# Patient Record
Sex: MALE | Race: WHITE | NOT HISPANIC OR LATINO | Employment: FULL TIME | URBAN - METROPOLITAN AREA
[De-identification: names, ages, dates, MRNs, and addresses within clinical notes are randomized per-mention and may not be internally consistent; named-entity substitution may affect disease eponyms.]

---

## 2021-05-17 ENCOUNTER — TRANSFERRED RECORDS (OUTPATIENT)
Dept: HEALTH INFORMATION MANAGEMENT | Facility: CLINIC | Age: 41
End: 2021-05-17

## 2021-06-14 ENCOUNTER — TRANSFERRED RECORDS (OUTPATIENT)
Dept: HEALTH INFORMATION MANAGEMENT | Facility: CLINIC | Age: 41
End: 2021-06-14

## 2021-07-26 ENCOUNTER — TRANSFERRED RECORDS (OUTPATIENT)
Dept: HEALTH INFORMATION MANAGEMENT | Facility: CLINIC | Age: 41
End: 2021-07-26

## 2021-07-30 ENCOUNTER — TRANSFERRED RECORDS (OUTPATIENT)
Dept: HEALTH INFORMATION MANAGEMENT | Facility: CLINIC | Age: 41
End: 2021-07-30

## 2021-07-30 ENCOUNTER — MEDICAL CORRESPONDENCE (OUTPATIENT)
Dept: HEALTH INFORMATION MANAGEMENT | Facility: CLINIC | Age: 41
End: 2021-07-30

## 2021-08-02 ENCOUNTER — MEDICAL CORRESPONDENCE (OUTPATIENT)
Dept: HEALTH INFORMATION MANAGEMENT | Facility: CLINIC | Age: 41
End: 2021-08-02

## 2021-08-03 ENCOUNTER — TRANSCRIBE ORDERS (OUTPATIENT)
Dept: OTHER | Age: 41
End: 2021-08-03

## 2021-08-03 DIAGNOSIS — K70.31 ALCOHOLIC CIRRHOSIS OF LIVER WITH ASCITES (H): Primary | ICD-10-CM

## 2021-08-12 NOTE — TELEPHONE ENCOUNTER
RECORDS RECEIVED FROM: External   Appt Date: 08.18.2021   NOTES STATUS DETAILS   OFFICE NOTE from referring provider Received 08.03.2021 Christina Fabian MD     OFFICE NOTES from other specialists Care Everywhere 05.24.2021 Perez Salcido MD      02.17.2021 Ashley Silva, APRN, CNP     DISCHARGE SUMMARY from hospital Care Everywhere 05.06.2021, 10.24.2020 [facility]   MEDICATION LIST Care Everywhere    LIVER BIOSPY (IF APPLICABLE)      PATHOLOGY REPORTS  N/A    IMAGING     ENDOSCOPY (IF AVAILABLE) N/A    COLONOSCOPY (IF AVAILABLE) N/A    ULTRASOUND LIVER Care Everywhere 08.09.2021, 08.03.2021, 10.5.2020 Ultrasound-guided paracentesis     CT OF ABDOMEN N/A    MRI OF LIVER N/A    FIBROSCAN, US ELASTOGRAPHY, FIBROSIS SCAN, MR ELASTOGRAPHY N/A    LABS     HEPATIC PANEL (LIVER PANEL) Care Everywhere 05.06.2021   BASIC METABOLIC PANEL Care Everywhere 05.24.2021   COMPLETE METABOLIC PANEL Care Everywhere 07.27.2021   COMPLETE BLOOD COUNT (CBC) Care Everywhere 07.27.2021   INTERNATIONAL NORMALIZED RATIO (INR) Care Everywhere 07.27.2021   HEPATITIS C ANTIBODY Care Everywhere 10.24.2020   HEPATITIS C VIRAL LOAD/PCR N/A    HEPATITIS C GENOTYPE N/A    HEPATITIS B SURFACE ANTIGEN Care Everywhere 10.24.2020   HEPATITIS B SURFACE ANTIBODY Care Everywhere 07.27.2021   HEPATITIS B DNA QUANT LEVEL N/A    HEPATITIS B CORE ANTIBODY Care Everywhere 07.27.2021     Action 08.12.2021 RM   Action Taken Sent fax request for images to [facility], pending.     Action 08.16.2021 RM   Action Taken Images received and uploaded to chart.

## 2021-08-18 ENCOUNTER — LAB (OUTPATIENT)
Dept: LAB | Facility: CLINIC | Age: 41
End: 2021-08-18
Attending: STUDENT IN AN ORGANIZED HEALTH CARE EDUCATION/TRAINING PROGRAM
Payer: COMMERCIAL

## 2021-08-18 ENCOUNTER — PRE VISIT (OUTPATIENT)
Dept: GASTROENTEROLOGY | Facility: CLINIC | Age: 41
End: 2021-08-18

## 2021-08-18 ENCOUNTER — TELEPHONE (OUTPATIENT)
Dept: GASTROENTEROLOGY | Facility: CLINIC | Age: 41
End: 2021-08-18

## 2021-08-18 ENCOUNTER — OFFICE VISIT (OUTPATIENT)
Dept: GASTROENTEROLOGY | Facility: CLINIC | Age: 41
End: 2021-08-18
Attending: INTERNAL MEDICINE
Payer: COMMERCIAL

## 2021-08-18 VITALS
SYSTOLIC BLOOD PRESSURE: 123 MMHG | WEIGHT: 248.6 LBS | DIASTOLIC BLOOD PRESSURE: 72 MMHG | OXYGEN SATURATION: 100 % | HEART RATE: 109 BPM

## 2021-08-18 DIAGNOSIS — F10.21 ALCOHOL USE DISORDER, SEVERE, IN EARLY REMISSION (H): ICD-10-CM

## 2021-08-18 DIAGNOSIS — K70.31 ALCOHOLIC CIRRHOSIS OF LIVER WITH ASCITES (H): Primary | ICD-10-CM

## 2021-08-18 DIAGNOSIS — E87.1 HYPONATREMIA: ICD-10-CM

## 2021-08-18 DIAGNOSIS — K70.31 ALCOHOLIC CIRRHOSIS OF LIVER WITH ASCITES (H): ICD-10-CM

## 2021-08-18 PROBLEM — J45.20 MILD INTERMITTENT ASTHMA WITHOUT COMPLICATION: Status: ACTIVE | Noted: 2018-01-08

## 2021-08-18 PROBLEM — E11.9 TYPE 2 DIABETES MELLITUS WITHOUT COMPLICATION, WITHOUT LONG-TERM CURRENT USE OF INSULIN (H): Status: ACTIVE | Noted: 2018-01-08

## 2021-08-18 PROBLEM — E66.9 OBESITY (BMI 30-39.9): Status: ACTIVE | Noted: 2018-01-08

## 2021-08-18 PROBLEM — N52.2 DRUG-INDUCED ERECTILE DYSFUNCTION: Status: ACTIVE | Noted: 2018-01-08

## 2021-08-18 LAB
AFP SERPL-MCNC: 3.2 UG/L (ref 0–8)
ALBUMIN SERPL-MCNC: 3.2 G/DL (ref 3.4–5)
ALP SERPL-CCNC: 101 U/L (ref 40–150)
ALT SERPL W P-5'-P-CCNC: 54 U/L (ref 0–70)
ANION GAP SERPL CALCULATED.3IONS-SCNC: 9 MMOL/L (ref 3–14)
AST SERPL W P-5'-P-CCNC: 50 U/L (ref 0–45)
BILIRUB SERPL-MCNC: 1.2 MG/DL (ref 0.2–1.3)
BUN SERPL-MCNC: 37 MG/DL (ref 7–30)
CALCIUM SERPL-MCNC: 9.3 MG/DL (ref 8.5–10.1)
CHLORIDE BLD-SCNC: 95 MMOL/L (ref 94–109)
CO2 SERPL-SCNC: 21 MMOL/L (ref 20–32)
CREAT SERPL-MCNC: 1.17 MG/DL (ref 0.66–1.25)
ERYTHROCYTE [DISTWIDTH] IN BLOOD BY AUTOMATED COUNT: 14.2 % (ref 10–15)
FERRITIN SERPL-MCNC: 64 NG/ML (ref 26–388)
GFR SERPL CREATININE-BSD FRML MDRD: 76 ML/MIN/1.73M2
GLUCOSE BLD-MCNC: 129 MG/DL (ref 70–99)
HCT VFR BLD AUTO: 29 % (ref 40–53)
HGB BLD-MCNC: 9.7 G/DL (ref 13.3–17.7)
INR PPP: 1.21 (ref 0.85–1.15)
MCH RBC QN AUTO: 29.8 PG (ref 26.5–33)
MCHC RBC AUTO-ENTMCNC: 33.4 G/DL (ref 31.5–36.5)
MCV RBC AUTO: 89 FL (ref 78–100)
PLATELET # BLD AUTO: 192 10E3/UL (ref 150–450)
POTASSIUM BLD-SCNC: 5.3 MMOL/L (ref 3.4–5.3)
PROT SERPL-MCNC: 6.5 G/DL (ref 6.8–8.8)
RBC # BLD AUTO: 3.26 10E6/UL (ref 4.4–5.9)
SODIUM SERPL-SCNC: 125 MMOL/L (ref 133–144)
WBC # BLD AUTO: 8.5 10E3/UL (ref 4–11)

## 2021-08-18 PROCEDURE — 80053 COMPREHEN METABOLIC PANEL: CPT | Performed by: PATHOLOGY

## 2021-08-18 PROCEDURE — 85027 COMPLETE CBC AUTOMATED: CPT | Performed by: PATHOLOGY

## 2021-08-18 PROCEDURE — 36415 COLL VENOUS BLD VENIPUNCTURE: CPT | Performed by: PATHOLOGY

## 2021-08-18 PROCEDURE — 85610 PROTHROMBIN TIME: CPT | Performed by: PATHOLOGY

## 2021-08-18 PROCEDURE — 80321 ALCOHOLS BIOMARKERS 1OR 2: CPT | Mod: 90 | Performed by: PATHOLOGY

## 2021-08-18 PROCEDURE — 82105 ALPHA-FETOPROTEIN SERUM: CPT | Mod: 90 | Performed by: PATHOLOGY

## 2021-08-18 PROCEDURE — 82728 ASSAY OF FERRITIN: CPT | Performed by: PATHOLOGY

## 2021-08-18 PROCEDURE — 99417 PROLNG OP E/M EACH 15 MIN: CPT | Performed by: STUDENT IN AN ORGANIZED HEALTH CARE EDUCATION/TRAINING PROGRAM

## 2021-08-18 PROCEDURE — 99205 OFFICE O/P NEW HI 60 MIN: CPT | Performed by: STUDENT IN AN ORGANIZED HEALTH CARE EDUCATION/TRAINING PROGRAM

## 2021-08-18 RX ORDER — FUROSEMIDE 40 MG
40 TABLET ORAL DAILY
COMMUNITY
Start: 2020-10-27 | End: 2021-10-18

## 2021-08-18 RX ORDER — FERROUS SULFATE 325(65) MG
325 TABLET ORAL
COMMUNITY
End: 2023-08-30

## 2021-08-18 RX ORDER — MULTIVITAMIN WITH IRON
1 TABLET ORAL DAILY
COMMUNITY
End: 2022-09-13

## 2021-08-18 RX ORDER — CHLORAL HYDRATE 500 MG
2 CAPSULE ORAL DAILY
COMMUNITY
End: 2022-09-13

## 2021-08-18 NOTE — LETTER
8/18/2021         RE: Florian Nowak  [address] [address]        Dear Colleague,    Thank you for referring your patient, Florian oNwak, to the [facility] [place]. Please see a copy of my visit note below.    [facility] New Patient Visit    Date of Visit: August 18, 2021    Reason for referral: Alcohol related liver disease    Subjective: Mr. Nowak is a 41 year old man with a history of AUD, alcohol related cirrhosis, who presents for evaluation of refractory ascites.     He was told he had KANG in his 30s while living in [place]. Was around 300 lbs then. At that time was drinking 5 drinks 5 days a week. Ferritin was elevated, did not get HC testing.     He reports he would drink 5 drinks 5 days a week roughly. He would have some periods of sobriety for a few months when trying to quit. Spring 2020 cut back to 2 glasses of wine a day. Was not feeling well, stopping drinking in October and then suddenly got bloated. Presented to urgent care 10/2020 found to have new ascites. BR was 5 at that time.     First few months could go 2-4 weeks between paracentesis, had to increase frequency. In May K noted to be high with DELLA (creatinine 3.4) - was admitted for this. Was taking hydrochlorothiazide, lisinopril and BB at that time. After this was getting twice a week paracentesis, sometimes up to 10 L. He does not eat processed foods, has cut back on his salt and now getting rob every 5-6 days, 6-10 liters with albumin replacement. Sent for cell count every time, no history of SBP. Not restricting fluid specifically because gets light headed with this, does not think he is drinking too much. Taking lasix 80 mg daily, dose increased a few weeks ago.     He has never done alcohol counseling. No history of DUIs or trouble at work but there was an instance where someone thought he smelled like alcohol, had to get tested and that was  negative. He is committed to never drinking again, does not have cravings to drink.     Painful leg tremors/shaking/cramping at night - causing sleep deprivation. Started magnesium and iron for this. Having issues with insomnia.     No history of HE, variceal bleeding. Had an EGD 6/2021 that showed grade 2 EVs with red whales, banded. Also severe PHG.     Had negative hepatitis B and C testing in the past. Getting vaccinated against hepatitis A/B.     ROS: 14 point ROS negative except for positives noted in HPI.    PMHx:  - AUD  - Alcohol related cirrhosis c/b refractory ascites  - Asthma  - HTN    PSHx:  - Back surgery microdisectomy    FamHx:  No family history of liver disease, liver cancer    SocHx:  Social History     Socioeconomic History     Marital status:      Spouse name: Not on file     Number of children: Not on file     Years of education: Not on file     Highest education level: Not on file   Occupational History     Not on file   Tobacco Use     Smoking status: Former Smoker     Smokeless tobacco: Never Used     Tobacco comment: College   Substance and Sexual Activity     Alcohol use: Not Currently     Drug use: Not Currently     Sexual activity: Not on file   Other Topics Concern     Not on file   Social History Narrative     Not on file     Social Determinants of Health     Financial Resource Strain:      Difficulty of Paying Living Expenses:    Food Insecurity:      Worried About Running Out of Food in the Last Year:      Ran Out of Food in the Last Year:    Transportation Needs:      Lack of Transportation (Medical):      Lack of Transportation (Non-Medical):    Physical Activity:      Days of Exercise per Week:      Minutes of Exercise per Session:    Stress:      Feeling of Stress :    Social Connections:      Frequency of Communication with Friends and Family:      Frequency of Social Gatherings with Friends and Family:      Attends Judaism Services:      Active Member of Clubs or  Organizations:      Attends Club or Organization Meetings:      Marital Status:    Intimate Partner Violence:      Fear of Current or Ex-Partner:      Emotionally Abused:      Physically Abused:      Sexually Abused:    Lives with 5 year old daughter  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   No NSAIDs    Allergies:  Allergies   Allergen Reactions     Ketorolac Tromethamine Other (See Comments)       Objective:  /72   Pulse 109   Wt 112.8 kg (248 lb 9.6 oz)   SpO2 100%   Constitutional: pleasant man in NAD  Eyes: non icteric  Respiratory: Normal respiratory excursion   MSK: normal range of motion of visualized extremities  Abd: Non distended  Skin: No jaundice  Psychiatric: normal mood and orientation    Labs:  Last Comprehensive Metabolic Panel:  Sodium   Date Value Ref Range Status   08/18/2021 125 (L) 133 - 144 mmol/L Final     Potassium   Date Value Ref Range Status   08/18/2021 5.3 3.4 - 5.3 mmol/L Final     Chloride   Date Value Ref Range Status   08/18/2021 95 94 - 109 mmol/L Final     Carbon Dioxide (CO2)   Date Value Ref Range Status   08/18/2021 21 20 - 32 mmol/L Final     Anion Gap   Date Value Ref Range Status   08/18/2021 9 3 - 14 mmol/L Final     Glucose   Date Value Ref Range Status   08/18/2021 129 (H) 70 - 99 mg/dL Final     Urea Nitrogen   Date Value Ref Range Status   08/18/2021 37 (H) 7 - 30 mg/dL Final     Creatinine   Date Value Ref Range Status   08/18/2021 1.17 0.66 - 1.25 mg/dL Final     GFR Estimate   Date Value Ref Range Status   08/18/2021 76 >60 mL/min/1.73m2 Final     Comment:     As of July 11, 2021,  eGFR is calculated by the CKD-EPI creatinine equation, without race adjustment. eGFR can be influenced by muscle mass, exercise, and diet. The reported eGFR is an estimation only and is only applicable if the renal function is stable.     Calcium   Date Value Ref Range Status   08/18/2021 9.3 8.5 - 10.1 mg/dL Final     Bilirubin Total   Date Value Ref Range Status   08/18/2021 1.2 0.2 - 1.3 mg/dL Final     Alkaline Phosphatase   Date Value Ref Range Status   08/18/2021 101 40 - 150 U/L Final     ALT   Date Value Ref Range Status   08/18/2021 54 0 - 70 U/L Final     AST   Date Value Ref Range Status   08/18/2021 50 (H) 0 - 45 U/L Final       Lab Results   Component Value Date    WBC 8.5 08/18/2021     Lab Results   Component Value Date    RBC 3.26 08/18/2021     Lab Results   Component Value Date    HGB 9.7 08/18/2021     Lab Results   Component Value Date    HCT 29.0 08/18/2021     Lab Results   Component Value Date    MCV 89 08/18/2021     Lab Results   Component Value Date    MCH 29.8 08/18/2021     Lab Results   Component Value Date    MCHC 33.4 08/18/2021     Lab Results   Component Value Date    RDW 14.2 08/18/2021     Lab Results   Component Value Date     08/18/2021       INR   Date Value Ref Range Status   08/18/2021 1.21 (H) 0.85 - 1.15 Final     Comment:     Effective 7/11/2021, the reference range for this assay has changed.        MELD-Na score: 11 at 8/18/2021  9:18 AM  MELD score: 11 at 8/18/2021  9:18 AM  Calculated from:  Serum Creatinine: 1.17 mg/dL at 8/18/2021  9:18 AM  Serum Sodium: 125 mmol/L at 8/18/2021  9:18 AM  Total Bilirubin: 1.2 mg/dL at 8/18/2021  9:18 AM  INR(ratio): 1.21 at 8/18/2021  9:17 AM  Age: 41 years     8/12  Na 122  K 5.5 Creatinine 1.23  INR 1.3  BR 1.3    Creatinine 1-1.2    MELD Na 23  MELD 12    Imaging:    RUQ US 5/17/2021    Aorta and IVC: See comments..     Pancreas: Partially obscured but visualized portions are unremarkable.     Liver: Nodular contour.   Increased echogenicity.     Liver Elastography: Could not be performed due to ascites     CBD: Not visualized     Gallbladder: Nondistended. Mild gallbladder wall thickening to 0.4 cm likely due to chronic liver disease. 3 mm polyp or mural adherent sludge ball.     Sonographic [place]'s Sign: No.     Right Kidney: Measures 12.3 x 5.1 x 5.9 cm. Appears unremarkable.     Left Kidney: Measures  12.3 x 5.6 x 4.7 cm. Appears unremarkable.     Spleen: 14 cm in length.     Ascites: Yes.     IMPRESSION:       1. Cirrhotic-appearing liver with ascites and mild splenomegaly. No visualized focal liver abnormality.     2. Liver elastography could not be performed due to ascites.     3. 3 mm gallbladder polyp or mural adherent sludge ball, this requires no specific follow-up due to its small size.    Endoscopy:    EGD 6/14 Grade 2 EVS with red whale s/p banding x3, severe PHG    Independently reviewed labs and imaging.      Assessment/Plan: Mr. Nowak is a 41 year old man with a history of AUD, alcohol related cirrhosis, who presents for evaluation of refractory ascites.     His liver disease has been complicated by ascites, intolerant to diuretics, requiring serial paracentesis. He does not have a history of HE, SBP, Variceal bleeding.     Discussed the natural history of alcohol related liver disease, he continues to have issues with ascites > 6 months sober. Recommend referral for TIPS pending cardiac evaluation. Also discussed the role of liver transplant, think he would be a good TIPS candidate, discussed the general criteria for that which would include alcohol assessment and counseling.     Labs stable today with baseline hyponatremia.     - Check  ferritin given history. He may need HC testing  - Continue to completely abstain from alcohol including NA beers. Discussed regular Peth testing  - CD referral - discussed this important for sobriety and liver transplant if he were to worsen in the future  - Ordered TTE and  will refer for TIPS  - Continue paracentesis weekly PRN 6 L max with 12.5 grams/L albumin replacement  - Continue current dose of diuretics, would not increase further  - Continue low sodium diet and recommend 2 L fluid restriction with his sodium  - Discussed abx for primary ppx of SBP - he has not tolerated cipro in the past, and would not recommend bactrim given his hyponatremia and hyperkalemia. Will hold on this time, he is getting his cell count done with every paracentesis  Orders Placed This Encounter   Procedures     Comprehensive metabolic panel (BMP + Alb, Alk Phos, ALT, AST, Total. Bili, TP)     CBC with platelets     INR     AFP tumor marker     Phosphatidylethanol (PEth)     Ferritin     IR Referral     MENTAL HEALTH REFERRAL  - Adult; Assessments and Testing; MH/CD Assessment Center - Assess & Treat; Chemical Health Evaluation - determine appropriate level of care and admit to program; [facility]: [facility] [phone]; We will contact you to ...     Echocardiogram Complete     RTC 6 weeks    Cadence Murray MD MS  Hepatology/Liver Transplant  [facility]    Approximately 65 minutes was spent for the visit with 40 minutes of non face-to-face time were spent in review of the patient's medical record on the day of the visit. This included review of previous: clinic visits, hospital records, lab results, imaging studies, and documentation.  The findings from this review are summarized in the above note.              Again, thank you for allowing me to participate in the care of your patient.        Sincerely,        Cadence Murray MD

## 2021-08-18 NOTE — PROGRESS NOTES
[facility] New Patient Visit    Date of Visit: August 18, 2021    Reason for referral: Alcohol related liver disease    Subjective: Mr. Nowak is a 41 year old man with a history of AUD, alcohol related cirrhosis, who presents for evaluation of refractory ascites.     He was told he had KANG in his 30s while living in [place]. Was around 300 lbs then. At that time was drinking 5 drinks 5 days a week. Ferritin was elevated, did not get HC testing.     He reports he would drink 5 drinks 5 days a week roughly. He would have some periods of sobriety for a few months when trying to quit. Spring 2020 cut back to 2 glasses of wine a day. Was not feeling well, stopping drinking in October and then suddenly got bloated. Presented to urgent care 10/2020 found to have new ascites. BR was 5 at that time.     First few months could go 2-4 weeks between paracentesis, had to increase frequency. In May K noted to be high with DELLA (creatinine 3.4) - was admitted for this. Was taking hydrochlorothiazide, lisinopril and BB at that time. After this was getting twice a week paracentesis, sometimes up to 10 L. He does not eat processed foods, has cut back on his salt and now getting rob every 5-6 days, 6-10 liters with albumin replacement. Sent for cell count every time, no history of SBP. Not restricting fluid specifically because gets light headed with this, does not think he is drinking too much. Taking lasix 80 mg daily, dose increased a few weeks ago.     He has never done alcohol counseling. No history of DUIs or trouble at work but there was an instance where someone thought he smelled like alcohol, had to get tested and that was negative. He is committed to never drinking again, does not have cravings to drink.     Painful leg tremors/shaking/cramping at night - causing sleep deprivation. Started magnesium and iron for this. Having issues with insomnia.     No history of HE, variceal bleeding. Had an  EGD 6/2021 that showed grade 2 EVs with red whales, banded. Also severe PHG.     Had negative hepatitis B and C testing in the past. Getting vaccinated against hepatitis A/B.     ROS: 14 point ROS negative except for positives noted in HPI.    PMHx:  - AUD  - Alcohol related cirrhosis c/b refractory ascites  - Asthma  - HTN    PSHx:  - Back surgery microdisectomy    FamHx:  No family history of liver disease, liver cancer    SocHx:  Social History     Socioeconomic History     Marital status:      Spouse name: Not on file     Number of children: Not on file     Years of education: Not on file     Highest education level: Not on file   Occupational History     Not on file   Tobacco Use     Smoking status: Former Smoker     Smokeless tobacco: Never Used     Tobacco comment: College   Substance and Sexual Activity     Alcohol use: Not Currently     Drug use: Not Currently     Sexual activity: Not on file   Other Topics Concern     Not on file   Social History Narrative     Not on file     Social Determinants of Health     Financial Resource Strain:      Difficulty of Paying Living Expenses:    Food Insecurity:      Worried About Running Out of Food in the Last Year:      Ran Out of Food in the Last Year:    Transportation Needs:      Lack of Transportation (Medical):      Lack of Transportation (Non-Medical):    Physical Activity:      Days of Exercise per Week:      Minutes of Exercise per Session:    Stress:      Feeling of Stress :    Social Connections:      Frequency of Communication with Friends and Family:      Frequency of Social Gatherings with Friends and Family:      Attends Temple Services:      Active Member of Clubs or Organizations:      Attends Club or Organization Meetings:      Marital Status:    Intimate Partner Violence:      Fear of Current or Ex-Partner:      Emotionally Abused:      Physically Abused:      Sexually Abused:    Lives with 5 year old daughter  Works with patients with  schizophrenia has a masters in psychology    Medications:  Current Outpatient Medications   Medication     ferrous sulfate (FEROSUL) 325 (65 Fe) MG tablet     fish oil-omega-3 fatty acids 1000 MG capsule     furosemide (LASIX) 40 MG tablet     magnesium 250 MG tablet     vitamin D3 (CHOLECALCIFEROL) 250 mcg (67292 units) capsule     No current facility-administered medications for this visit.   No NSAIDs    Allergies:  Allergies   Allergen Reactions     Ketorolac Tromethamine Other (See Comments)       Objective:  /72   Pulse 109   Wt 112.8 kg (248 lb 9.6 oz)   SpO2 100%   Constitutional: pleasant man in NAD  Eyes: non icteric  Respiratory: Normal respiratory excursion   MSK: normal range of motion of visualized extremities  Abd: Non distended  Skin: No jaundice  Psychiatric: normal mood and orientation    Labs:  Last Comprehensive Metabolic Panel:  Sodium   Date Value Ref Range Status   08/18/2021 125 (L) 133 - 144 mmol/L Final     Potassium   Date Value Ref Range Status   08/18/2021 5.3 3.4 - 5.3 mmol/L Final     Chloride   Date Value Ref Range Status   08/18/2021 95 94 - 109 mmol/L Final     Carbon Dioxide (CO2)   Date Value Ref Range Status   08/18/2021 21 20 - 32 mmol/L Final     Anion Gap   Date Value Ref Range Status   08/18/2021 9 3 - 14 mmol/L Final     Glucose   Date Value Ref Range Status   08/18/2021 129 (H) 70 - 99 mg/dL Final     Urea Nitrogen   Date Value Ref Range Status   08/18/2021 37 (H) 7 - 30 mg/dL Final     Creatinine   Date Value Ref Range Status   08/18/2021 1.17 0.66 - 1.25 mg/dL Final     GFR Estimate   Date Value Ref Range Status   08/18/2021 76 >60 mL/min/1.73m2 Final     Comment:     As of July 11, 2021, eGFR is calculated by the CKD-EPI creatinine equation, without race adjustment. eGFR can be influenced by muscle mass, exercise, and diet. The reported eGFR is an estimation only and is only applicable if the renal function is stable.     Calcium   Date Value Ref Range Status    08/18/2021 9.3 8.5 - 10.1 mg/dL Final     Bilirubin Total   Date Value Ref Range Status   08/18/2021 1.2 0.2 - 1.3 mg/dL Final     Alkaline Phosphatase   Date Value Ref Range Status   08/18/2021 101 40 - 150 U/L Final     ALT   Date Value Ref Range Status   08/18/2021 54 0 - 70 U/L Final     AST   Date Value Ref Range Status   08/18/2021 50 (H) 0 - 45 U/L Final       Lab Results   Component Value Date    WBC 8.5 08/18/2021     Lab Results   Component Value Date    RBC 3.26 08/18/2021     Lab Results   Component Value Date    HGB 9.7 08/18/2021     Lab Results   Component Value Date    HCT 29.0 08/18/2021     Lab Results   Component Value Date    MCV 89 08/18/2021     Lab Results   Component Value Date    MCH 29.8 08/18/2021     Lab Results   Component Value Date    MCHC 33.4 08/18/2021     Lab Results   Component Value Date    RDW 14.2 08/18/2021     Lab Results   Component Value Date     08/18/2021       INR   Date Value Ref Range Status   08/18/2021 1.21 (H) 0.85 - 1.15 Final     Comment:     Effective 7/11/2021, the reference range for this assay has changed.        MELD-Na score: 11 at 8/18/2021  9:18 AM  MELD score: 11 at 8/18/2021  9:18 AM  Calculated from:  Serum Creatinine: 1.17 mg/dL at 8/18/2021  9:18 AM  Serum Sodium: 125 mmol/L at 8/18/2021  9:18 AM  Total Bilirubin: 1.2 mg/dL at 8/18/2021  9:18 AM  INR(ratio): 1.21 at 8/18/2021  9:17 AM  Age: 41 years     8/12  Na 122  K 5.5 Creatinine 1.23  INR 1.3  BR 1.3    Creatinine 1-1.2    MELD Na 23  MELD 12    Imaging:    RUQ US 5/17/2021    Aorta and IVC: See comments..     Pancreas: Partially obscured but visualized portions are unremarkable.     Liver: Nodular contour.  Increased echogenicity.     Liver Elastography: Could not be performed due to ascites     CBD: Not visualized     Gallbladder: Nondistended. Mild gallbladder wall thickening to 0.4 cm likely due to chronic liver disease. 3 mm polyp or mural adherent sludge ball.     Sonographic  [place]'s Sign: No.     Right Kidney: Measures 12.3 x 5.1 x 5.9 cm. Appears unremarkable.     Left Kidney: Measures  12.3 x 5.6 x 4.7 cm. Appears unremarkable.     Spleen: 14 cm in length.     Ascites: Yes.     IMPRESSION:       1. Cirrhotic-appearing liver with ascites and mild splenomegaly. No visualized focal liver abnormality.     2. Liver elastography could not be performed due to ascites.     3. 3 mm gallbladder polyp or mural adherent sludge ball, this requires no specific follow-up due to its small size.    Endoscopy:    EGD 6/14 Grade 2 EVS with red whale s/p banding x3, severe PHG    Independently reviewed labs and imaging.      Assessment/Plan: Mr. Nowak is a 41 year old man with a history of AUD, alcohol related cirrhosis, who presents for evaluation of refractory ascites.     His liver disease has been complicated by ascites, intolerant to diuretics, requiring serial paracentesis. He does not have a history of HE, SBP, Variceal bleeding.     Discussed the natural history of alcohol related liver disease, he continues to have issues with ascites > 6 months sober. Recommend referral for TIPS pending cardiac evaluation. Also discussed the role of liver transplant, think he would be a good TIPS candidate, discussed the general criteria for that which would include alcohol assessment and counseling.     Labs stable today with baseline hyponatremia.     - Check  ferritin given history. He may need HC testing  - Continue to completely abstain from alcohol including NA beers. Discussed regular Peth testing  - CD referral - discussed this important for sobriety and liver transplant if he were to worsen in the future  - Ordered TTE and will refer for TIPS  - Continue paracentesis weekly PRN 6 L max with 12.5 grams/L albumin replacement  - Continue current dose of diuretics, would not increase further  - Continue low sodium diet and recommend 2 L fluid restriction with his sodium  - Discussed abx for primary ppx  of SBP - he has not tolerated cipro in the past, and would not recommend bactrim given his hyponatremia and hyperkalemia. Will hold on this time, he is getting his cell count done with every paracentesis  Orders Placed This Encounter   Procedures     Comprehensive metabolic panel (BMP + Alb, Alk Phos, ALT, AST, Total. Bili, TP)     CBC with platelets     INR     AFP tumor marker     Phosphatidylethanol (PEth)     Ferritin     IR Referral     MENTAL HEALTH REFERRAL  - Adult; Assessments and Testing; MH/CD Assessment Center - Assess & Treat; Chemical Health Evaluation - determine appropriate level of care and admit to program; [facility]: [facility] [phone]; We will contact you to ...     Echocardiogram Complete     RTC 6 weeks    Cadence Murray MD MS  Hepatology/Liver Transplant  [facility]    Approximately 65 minutes was spent for the visit with 40 minutes of non face-to-face time were spent in review of the patient's medical record on the day of the visit. This included review of previous: clinic visits, hospital records, lab results, imaging studies, and documentation.  The findings from this review are summarized in the above note.

## 2021-08-18 NOTE — TELEPHONE ENCOUNTER
----- Message from Cadence Murray MD sent at 8/18/2021  1:48 PM CDT -----  Hey - can you send an order for weekly rob 6 L max with 25 grams albumin replacement for < 3 L removed, and 50 grams for < 6 L removed [facility]? They should send for cell count/dif. HE has an order there but want to update the limit    Thanks!

## 2021-08-18 NOTE — NURSING NOTE
Chief Complaint   Patient presents with     Consult     New pt consult       Blood pressure 123/72, pulse 109, weight 112.8 kg (248 lb 9.6 oz), SpO2 100 %.    Desiree Laboy on 8/18/2021 at 8:04 AM

## 2021-08-19 ENCOUNTER — TRANSFERRED RECORDS (OUTPATIENT)
Dept: HEALTH INFORMATION MANAGEMENT | Facility: CLINIC | Age: 41
End: 2021-08-19

## 2021-08-19 ENCOUNTER — TELEPHONE (OUTPATIENT)
Dept: GASTROENTEROLOGY | Facility: CLINIC | Age: 41
End: 2021-08-19

## 2021-08-19 NOTE — TELEPHONE ENCOUNTER
New para order faxed to [facility] as requested by Dr. Murray. Asked that [facility] schedule reach out to pt to schedule para.    Danielle SOLIS LPN  [facility]atology Clinic

## 2021-08-19 NOTE — TELEPHONE ENCOUNTER
Call back to Gerda Strauss,NP was not able to speak with Gerda because was in doing a procedure. Spoke with MICHELE Sequeira they just wanted to clarify that they are to use Dr. Murray's albumin order instead of the old order. Clarified they should go by Dr. Murray's order going forward.    Danielle SOLIS LPN  [facility]ology Clinic

## 2021-08-19 NOTE — TELEPHONE ENCOUNTER
[facility] Call Center    Phone Message    May a detailed message be left on voicemail: yes     Reason for Call: Other: Please call Gerdarosa Strauss [phone] to clarifyUS Paracentesis  Orders.     Action Taken: Message routed to:  [facility]    Travel Screening: Not Applicable

## 2021-08-23 ENCOUNTER — TRANSFERRED RECORDS (OUTPATIENT)
Dept: HEALTH INFORMATION MANAGEMENT | Facility: CLINIC | Age: 41
End: 2021-08-23

## 2021-08-23 LAB — PETH BLD-MCNC: NEGATIVE NG/ML

## 2021-08-24 ENCOUNTER — TELEPHONE (OUTPATIENT)
Dept: RADIOLOGY | Facility: CLINIC | Age: 41
End: 2021-08-24

## 2021-08-24 NOTE — TELEPHONE ENCOUNTER
----- Message from Alee Tomas RN sent at 8/18/2021  1:59 PM CDT -----  Regarding: NEW tips consult , Dr Murray referring  Please reach out and schedule pt for CT abd pelv first and then in person visit with Dr Parry,     Due anytime now,    Thanks,    TERRY Tomas, RN, BSN  Interventional Radiology Nurse Coordinator   Phone:  [phone]

## 2021-09-01 ENCOUNTER — TELEPHONE (OUTPATIENT)
Dept: GASTROENTEROLOGY | Facility: CLINIC | Age: 41
End: 2021-09-01

## 2021-09-01 ENCOUNTER — REFERRAL (OUTPATIENT)
Dept: TRANSPLANT | Facility: CLINIC | Age: 41
End: 2021-09-01

## 2021-09-01 DIAGNOSIS — K70.31 ALCOHOLIC CIRRHOSIS OF LIVER WITH ASCITES (H): Primary | ICD-10-CM

## 2021-09-01 DIAGNOSIS — K70.31 ALCOHOLIC CIRRHOSIS OF LIVER WITH ASCITES (H): ICD-10-CM

## 2021-09-01 DIAGNOSIS — K70.30 ALCOHOLIC CIRRHOSIS (H): Primary | ICD-10-CM

## 2021-09-01 NOTE — LETTER
October 18, 2021    Florian ROMAN Eliu  [address] [address]    Dear Mr. Nowak,   The purpose of this letter is to let you know that based on the results of your consultation and the selection criteria used by our program , the decision was made to not evaluation you for the liver transplant list.  This is because your current MELD score of 11 is too well for liver transplant.  Important things you should know:    If you would like to discuss the decision, or if your medical status changes you may schedule a return visits with your doctor by calling [phone] and asking to speak to your transplant coordinator.    We recommend that you continue to follow up with your primary care doctor in order to manage your health concerns.    Continue to follow with Dr. Murray as if your liver does take a turn she will get you back into transplant evaluation.   Enclosed is a letter from [facility] which describes the services offered to patients by [facility] and the Organ Procurement and Transplantation Network.  Thank you for allowing us to participate in your care.  We wish you well.  Sincerely,    Douglas Manzano Jr., BSN, RN  Liver Transplant Coordinator  [phone]    Enclosures: [facility] Letter  cc: Care Team            The Organ Procurement and Transplantation Network  Toll-free patient services line:     Your resource for organ transplant information    If you have a question regarding your own medical care, you always should call your transplant hospital first. However, for general organ transplant-related information, you can call the Organ Procurement and Transplantation Network (OPTN) toll-free patient services line at [phone]- 1991. Anyone, including potential transplant candidates, candidates, recipients, family members, friends, living donors, and donor family members, can call this number to:          Talk about organ donation, living donation, the transplant process, the donation process, and  transplant policies.    Get a free patient information kit with helpful booklets, waiting list and transplant information, and a list of all transplant hospitals.    Ask questions about the OPTN website (https://optn.transplant.hrsa.gov/), the United Network for Organ Sharing s (UNOS) website (https://unos.org/), or the UNOS website for living donors and transplant recipients. (https://www.transplantliving.org/).    Learn how the OPTN can help you.    Talk about any concerns that you may have with a transplant hospital.    The [facility] transplant system, the OPTN, is managed under federal contract by the United Network for Organ Sharing (UNOS), which is a non-profit charitable organization. The OPTN helps create and define organ sharing policies that make the best use of donated organs. This process continuously evaluating new advances and discoveries so policies can be adapted to best serve patients waiting for transplants. To do so, the OPTN works closely with transplant professionals, transplant patients, transplant candidates, donor families, living donors, and the public. All transplant programs and organ procurement organizations throughout the country are OPTN members and are obligated to follow the policies the OPTN creates for allocating organs.    The OPTN also is responsible for:      Providing educational material for patients, the public, and professionals.    Raising awareness of the need for donated organs and tissue.    Coordinating organ procurement, matching, and placement.    Collecting information about every organ transplant and donation that occurs in the [place].    Remember, you should contact your transplant hospital directly if you have questions or concerns about your own medical care including medical records, work-up progress, and test results.    We are not your transplant hospital, and our staff will not be able to answer questions about your case, so please keep your transplant  hospital s phone number handy.    However, while you research your transplant needs and learn as much as you can about transplantation and donation, we welcome your call to our toll-free patient services line at [phone].          Updated 4/1/2019

## 2021-09-01 NOTE — LETTER
Florian Nowak  [address] [address]          Dear Florian,    Thank you for your interest in the Transplant Center at [facility]. We look forward to being a part of your care team and assisting you through the transplant process.    As we discussed, your transplant coordinator is Douglas Manzano Jr. (Liver).  You may call your coordinator at any time with questions or concerns call [phone].    Please complete the following.    1. Fill out and return the enclosed forms    Authorization for Electronic Communication    Authorization to Discuss Protected Health Information    Authorization for Release of Protected Health Information    Authorization for Care Everywhere Release of Information    2. Sign up for:    [employer], access to your electronic medical record (see enclosed pamphlet)    [employer], a transplant education website    You can use these tools to learn more about your transplant, communicate with your care team, and track your medical details      Sincerely,  [facility] Transplant  [facility]    cc: Referring Physician and PCP

## 2021-09-01 NOTE — TELEPHONE ENCOUNTER
----- Message from Cadence Murray MD sent at 8/31/2021  9:24 PM CDT -----  Regarding: Para order  Hey -    Can you place an order for weekly rob for this patient to be sent to [facility]? He got a para today, so hoping to schedule the first next week (do you call to help that or just give him the number to call for [facility]?)    Para order: Continue paracentesis weekly PRN 6 L max with 12.5 grams/L albumin replacement. Send for cell count/dif each para    Thanks!

## 2021-09-01 NOTE — TELEPHONE ENCOUNTER
Updated para order entered.  Pt to continue para weekly PRN, 6 L max with 12.5 grams/L albumin replacement. Send for cell count/diff with each para.    Updated pt via [employer] message. Number given for patient to schedule own appt at [facility].    Danielle SOLIS LPN  Hepatology Clinic

## 2021-09-02 ENCOUNTER — HOSPITAL ENCOUNTER (OUTPATIENT)
Dept: ULTRASOUND IMAGING | Facility: CLINIC | Age: 41
End: 2021-09-02
Attending: STUDENT IN AN ORGANIZED HEALTH CARE EDUCATION/TRAINING PROGRAM
Payer: COMMERCIAL

## 2021-09-02 ENCOUNTER — HOSPITAL ENCOUNTER (OUTPATIENT)
Facility: CLINIC | Age: 41
Discharge: HOME | End: 2021-09-02
Admitting: RADIOLOGY
Payer: COMMERCIAL

## 2021-09-02 VITALS
TEMPERATURE: 99.3 F | OXYGEN SATURATION: 100 % | SYSTOLIC BLOOD PRESSURE: 115 MMHG | RESPIRATION RATE: 16 BRPM | HEART RATE: 92 BPM | DIASTOLIC BLOOD PRESSURE: 60 MMHG

## 2021-09-02 DIAGNOSIS — K70.31 ALCOHOLIC CIRRHOSIS OF LIVER WITH ASCITES (H): ICD-10-CM

## 2021-09-02 LAB
APPEARANCE FLD: ABNORMAL
COLOR FLD: ABNORMAL
EOSINOPHIL NFR FLD MANUAL: 1 %
LYMPHOCYTES NFR FLD MANUAL: 20 %
MONOS+MACROS NFR FLD MANUAL: 59 %
NEUTS BAND NFR FLD MANUAL: 14 %
OTHER CELLS FLD MANUAL: 6 %
SARS-COV-2 RNA RESP QL NAA+PROBE: NEGATIVE
WBC # FLD AUTO: 327 /UL

## 2021-09-02 PROCEDURE — 999N000154 HC STATISTIC RADIOLOGY XRAY, US, CT, MAR, NM

## 2021-09-02 PROCEDURE — 250N000011 HC RX IP 250 OP 636

## 2021-09-02 PROCEDURE — 89050 BODY FLUID CELL COUNT: CPT | Performed by: STUDENT IN AN ORGANIZED HEALTH CARE EDUCATION/TRAINING PROGRAM

## 2021-09-02 PROCEDURE — 272N000706 US PARACENTESIS

## 2021-09-02 PROCEDURE — P9047 ALBUMIN (HUMAN), 25%, 50ML: HCPCS

## 2021-09-02 PROCEDURE — 89051 BODY FLUID CELL COUNT: CPT | Performed by: STUDENT IN AN ORGANIZED HEALTH CARE EDUCATION/TRAINING PROGRAM

## 2021-09-02 PROCEDURE — 87635 SARS-COV-2 COVID-19 AMP PRB: CPT | Performed by: PHYSICIAN ASSISTANT

## 2021-09-02 RX ORDER — LIDOCAINE HYDROCHLORIDE 10 MG/ML
10 INJECTION, SOLUTION EPIDURAL; INFILTRATION; INTRACAUDAL; PERINEURAL ONCE
Status: COMPLETED | OUTPATIENT
Start: 2021-09-02 | End: 2021-09-02

## 2021-09-02 RX ORDER — AMOXICILLIN 500 MG/1
500 TABLET, FILM COATED ORAL 2 TIMES DAILY
COMMUNITY
End: 2021-10-18

## 2021-09-02 RX ORDER — LIDOCAINE 40 MG/G
CREAM TOPICAL
Status: DISCONTINUED | OUTPATIENT
Start: 2021-09-02 | End: 2021-09-02 | Stop reason: HOSPADM

## 2021-09-02 RX ORDER — CIPROFLOXACIN 500 MG/1
500 TABLET, FILM COATED ORAL DAILY
Status: ON HOLD | COMMUNITY
End: 2021-11-12

## 2021-09-02 RX ORDER — ALBUMIN (HUMAN) 12.5 G/50ML
12.5 SOLUTION INTRAVENOUS ONCE
Status: CANCELLED | OUTPATIENT
Start: 2021-09-02 | End: 2021-09-02

## 2021-09-02 RX ORDER — ALBUMIN (HUMAN) 12.5 G/50ML
12.5 SOLUTION INTRAVENOUS
Status: DISCONTINUED | OUTPATIENT
Start: 2021-09-02 | End: 2021-09-02 | Stop reason: HOSPADM

## 2021-09-02 RX ADMIN — ALBUMIN HUMAN 12.5 G: 0.25 SOLUTION INTRAVENOUS at 14:42

## 2021-09-02 RX ADMIN — LIDOCAINE HYDROCHLORIDE 10 ML: 10 INJECTION, SOLUTION EPIDURAL; INFILTRATION; INTRACAUDAL; PERINEURAL at 14:22

## 2021-09-02 RX ADMIN — ALBUMIN HUMAN 12.5 G: 0.25 SOLUTION INTRAVENOUS at 14:50

## 2021-09-02 RX ADMIN — ALBUMIN HUMAN 12.5 G: 0.25 SOLUTION INTRAVENOUS at 15:10

## 2021-09-02 RX ADMIN — ALBUMIN HUMAN 12.5 G: 0.25 SOLUTION INTRAVENOUS at 14:55

## 2021-09-02 RX ADMIN — ALBUMIN HUMAN 12.5 G: 0.25 SOLUTION INTRAVENOUS at 14:30

## 2021-09-02 NOTE — PROGRESS NOTES
[facility] Procedure Nursing Note    Patient Information  Name: Florian Nowak  Age: 41 year old    Procedure: Paracentesis: patient taken to department via cart. Ultrasound tech performed preliminary scan to find pockets of fluid. MD arrived to explain procedure, obtained consent. Time out was then done. Procedure begun, no issues, drainage in process. Patient was monitored with BP and O2 sats. Patient tolerated well. VSS. 5500 cc moose fluid removed from abdomen w/o difficulty. Bandaid applied to site. Albumin given per special order.     1445- Pt back to [facility] per cart.   Procedure start time: 1410  Procedure complete time: 1520 - last albumin finished and PIV removed.  Concerns/abnormal assessment: No  If abnormal assessment, provider notified: N/A  Plan/Other: return to [facility]    [facility] Discharge Nursing Note    Discharge Education:  Discharge instructions reviewed: Yes  Additional education/resources provided: all questions answered  Patient/patient representative verbalizes understanding: Yes  Patient discharging on new medications: No  Medication education completed: N/A    Discharge Plans:   Discharge location: home  Discharge ride contacted: Yes  Approximate discharge time: 1522    Discharge Criteria:  Discharge criteria met and vital signs stable: Yes    Patient Belongs:  Patient belongings returned to patient: Yes    Carley Ivey RN

## 2021-09-02 NOTE — PROCEDURES
[facility]    Procedure: IR Procedure Note    Date/Time: 9/2/2021 3:00 PM  Performed by: Kortney Schwartz MD  Authorized by: Kortney Schwartz MD     UNIVERSAL PROTOCOL   Site Marked: NA  Prior Images Obtained and Reviewed:  Yes  Required items: Required blood products, implants, devices and special equipment available    Patient identity confirmed:  Verbally with patient, arm band, provided demographic data and hospital-assigned identification number  Patient was reevaluated immediately before administering moderate or deep sedation or anesthesia  Confirmation Checklist:  Patient's identity using two indicators, relevant allergies, procedure was appropriate and matched the consent or emergent situation and correct equipment/implants were available  Time out: Immediately prior to the procedure a time out was called    Universal Protocol: the Joint Commission Universal Protocol was followed    Preparation: Patient was prepped and draped in usual sterile fashion           ANESTHESIA    Anesthesia: Local infiltration  Local Anesthetic:  Lidocaine 1% without epinephrine      SEDATION    Patient Sedated: No    Vital signs: Vital signs monitored during sedation    See dictated procedure note for full details.  Findings: Ascites    Specimens: fluid and/or tissue for gram stain and culture    Complications: None    Condition: Stable    PROCEDURE   Patient Tolerance:  Patient tolerated the procedure well with no immediate complications    Length of time physician/provider present for 1:1 monitoring during sedation: 0

## 2021-09-02 NOTE — PROGRESS NOTES
[facility] Admission Nursing Note    Patient Information  Name: Florian Nowak  Age: 41 year old  Reason for admission: Paracentesis, pt is here for COVID19 testing before paracentesis at 1400  [facility] arrival time: 1200    Visitor Information  Name: NA     Patient Admission/Assessment   Pre-procedure assessment complete: Yes  If abnormal assessment/labs, provider notified: N/A  NPO: N/A  Medications held per instructions/orders: NA    Consent: to be obtained     Patient oriented to room: Yes  Education/questions answered: Yes  Plan/other: Proceed as planned    Discharge Planning  Discharge name/phone number: [facility] [phone]   Overnight post sedation caregiver: HECTOR  Discharge location: home    Morelia Newsome RN

## 2021-09-02 NOTE — DISCHARGE INSTRUCTIONS

## 2021-09-08 ENCOUNTER — DOCUMENTATION ONLY (OUTPATIENT)
Dept: VASCULAR SURGERY | Facility: CLINIC | Age: 41
End: 2021-09-08

## 2021-09-08 ENCOUNTER — VIRTUAL VISIT (OUTPATIENT)
Dept: GASTROENTEROLOGY | Facility: CLINIC | Age: 41
End: 2021-09-08
Attending: STUDENT IN AN ORGANIZED HEALTH CARE EDUCATION/TRAINING PROGRAM
Payer: COMMERCIAL

## 2021-09-08 DIAGNOSIS — N17.9 AKI (ACUTE KIDNEY INJURY) (H): ICD-10-CM

## 2021-09-08 DIAGNOSIS — D68.9 COAGULOPATHY (H): ICD-10-CM

## 2021-09-08 DIAGNOSIS — K70.31 ALCOHOLIC CIRRHOSIS OF LIVER WITH ASCITES (H): Primary | ICD-10-CM

## 2021-09-08 DIAGNOSIS — K65.2 SBP (SPONTANEOUS BACTERIAL PERITONITIS) (H): ICD-10-CM

## 2021-09-08 DIAGNOSIS — E87.1 CHRONIC HYPONATREMIA: ICD-10-CM

## 2021-09-08 DIAGNOSIS — K92.2 ACUTE UPPER GI BLEEDING: ICD-10-CM

## 2021-09-08 PROCEDURE — 99215 OFFICE O/P EST HI 40 MIN: CPT | Mod: 95 | Performed by: STUDENT IN AN ORGANIZED HEALTH CARE EDUCATION/TRAINING PROGRAM

## 2021-09-08 NOTE — PROGRESS NOTES
[facility] Return Patient Visit    Date of Visit: September 8, 2021    Reason for referral: Alcohol related liver disease    Subjective: Mr. Nowak is a 41 year old man with a history of AUD, alcohol related cirrhosis, who presents for follow up of his decompensated cirrhosis    Initial History:     He was told he had KANG in his 30s while living in [place]. Was around 300 lbs then. At that time was drinking 5 drinks 5 days a week. Ferritin was elevated, did not get HC testing.     He reports he would drink 5 drinks 5 days a week roughly. He would have some periods of sobriety for a few months when trying to quit. Spring 2020 cut back to 2 glasses of wine a day. Was not feeling well, stopping drinking in October and then suddenly got bloated. Presented to urgent care 10/2020 found to have new ascites. BR was 5 at that time.     First few months could go 2-4 weeks between paracentesis, had to increase frequency. In May K noted to be high with DELLA (creatinine 3.4) - was admitted for this. Was taking hydrochlorothiazide, lisinopril and BB at that time. After this was getting twice a week paracentesis, sometimes up to 10 L. He does not eat processed foods, has cut back on his salt and now getting rob every 5-6 days, 6-10 liters with albumin replacement. Sent for cell count every time, no history of SBP. Not restricting fluid specifically because gets light headed with this, does not think he is drinking too much. Taking lasix 80 mg daily, dose increased a few weeks ago.     He has never done alcohol counseling. No history of DUIs or trouble at work but there was an instance where someone thought he smelled like alcohol, had to get tested and that was negative. He is committed to never drinking again, does not have cravings to drink.     Painful leg tremors/shaking/cramping at night - causing sleep deprivation. Started magnesium and iron for this. Having issues with insomnia.     No history  of HE, variceal bleeding. Had an EGD 6/2021 that showed grade 2 EVs with red whales, banded. Also severe PHG.     Had negative hepatitis B and C testing in the past. Getting vaccinated against hepatitis A/B.     Interval Events  - Admission for SBP 8/27/2021 - presented with abdominal pain and chills. Found to have SBP and strep salivarius bacteremia. Treated with IV ceftriaxone, then transitioned to augmentin, then cipro ppx. Hospital course complicated by DELLA (creatinine 1.95), got IV albumin, creatinine  improved on discharge to 1.19. Na stable in the mid 120s.  - Admission for variceal bleeding 9/4-7. Was at home with daughter on the weekend - felt really dizzy and lightheaded, passed out. Underwent an EGD - was banded x 5 on 9/5. Taking augment to complete this course for SBP ppx and then will go back to cipro daily. Hospital course c/b DELLA - creatinine 1.9, down to 1.6 on discharge. Taking lasix 40 mg daily.   - Today feels ok - still working, feels ok working up to 6 hours a day  - No current evidence of GI bleeding    ROS: 14 point ROS negative except for positives noted in HPI.    PMHx:  - AUD  - Alcohol related cirrhosis c/b refractory ascites  - Asthma  - HTN    PSHx:  - Back surgery microdisectomy    FamHx:  No family history of liver disease, liver cancer    SocHx:  Social History     Socioeconomic History     Marital status:      Spouse name: Not on file     Number of children: Not on file     Years of education: Not on file     Highest education level: Not on file   Occupational History     Not on file   Tobacco Use     Smoking status: Former Smoker     Smokeless tobacco: Never Used     Tobacco comment: College   Substance and Sexual Activity     Alcohol use: Not Currently     Drug use: Not Currently     Sexual activity: Not on file   Other Topics Concern     Not on file   Social History Narrative     Not on file     Social Determinants of Health     Financial Resource Strain:      Difficulty  of Paying Living Expenses:    Food Insecurity:      Worried About Running Out of Food in the Last Year:      Ran Out of Food in the Last Year:    Transportation Needs:      Lack of Transportation (Medical):      Lack of Transportation (Non-Medical):    Physical Activity:      Days of Exercise per Week:      Minutes of Exercise per Session:    Stress:      Feeling of Stress :    Social Connections:      Frequency of Communication with Friends and Family:      Frequency of Social Gatherings with Friends and Family:      Attends Islam Services:      Active Member of Clubs or Organizations:      Attends Club or Organization Meetings:      Marital Status:    Intimate Partner Violence:      Fear of Current or Ex-Partner:      Emotionally Abused:      Physically Abused:      Sexually Abused:    Lives with 5 year old daughter and wife  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   No NSAIDs    Allergies:  Allergies   Allergen Reactions     Ketorolac Tromethamine Other (See Comments)       Objective:  There were no vitals taken for this visit.  Constitutional: pleasant man in NAD  Eyes: non icteric  Respiratory: Normal respiratory excursion   Abd: Non distended  Skin: No jaundice  Psychiatric: normal mood and orientation    Labs:  Last Comprehensive Metabolic Panel:  Sodium   Date Value Ref Range Status   08/18/2021 125 (L) 133 - 144 mmol/L Final     Potassium   Date Value Ref Range Status   08/18/2021 5.3 3.4 - 5.3 mmol/L Final     Chloride   Date Value Ref Range Status    08/18/2021 95 94 - 109 mmol/L Final     Carbon Dioxide (CO2)   Date Value Ref Range Status   08/18/2021 21 20 - 32 mmol/L Final     Anion Gap   Date Value Ref Range Status   08/18/2021 9 3 - 14 mmol/L Final     Glucose   Date Value Ref Range Status   08/18/2021 129 (H) 70 - 99 mg/dL Final     Urea Nitrogen   Date Value Ref Range Status   08/18/2021 37 (H) 7 - 30 mg/dL Final     Creatinine   Date Value Ref Range Status   08/18/2021 1.17 0.66 - 1.25 mg/dL Final     GFR Estimate   Date Value Ref Range Status   08/18/2021 76 >60 mL/min/1.73m2 Final     Comment:     As of July 11, 2021, eGFR is calculated by the CKD-EPI creatinine equation, without race adjustment. eGFR can be influenced by muscle mass, exercise, and diet. The reported eGFR is an estimation only and is only applicable if the renal function is stable.     Calcium   Date Value Ref Range Status   08/18/2021 9.3 8.5 - 10.1 mg/dL Final     Bilirubin Total   Date Value Ref Range Status   08/18/2021 1.2 0.2 - 1.3 mg/dL Final     Alkaline Phosphatase   Date Value Ref Range Status   08/18/2021 101 40 - 150 U/L Final     ALT   Date Value Ref Range Status   08/18/2021 54 0 - 70 U/L Final     AST   Date Value Ref Range Status   08/18/2021 50 (H) 0 - 45 U/L Final       Lab Results   Component Value Date    WBC 8.5 08/18/2021     Lab Results   Component Value Date    RBC 3.26 08/18/2021     Lab Results   Component Value Date    HGB 9.7 08/18/2021     Lab Results   Component Value Date    HCT 29.0 08/18/2021     Lab Results   Component Value Date    MCV 89 08/18/2021     Lab Results   Component Value Date    MCH 29.8 08/18/2021     Lab Results   Component Value Date    MCHC 33.4 08/18/2021     Lab Results   Component Value Date    RDW 14.2 08/18/2021     Lab Results   Component Value Date     08/18/2021       INR   Date Value Ref Range Status   08/18/2021 1.21 (H) 0.85 - 1.15 Final     Comment:     Effective 7/11/2021, the reference range for this assay has  changed.     Last Comprehensive Metabolic Panel:  Sodium   Date Value Ref Range Status   09/10/2021 127 (L) 133 - 144 mmol/L Final     Potassium   Date Value Ref Range Status   09/10/2021 4.7 3.4 - 5.3 mmol/L Final     Chloride   Date Value Ref Range Status   09/10/2021 100 94 - 109 mmol/L Final     Carbon Dioxide (CO2)   Date Value Ref Range Status   09/10/2021 17 (L) 20 - 32 mmol/L Final     Anion Gap   Date Value Ref Range Status   09/10/2021 10 3 - 14 mmol/L Final     Glucose   Date Value Ref Range Status   09/10/2021 69 (L) 70 - 99 mg/dL Final     Urea Nitrogen   Date Value Ref Range Status   09/10/2021 11 7 - 30 mg/dL Final     Creatinine   Date Value Ref Range Status   09/10/2021 1.50 (H) 0.66 - 1.25 mg/dL Final     GFR Estimate   Date Value Ref Range Status   09/10/2021 56 (L) >60 mL/min/1.73m2 Final     Comment:     As of July 11, 2021, eGFR is calculated by the CKD-EPI creatinine equation, without race adjustment. eGFR can be influenced by muscle mass, exercise, and diet. The reported eGFR is an estimation only and is only applicable if the renal function is stable.     Calcium   Date Value Ref Range Status   09/10/2021 8.1 (L) 8.5 - 10.1 mg/dL Final     Bilirubin Total   Date Value Ref Range Status   09/10/2021 1.4 (H) 0.2 - 1.3 mg/dL Final     Alkaline Phosphatase   Date Value Ref Range Status   09/10/2021 74 40 - 150 U/L Final     ALT   Date Value Ref Range Status   09/10/2021 23 0 - 70 U/L Final     AST   Date Value Ref Range Status   09/10/2021 18 0 - 45 U/L Final             Lab Results   Component Value Date    WBC 8.3 09/10/2021     Lab Results   Component Value Date    RBC 2.64 09/10/2021     Lab Results   Component Value Date    HGB 7.9 09/10/2021     Lab Results   Component Value Date    HCT 24.3 09/10/2021     No components found for: MCT  Lab Results   Component Value Date    MCV 92 09/10/2021     Lab Results   Component Value Date    MCH 29.9 09/10/2021     Lab Results   Component Value Date     MCHC 32.5 09/10/2021     Lab Results   Component Value Date    RDW 14.4 09/10/2021     Lab Results   Component Value Date     09/10/2021     MELD-Na score: 27 at 9/10/2021  9:49 AM  MELD score: 20 at 9/10/2021  9:49 AM  Calculated from:  Serum Creatinine: 1.50 mg/dL at 9/10/2021  9:49 AM  Serum Sodium: 127 mmol/L at 9/10/2021  9:49 AM  Total Bilirubin: 1.4 mg/dL at 9/10/2021  9:49 AM  INR(ratio): 2.18 at 9/10/2021  9:49 AM  Age: 41 years    Imaging:    CT AP 8/27/2021    ABDOMEN/PELVIS:     No free intraperitoneal air. Large volume ascites. Ascites extends into a noninflamed umbilical ventral wall hernia.     Heterogeneous and cirrhotic appearance of the liver. The portal venous system appears patent. Portosystemic collateralization includes but is not limited to gastroesophageal varices. The spleen is mildly enlarged at 16 cm oblique craniocaudad dimension.     Gallbladder, pancreas, adrenals, kidneys, and pelvic organs are unremarkable.     No bowel obstruction. Mild wall thickening of multiple small bowel loops and portions of the colon may reflect portal enteropathy and colopathy. The appendix is normal.     Small lymph nodes are scattered throughout the upper abdomen, mesentery, and retroperitoneum. These are nonspecific, but likely reactive in a patient with chronic liver disease.     MUSCULOSKELETAL: No aggressive appearing bone lesion.       IMPRESSION:     1. Heterogeneous and cirrhotic appearing liver with portosystemic collateralization and mild splenomegaly.   2. Large volume ascites.   3. Mild wall thickening of multiple small bowel loops and portions of the colon likely reflects portal enteropathy and colopathy, though other types of enteritis and colitis are not excluded.   4. Noninflamed umbilical hernia containing ascites.    RUQ US 5/17/2021    Aorta and IVC: See comments..     Pancreas: Partially obscured but visualized portions are unremarkable.     Liver: Nodular contour.  Increased  echogenicity.     Liver Elastography: Could not be performed due to ascites     CBD: Not visualized     Gallbladder: Nondistended. Mild gallbladder wall thickening to 0.4 cm likely due to chronic liver disease. 3 mm polyp or mural adherent sludge ball.     Sonographic Mesa's Sign: No.     Right Kidney: Measures 12.3 x 5.1 x 5.9 cm. Appears unremarkable.     Left Kidney: Measures  12.3 x 5.6 x 4.7 cm. Appears unremarkable.     Spleen: 14 cm in length.     Ascites: Yes.     IMPRESSION:       1. Cirrhotic-appearing liver with ascites and mild splenomegaly. No visualized focal liver abnormality.     2. Liver elastography could not be performed due to ascites.     3. 3 mm gallbladder polyp or mural adherent sludge ball, this requires no specific follow-up due to its small size.    Endoscopy:    EGD 6/14 Grade 2 EVS with red whale s/p banding x3, severe PHG      Findings:        Grade III, large (> 5 mm) varices were found in the        middle third of the esophagus and in the lower third        of the esophagus. They were 10 mm in largest        diameter. Five bands were successfully placed with        complete eradication, resulting in deflation of        varices. Bleeding/oozing had stopped at the end of        the procedure.        Clotted blood was found in the gastric fundus. Fluid        aspiration was performed.        There is no endoscopic evidence of varices in the        gastric fundus.        The examined duodenum was normal.   Impression:          - Grade III and large (> 5 mm)                        esophageal varices with red nikki.                        Completely eradicated. Banded from                        41cm to 35cm incisors.                        - Clotted blood in the gastric                        fundus. Cleared. There is no                        endoscopic evidence of varices in the                        gastric fundus.                        - Normal examined duodenum.        Independently reviewed labs and imaging.      Assessment/Plan: Mr. Nowak is a 41 year old man with a history of AUD, alcohol related cirrhosis, who presents for follow up of his decompensated cirrhosis.     His liver disease has been complicated by ascites, intolerant to diuretics, requiring serial paracentesis. Recently admitted for SBP and then a variceal bleed.     Discussed the natural history of alcohol related liver disease, he continues to have issues with ascites > 6 months sober. Seeing IR next week to discuss TIPS. Given his recent SBP and variceal bleeding, will also started referral for liver transplant. Needs to do his CD assessment.     Recently had had issues with DELLA - creatinine 1.5, still slightly above baseline.     - Upcoming visit with IR to discuss TIPS. MELD 20, MELD Na 27 but related to sodium. Sent message to  to set up TTE  - Will open liver transplant evaluation given worsening decompensation, discussed this is mainly a back up if not felt to be a good TIPS candidate or worsens afterwards  - CD referral - discussed this important for sobriety and liver transplant   - Continue paracentesis weekly PRN 6 L max with 12.5 grams/L albumin replacement  - Would stop lasix 40 mg daily - creatinine not completely back to baseline  - Continue cipro for SBP ppx  - Continue low sodium diet and recommend 2 L fluid restriction with his sodium  - Will enroll in complex care management    RTC 6 weeks or in transplant clinic    Cadence Murray MD MS  Hepatology/Liver Transplant  [facility]    Video Visit  Start 4:09  End 4:35

## 2021-09-08 NOTE — LETTER
9/8/2021         RE: Florian Nowak  [address] [facility] [address]        Dear Colleague,    Thank you for referring your patient, Florian Nowak, to the [facility] [place]. Please see a copy of my visit note below.    [facility] Return Patient Visit    Date of Visit: September 8, 2021    Reason for referral: Alcohol related liver disease    Subjective: Mr. Nowak is a 41 year old man with a history of AUD, alcohol related cirrhosis, who presents for follow up of his decompensated cirrhosis    Initial History:     He was told he had KANG in his 30s while living in [place]. Was around 300 lbs then. At that time was drinking 5 drinks 5 days a week. Ferritin was elevated, did not get HC testing.     He reports he would drink 5 drinks 5 days a week roughly. He would have some periods of sobriety for a few months when trying to quit. Spring 2020 cut back to 2 glasses of wine a day. Was not feeling well, stopping drinking in October and then suddenly got bloated. Presented to urgent care 10/2020 found to have new ascites. BR was 5 at that time.     First few months could go 2-4 weeks between paracentesis, had to increase frequency. In May K noted to be high with DELLA (creatinine 3.4) - was admitted for this. Was taking hydrochlorothiazide, lisinopril and BB at that time. After this was getting twice a week paracentesis, sometimes up to 10 L. He does not eat processed foods, has cut back on his salt and now getting rob every 5-6 days, 6-10 liters with albumin replacement. Sent for cell count every time, no history of SBP. Not restricting fluid specifically because gets light headed with this, does not think he is drinking too much. Taking lasix 80 mg daily, dose increased a few weeks ago.     He has never done alcohol counseling. No history of DUIs or trouble at work but there was an instance where someone thought he smelled like alcohol,  had to get tested and that was negative. He is committed to never drinking again, does not have cravings to drink.     Painful leg tremors/shaking/cramping at night - causing sleep deprivation. Started magnesium and iron for this. Having issues with insomnia.     No history of HE, variceal bleeding. Had an EGD 6/2021 that showed grade 2 EVs with red whales, banded. Also severe PHG.     Had negative hepatitis B and C testing in the past. Getting vaccinated against hepatitis A/B.     Interval Events  - Admission for SBP 8/27/2021 - presented with abdominal pain and chills. Found to have SBP and strep salivarius bacteremia. Treated with IV ceftriaxone, then transitioned to augmentin, then cipro ppx. Hospital course complicated by DELLA (creatinine 1.95), got IV albumin, creatinine  improved on discharge to 1.19. Na stable in the mid 120s.  - Admission for variceal bleeding 9/4-7. Was at home with daughter on the weekend - felt really dizzy and lightheaded, passed out. Underwent an EGD - was banded x 5 on 9/5. Taking augment to complete this course for SBP ppx and then will go back to cipro daily. Hospital course c/b DELLA - creatinine 1.9, down to 1.6 on discharge. Taking lasix 40 mg daily.   - Today feels ok - still working, feels ok working up to 6 hours a day  - No current evidence of GI bleeding    ROS: 14 point ROS negative except for positives noted in HPI.    PMHx:  - AUD  - Alcohol related cirrhosis c/b refractory ascites  - Asthma  - HTN    PSHx:  - Back surgery microdisectomy    FamHx:  No family history of liver disease, liver cancer    SocHx:  Social History     Socioeconomic History     Marital status:      Spouse name: Not on file     Number of children: Not on file     Years of education: Not on file     Highest education level: Not on file   Occupational History     Not on file   Tobacco Use     Smoking status: Former Smoker     Smokeless tobacco: Never Used     Tobacco comment: College   Substance  and Sexual Activity     Alcohol use: Not Currently     Drug use: Not Currently     Sexual activity: Not on file   Other Topics Concern     Not on file   Social History Narrative     Not on file     Social Determinants of Health     Financial Resource Strain:      Difficulty of Paying Living Expenses:    Food Insecurity:      Worried About Running Out of Food in the Last Year:      Ran Out of Food in the Last Year:    Transportation Needs:      Lack of Transportation (Medical):      Lack of Transportation (Non-Medical):    Physical Activity:      Days of Exercise per Week:      Minutes of Exercise per Session:    Stress:      Feeling of Stress :    Social Connections:      Frequency of Communication with Friends and Family:      Frequency of Social Gatherings with Friends and Family:      Attends Jew Services:      Active Member of Clubs or Organizations:      Attends Club or Organization Meetings:      Marital Status:    Intimate Partner Violence:      Fear of Current or Ex-Partner:      Emotionally Abused:      Physically Abused:      Sexually Abused:    Lives with 5 year old daughter and wife  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   No NSAIDs    Allergies:  Allergies   Allergen Reactions     Ketorolac Tromethamine Other (See Comments)       Objective:  There were no vitals taken for this visit.  Constitutional: pleasant man in NAD  Eyes: non icteric  Respiratory: Normal respiratory excursion   Abd: Non distended  Skin: No jaundice  Psychiatric: normal  mood and orientation    Labs:  Last Comprehensive Metabolic Panel:  Sodium   Date Value Ref Range Status   08/18/2021 125 (L) 133 - 144 mmol/L Final     Potassium   Date Value Ref Range Status   08/18/2021 5.3 3.4 - 5.3 mmol/L Final     Chloride   Date Value Ref Range Status   08/18/2021 95 94 - 109 mmol/L Final     Carbon Dioxide (CO2)   Date Value Ref Range Status   08/18/2021 21 20 - 32 mmol/L Final     Anion Gap   Date Value Ref Range Status   08/18/2021 9 3 - 14 mmol/L Final     Glucose   Date Value Ref Range Status   08/18/2021 129 (H) 70 - 99 mg/dL Final     Urea Nitrogen   Date Value Ref Range Status   08/18/2021 37 (H) 7 - 30 mg/dL Final     Creatinine   Date Value Ref Range Status   08/18/2021 1.17 0.66 - 1.25 mg/dL Final     GFR Estimate   Date Value Ref Range Status   08/18/2021 76 >60 mL/min/1.73m2 Final     Comment:     As of July 11, 2021, eGFR is calculated by the CKD-EPI creatinine equation, without race adjustment. eGFR can be influenced by muscle mass, exercise, and diet. The reported eGFR is an estimation only and is only applicable if the renal function is stable.     Calcium   Date Value Ref Range Status   08/18/2021 9.3 8.5 - 10.1 mg/dL Final     Bilirubin Total   Date Value Ref Range Status   08/18/2021 1.2 0.2 - 1.3 mg/dL Final     Alkaline Phosphatase   Date Value Ref Range Status   08/18/2021 101 40 - 150 U/L Final     ALT   Date Value Ref Range Status   08/18/2021 54 0 - 70 U/L Final     AST   Date Value Ref Range Status   08/18/2021 50 (H) 0 - 45 U/L Final       Lab Results   Component Value Date    WBC 8.5 08/18/2021     Lab Results   Component Value Date    RBC 3.26 08/18/2021     Lab Results   Component Value Date    HGB 9.7 08/18/2021     Lab Results   Component Value Date    HCT 29.0 08/18/2021     Lab Results   Component Value Date    MCV 89 08/18/2021     Lab Results   Component Value Date    MCH 29.8 08/18/2021     Lab Results   Component Value Date    MCHC 33.4 08/18/2021      Lab Results   Component Value Date    RDW 14.2 08/18/2021     Lab Results   Component Value Date     08/18/2021       INR   Date Value Ref Range Status   08/18/2021 1.21 (H) 0.85 - 1.15 Final     Comment:     Effective 7/11/2021, the reference range for this assay has changed.     Last Comprehensive Metabolic Panel:  Sodium   Date Value Ref Range Status   09/10/2021 127 (L) 133 - 144 mmol/L Final     Potassium   Date Value Ref Range Status   09/10/2021 4.7 3.4 - 5.3 mmol/L Final     Chloride   Date Value Ref Range Status   09/10/2021 100 94 - 109 mmol/L Final     Carbon Dioxide (CO2)   Date Value Ref Range Status   09/10/2021 17 (L) 20 - 32 mmol/L Final     Anion Gap   Date Value Ref Range Status   09/10/2021 10 3 - 14 mmol/L Final     Glucose   Date Value Ref Range Status   09/10/2021 69 (L) 70 - 99 mg/dL Final     Urea Nitrogen   Date Value Ref Range Status   09/10/2021 11 7 - 30 mg/dL Final     Creatinine   Date Value Ref Range Status   09/10/2021 1.50 (H) 0.66 - 1.25 mg/dL Final     GFR Estimate   Date Value Ref Range Status   09/10/2021 56 (L) >60 mL/min/1.73m2 Final     Comment:     As of July 11, 2021, eGFR is calculated by the CKD-EPI creatinine equation, without race adjustment. eGFR can be influenced by muscle mass, exercise, and diet. The reported eGFR is an estimation only and is only applicable if the renal function is stable.     Calcium   Date Value Ref Range Status   09/10/2021 8.1 (L) 8.5 - 10.1 mg/dL Final     Bilirubin Total   Date Value Ref Range Status   09/10/2021 1.4 (H) 0.2 - 1.3 mg/dL Final     Alkaline Phosphatase   Date Value Ref Range Status   09/10/2021 74 40 - 150 U/L Final     ALT   Date Value Ref Range Status   09/10/2021 23 0 - 70 U/L Final     AST   Date Value Ref Range Status   09/10/2021 18 0 - 45 U/L Final             Lab Results   Component Value Date    WBC 8.3 09/10/2021     Lab Results   Component Value Date    RBC 2.64 09/10/2021     Lab Results   Component Value  Date    HGB 7.9 09/10/2021     Lab Results   Component Value Date    HCT 24.3 09/10/2021     No components found for: MCT  Lab Results   Component Value Date    MCV 92 09/10/2021     Lab Results   Component Value Date    MCH 29.9 09/10/2021     Lab Results   Component Value Date    MCHC 32.5 09/10/2021     Lab Results   Component Value Date    RDW 14.4 09/10/2021     Lab Results   Component Value Date     09/10/2021     MELD-Na score: 27 at 9/10/2021  9:49 AM  MELD score: 20 at 9/10/2021  9:49 AM  Calculated from:  Serum Creatinine: 1.50 mg/dL at 9/10/2021  9:49 AM  Serum Sodium: 127 mmol/L at 9/10/2021  9:49 AM  Total Bilirubin: 1.4 mg/dL at 9/10/2021  9:49 AM  INR(ratio): 2.18 at 9/10/2021  9:49 AM  Age: 41 years    Imaging:    CT AP 8/27/2021    ABDOMEN/PELVIS:     No free intraperitoneal air. Large volume ascites. Ascites extends into a noninflamed umbilical ventral wall hernia.     Heterogeneous and cirrhotic appearance of the liver. The portal venous system appears patent. Portosystemic collateralization includes but is not limited to gastroesophageal varices. The spleen is mildly enlarged at 16 cm oblique craniocaudad dimension.     Gallbladder, pancreas, adrenals, kidneys, and pelvic organs are unremarkable.     No bowel obstruction. Mild wall thickening of multiple small bowel loops and portions of the colon may reflect portal enteropathy and colopathy. The appendix is normal.     Small lymph nodes are scattered throughout the upper abdomen, mesentery, and retroperitoneum. These are nonspecific, but likely reactive in a patient with chronic liver disease.     MUSCULOSKELETAL: No aggressive appearing bone lesion.       IMPRESSION:     1. Heterogeneous and cirrhotic appearing liver with portosystemic collateralization and mild splenomegaly.   2. Large volume ascites.   3. Mild wall thickening of multiple small bowel loops and portions of the colon likely reflects portal enteropathy and colopathy,  though other types of enteritis and colitis are not excluded.   4. Noninflamed umbilical hernia containing ascites.    RUQ US 5/17/2021    Aorta and IVC: See comments..     Pancreas: Partially obscured but visualized portions are unremarkable.     Liver: Nodular contour.  Increased echogenicity.     Liver Elastography: Could not be performed due to ascites     CBD: Not visualized     Gallbladder: Nondistended. Mild gallbladder wall thickening to 0.4 cm likely due to chronic liver disease. 3 mm polyp or mural adherent sludge ball.     Sonographic Mesa's Sign: No.     Right Kidney: Measures 12.3 x 5.1 x 5.9 cm. Appears unremarkable.     Left Kidney: Measures  12.3 x 5.6 x 4.7 cm. Appears unremarkable.     Spleen: 14 cm in length.     Ascites: Yes.     IMPRESSION:       1. Cirrhotic-appearing liver with ascites and mild splenomegaly. No visualized focal liver abnormality.     2. Liver elastography could not be performed due to ascites.     3. 3 mm gallbladder polyp or mural adherent sludge ball, this requires no specific follow-up due to its small size.    Endoscopy:    EGD 6/14 Grade 2 EVS with red whale s/p banding x3, severe PHG      Findings:        Grade III, large (> 5 mm) varices were found in the        middle third of the esophagus and in the lower third        of the esophagus. They were 10 mm in largest        diameter. Five bands were successfully placed with        complete eradication, resulting in deflation of        varices. Bleeding/oozing had stopped at the end of        the procedure.        Clotted blood was found in the gastric fundus. Fluid        aspiration was performed.        There is no endoscopic evidence of varices in the        gastric fundus.        The examined duodenum was normal.   Impression:          - Grade III and large (> 5 mm)                        esophageal varices with red nikki.                        Completely eradicated. Banded from                        41cm to 35cm  incisors.                        - Clotted blood in the gastric                        fundus. Cleared. There is no                        endoscopic evidence of varices in the                        gastric fundus.                        - Normal examined duodenum.       Independently reviewed labs and imaging.      Assessment/Plan: Mr. Nowak is a 41 year old man with a history of AUD, alcohol related cirrhosis, who presents for follow up of his decompensated cirrhosis.     His liver disease has been complicated by ascites, intolerant to diuretics, requiring serial paracentesis. Recently admitted for SBP and then a variceal bleed.     Discussed the natural history of alcohol related liver disease, he continues to have issues with ascites > 6 months sober. Seeing IR next week to discuss TIPS. Given his recent SBP and variceal bleeding, will also started referral for liver transplant. Needs to do his CD assessment.     Recently had had issues with DELLA - creatinine 1.5, still slightly above baseline.     - Upcoming visit with IR to discuss TIPS. MELD 20, MELD Na 27 but related to sodium. Sent message to  to set up TTE  - Will open liver transplant evaluation given worsening decompensation, discussed this is mainly a back up if not felt to be a good TIPS candidate or worsens afterwards  - CD referral - discussed this important for sobriety and liver transplant   - Continue paracentesis weekly PRN 6 L max with 12.5 grams/L albumin replacement  - Would stop lasix 40 mg daily - creatinine not completely back to baseline  - Continue cipro for SBP ppx  - Continue low sodium diet and recommend 2 L fluid restriction with his sodium  - Will enroll in complex care management    RTC 6 weeks or in transplant clinic    Cadence Murray MD MS  Hepatology/Liver Transplant  [facility]    Video Visit  Start 4:09  End 4:35          Again, thank you for allowing me to participate in the care of your patient.         Sincerely,        Cadence Murray MD

## 2021-09-09 ENCOUNTER — HOSPITAL ENCOUNTER (OUTPATIENT)
Facility: CLINIC | Age: 41
End: 2021-09-09
Payer: COMMERCIAL

## 2021-09-09 ENCOUNTER — TELEPHONE (OUTPATIENT)
Dept: GASTROENTEROLOGY | Facility: CLINIC | Age: 41
End: 2021-09-09

## 2021-09-09 DIAGNOSIS — K70.31 ALCOHOLIC CIRRHOSIS OF LIVER WITH ASCITES (H): Primary | ICD-10-CM

## 2021-09-09 PROBLEM — K92.2 ACUTE UPPER GI BLEEDING: Status: ACTIVE | Noted: 2021-09-04

## 2021-09-09 PROBLEM — E87.1 CHRONIC HYPONATREMIA: Status: ACTIVE | Noted: 2021-08-27

## 2021-09-09 PROBLEM — N17.9 AKI (ACUTE KIDNEY INJURY) (H): Status: ACTIVE | Noted: 2021-05-06

## 2021-09-09 PROBLEM — D62 ACUTE BLOOD LOSS ANEMIA: Status: ACTIVE | Noted: 2021-09-04

## 2021-09-09 PROBLEM — E78.5 HYPERLIPIDEMIA: Status: ACTIVE | Noted: 2018-01-08

## 2021-09-09 PROBLEM — D68.9 COAGULOPATHY (H): Status: ACTIVE | Noted: 2021-09-04

## 2021-09-09 PROBLEM — K65.2 SBP (SPONTANEOUS BACTERIAL PERITONITIS) (H): Status: ACTIVE | Noted: 2021-08-27

## 2021-09-10 ENCOUNTER — PATIENT OUTREACH (OUTPATIENT)
Dept: GASTROENTEROLOGY | Facility: CLINIC | Age: 41
End: 2021-09-10

## 2021-09-10 ENCOUNTER — LAB (OUTPATIENT)
Dept: LAB | Facility: CLINIC | Age: 41
End: 2021-09-10
Payer: COMMERCIAL

## 2021-09-10 DIAGNOSIS — K70.31 ALCOHOLIC CIRRHOSIS OF LIVER WITH ASCITES (H): ICD-10-CM

## 2021-09-10 LAB
ALBUMIN SERPL-MCNC: 3.5 G/DL (ref 3.4–5)
ALP SERPL-CCNC: 74 U/L (ref 40–150)
ALT SERPL W P-5'-P-CCNC: 23 U/L (ref 0–70)
ANION GAP SERPL CALCULATED.3IONS-SCNC: 10 MMOL/L (ref 3–14)
AST SERPL W P-5'-P-CCNC: 18 U/L (ref 0–45)
BILIRUB DIRECT SERPL-MCNC: 0.5 MG/DL (ref 0–0.2)
BILIRUB SERPL-MCNC: 1.4 MG/DL (ref 0.2–1.3)
BUN SERPL-MCNC: 11 MG/DL (ref 7–30)
CALCIUM SERPL-MCNC: 8.1 MG/DL (ref 8.5–10.1)
CHLORIDE BLD-SCNC: 100 MMOL/L (ref 94–109)
CO2 SERPL-SCNC: 17 MMOL/L (ref 20–32)
CREAT SERPL-MCNC: 1.5 MG/DL (ref 0.66–1.25)
ERYTHROCYTE [DISTWIDTH] IN BLOOD BY AUTOMATED COUNT: 14.4 % (ref 10–15)
GFR SERPL CREATININE-BSD FRML MDRD: 56 ML/MIN/1.73M2
GLUCOSE BLD-MCNC: 69 MG/DL (ref 70–99)
HCT VFR BLD AUTO: 24.3 % (ref 40–53)
HGB BLD-MCNC: 7.9 G/DL (ref 13.3–17.7)
INR PPP: 2.18 (ref 0.85–1.15)
MCH RBC QN AUTO: 29.9 PG (ref 26.5–33)
MCHC RBC AUTO-ENTMCNC: 32.5 G/DL (ref 31.5–36.5)
MCV RBC AUTO: 92 FL (ref 78–100)
PLATELET # BLD AUTO: 216 10E3/UL (ref 150–450)
POTASSIUM BLD-SCNC: 4.7 MMOL/L (ref 3.4–5.3)
PROT SERPL-MCNC: 6.8 G/DL (ref 6.8–8.8)
RBC # BLD AUTO: 2.64 10E6/UL (ref 4.4–5.9)
SODIUM SERPL-SCNC: 127 MMOL/L (ref 133–144)
WBC # BLD AUTO: 8.3 10E3/UL (ref 4–11)

## 2021-09-10 PROCEDURE — 85610 PROTHROMBIN TIME: CPT

## 2021-09-10 PROCEDURE — 36415 COLL VENOUS BLD VENIPUNCTURE: CPT

## 2021-09-10 PROCEDURE — 80053 COMPREHEN METABOLIC PANEL: CPT

## 2021-09-10 PROCEDURE — 82248 BILIRUBIN DIRECT: CPT

## 2021-09-10 PROCEDURE — 85027 COMPLETE CBC AUTOMATED: CPT

## 2021-09-10 NOTE — PROGRESS NOTES
Per Dr. Murray, pt would benefit from enrollment in care coordination. Pt is a 41 year old male with PMH significant for alcoholic cirrhosis complicated by ascites, intolerance to diuretics, SBP, and variceal bleeding. Pt had recent admission at OSH for GI bleed and EGD found bleeding varices and banding done. Pt was discharged on oral Augmentin with plan to transition to Cipro 500 mg daily after course of Augmentin completed.     Pt had follow up appointment on 9/8 and plan established per Dr. Murray:  1) Repeat labs- Pt got labs drawn today (9/10)  2) Continue current dose of diuretics- Lasix 40 mg daily   3) Continue weekly paracentesis with 6 liter drain limit and 12.5 grams of albumin per liter removed- Pt goes to [facility] for weekly paracenetesis and next paracentesis scheduled for 9/13  4) Continue complete abstinence from alcohol  5) CD referral  6) Transplant evaluation referral  7) TTE and pt referred for TIPS- Echo scheduled on 9/17 and TIPS consult on 9/15  8) 2,000 mg sodium restricted diet  9) 2 liter fluid restriction for hyponatremia  10) Ciprofloxacin 500 mg daily for SBP prophylaxis  12) RTC in 6 weeks- Still needs return appointment scheduled    Called pt to introduce self and RN Care Coordinator role. Reviewed plan of care and plan to check in with pt in 1 week. Pt verbalized understanding and is agreeable to plan.       ASCITES: Yes  - History of SBP: Yes  - Meets criteria for SBP ppx: Yes  [History of SBP and/or ascites total protein < 1.5g/dL AND either SCr  >1.2 , Na <130 or TB>3]

## 2021-09-13 ENCOUNTER — TRANSFERRED RECORDS (OUTPATIENT)
Dept: HEALTH INFORMATION MANAGEMENT | Facility: CLINIC | Age: 41
End: 2021-09-13

## 2021-09-14 VITALS — WEIGHT: 240 LBS | BODY MASS INDEX: 32.51 KG/M2 | HEIGHT: 72 IN

## 2021-09-14 DIAGNOSIS — K70.31 ALCOHOLIC CIRRHOSIS OF LIVER WITH ASCITES (H): Primary | ICD-10-CM

## 2021-09-14 ASSESSMENT — MIFFLIN-ST. JEOR: SCORE: 2021.63

## 2021-09-14 NOTE — TELEPHONE ENCOUNTER
Patient was asked the following questions during liver intake call.     Referring Provider: Dr. Christina Fabian   Referring Diagnosis: Alcoholic Cirrhosis of the Liver   PCP: Dr. Perez Salcido     1)Do you know why you have liver disease: Yes       If Alcoholic Cirrhosis is present when was your last drink: 10/2020       Have you ever been through treatment for alcohol: Yes  2) Presence of Ascites: Yes Paracentesis: Yes  3) Presence of Hepatic Encephalopathy: No Medications: No  4) History of GI Bleeding: Yes   5) Oxygen Use: No  6) EGD: Yes Where: [facility]  When: 2021  7) Colonoscopy: No  8) MELD Score: 27  9) Labs available for review from PCP/GI: Yes   10) HCC Diagnosis: No  11)Insurance information: [facility]      Policy tai: Self       Subscriber/policy/ID number: [id number]      Group Number: [id number]    Referral intake process completed.  Patient is aware that after financial approval is received, medical records will be requested.   Patient confirmed for a callback from transplant coordinator on 9/23/2021.  Tentative evaluation date TBD.    Confirmed coordinator will discuss evaluation process in more detail at the time of their call.   Patient is aware of the need to arrange age appropriate cancer screening, vaccinations, and dental care.  Reminded patient to complete questionnaire, complete medical records release, and review packet prior to evaluation visit .  Assessed patient for special needs (ie--wheelchair, assistance, guardian, and ):  None   Patient instructed to call [phone] with questions.     Patient gave verbal consent during intake call to obtain medical records and documents outside of [facility]:  Yes     MALENA Hollins, LPN   Solid Organ Transplant

## 2021-09-15 ENCOUNTER — ANCILLARY PROCEDURE (OUTPATIENT)
Dept: ULTRASOUND IMAGING | Facility: CLINIC | Age: 41
End: 2021-09-15
Attending: RADIOLOGY
Payer: COMMERCIAL

## 2021-09-15 ENCOUNTER — OFFICE VISIT (OUTPATIENT)
Dept: RADIOLOGY | Facility: CLINIC | Age: 41
End: 2021-09-15
Payer: COMMERCIAL

## 2021-09-15 VITALS
SYSTOLIC BLOOD PRESSURE: 138 MMHG | OXYGEN SATURATION: 99 % | TEMPERATURE: 99.4 F | DIASTOLIC BLOOD PRESSURE: 78 MMHG | BODY MASS INDEX: 31.42 KG/M2 | HEART RATE: 103 BPM | WEIGHT: 232 LBS | RESPIRATION RATE: 16 BRPM | HEIGHT: 72 IN

## 2021-09-15 DIAGNOSIS — K70.31 ALCOHOLIC CIRRHOSIS OF LIVER WITH ASCITES (H): Primary | ICD-10-CM

## 2021-09-15 DIAGNOSIS — K70.31 ALCOHOLIC CIRRHOSIS OF LIVER WITH ASCITES (H): ICD-10-CM

## 2021-09-15 PROCEDURE — G0463 HOSPITAL OUTPT CLINIC VISIT: HCPCS

## 2021-09-15 PROCEDURE — 99203 OFFICE O/P NEW LOW 30 MIN: CPT | Performed by: RADIOLOGY

## 2021-09-15 PROCEDURE — 93975 VASCULAR STUDY: CPT | Performed by: RADIOLOGY

## 2021-09-15 ASSESSMENT — MIFFLIN-ST. JEOR: SCORE: 1985.35

## 2021-09-15 ASSESSMENT — PAIN SCALES - GENERAL: PAINLEVEL: MILD PAIN (3)

## 2021-09-15 NOTE — NURSING NOTE
Oncology Rooming Note    September 15, 2021 7:56 AM   Florian Nowak is a 41 year old male who presents for:    Chief Complaint   Patient presents with     Oncology Clinic Visit     Alcoholic cirrhosis of liver with ascites      Initial Vitals: /78   Pulse 103   Temp 99.4  F (37.4  C)   Resp 16   Ht 1.829 m (6')   Wt 105.2 kg (232 lb)   SpO2 99%   BMI 31.46 kg/m   Estimated body mass index is 31.46 kg/m  as calculated from the following:    Height as of this encounter: 1.829 m (6').    Weight as of this encounter: 105.2 kg (232 lb). Body surface area is 2.31 meters squared.  Mild Pain (3) Comment: Data Unavailable   No LMP for male patient.  Allergies reviewed: Yes  Medications reviewed: Yes    Medications: Medication refills not needed today.  Pharmacy name entered into [facility]: [id number] - [place], [place] - [address] AT [facility] [address]    Clinical concerns: New patient        Mikel Franklin MA

## 2021-09-15 NOTE — PROGRESS NOTES
"    INTERVENTIONAL RADIOLOGY CONSULTATION    Name: Florian Nowak  Age: 41 year old   Referring Physician: Dr. Murray  REASON FOR REFERRAL: portal hypertension    HPI:     Mr. Nowak is referred to consider TIPS placement for GI bleeding and lifestyle limiting and refractory ascites. His wife participated in the visit via telephone. Mr. Eliu mcneill is seen in person.    History of significant alcohol use until Feb-April 2020. \"DSM 2-3 standards\". Last drink was October 2020.     He was diagnosed with liver disease per his report in [place] approximately 10 years ago. 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 The ED there did a battery of tests that pointed to liver issues, and he was diagnosed with what he reports as \"AFLD\".     He did not note significant problems until October 2020, when he developed abdominal swelling and \"lost energy\". He noted this come on quite quickly, over the course of a few weeks. No jaundice or encephalopathy. Has GI bleeding hx, last was one week ago, and he underwent upper endoscopy  last 9/5. Has loss of appetite due to ascites, feels temporarily after paracentesis.  He requires twice weekly paracenteses, and due to some reduction in renal function, he now has a fluid limit removal of 6 L.  He does immediately feel better following paracentesis, but the fluid quickly reaccumulate's, thus benefit is short-term.  He does have a history of spontaneous bacterial peritonitis.  He perceives that his quality of life and appetite would both significantly improve if we resolved his ascites.      He also has had HTN since teens, controlled on single medication. He has a strong family history of HTN, including his father.      He was diagnosed with asthma as a youngster, but it has been very well controlled.  His last significant respiratory symptoms were approximately 2 years ago.  He was previously prescribed " Advair, but has not needed this for quite some time.  He has never had to use a rescue inhaler.  He has never been hospitalized for his asthma.    No history of sleep apnea.    No fever, cough, dyspnea, palpitations, chest pain, mucosal or urinary bleeding, or lower extremity swelling.    PAST MEDICAL HISTORY:   Past Medical History:   Diagnosis Date     Diabetes (H)      History of blood transfusion      Hypertension        PAST SURGICAL HISTORY:   No past surgical history on file.    FAMILY HISTORY:   No family history on file.    SOCIAL HISTORY:   Social History     Tobacco Use     Smoking status: Former Smoker     Smokeless tobacco: Never Used     Tobacco comment: [employer]   Substance Use Topics     Alcohol use: Not Currently     Comment: Quit ETOH 10/2020       PROBLEM LIST:   Patient Active Problem List    Diagnosis Date Noted     Acute blood loss anemia 09/04/2021     Priority: Medium     Acute upper GI bleeding 09/04/2021     Priority: Medium     Coagulopathy (H) 09/04/2021     Priority: Medium     Chronic hyponatremia 08/27/2021     Priority: Medium     SBP (spontaneous bacterial peritonitis) (H) 08/27/2021     Priority: Medium     DELLA (acute kidney injury) (H) 05/06/2021     Priority: Medium     Alcoholic cirrhosis of liver with ascites (H) 10/24/2020     Priority: Medium     Drug-induced erectile dysfunction 01/08/2018     Priority: Medium     Mild intermittent asthma without complication 01/08/2018     Priority: Medium     Obesity (BMI 30-39.9) 01/08/2018     Priority: Medium     Type 2 diabetes mellitus without complication, without long-term current use of insulin (H) 01/08/2018     Priority: Medium     Hyperlipidemia 01/08/2018     Priority: Medium     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  Atypical depression, with inattentiveness (HCC)       HTN (hypertension) 12/09/2014     Priority: Medium       MEDICATIONS:    Prescription Medications as of 9/15/2021       Rx Number Disp Refills Start End Last Dispensed Date Next Fill Date Owning Pharmacy    ciprofloxacin (CIPRO) 500 MG tablet            Sig: Take 500 mg by mouth 2 times daily     Class: Historical    Route: Oral    ferrous sulfate (FEROSUL) 325 (65 Fe) MG tablet            Sig: Take 325 mg by mouth daily (with breakfast)    Class: Historical    Route: Oral    fish oil-omega-3 fatty acids 1000 MG capsule            Sig: Take 2 g by mouth daily    Class: Historical    Route: Oral    magnesium 250 MG tablet            Sig: Take 1 tablet by mouth daily    Class: Historical    Route: Oral    vitamin D3 (CHOLECALCIFEROL) 250 mcg (71682 units) capsule            Sig: Take 1 capsule by mouth daily    Class: Historical    Route: Oral    amoxicillin (AMOXIL) 500 MG tablet            Sig: Take 500 mg by mouth 2 times daily    Class: Historical    Route: Oral    amoxicillin-clavulanate (AUGMENTIN) 875-125 MG tablet            Sig: Take 1 tablet by mouth 2 times daily    Class: Historical    Route: Oral    furosemide (LASIX) 40 MG tablet    10/27/2020 10/27/2021       Sig: Take 40 mg by mouth daily     Class: Historical    Route: Oral          ALLERGIES:   Ketorolac tromethamine    ROS:  Skin: negative  Eyes: negative  Ears/Nose/Throat: epistaxis, minor episodes he thinks is due to dry mucosa. He has eustacian tueb dysfuntion that causes alteration in hearing.  Respiratory: No shortness of breath, dyspnea on exertion, cough, or hemoptysis  Cardiovascular: negative  Gastrointestinal: as above  Genitourinary: small UOP due to dehydration, better since stopping lasix  Musculoskeletal: negative, prior back surgery approx 8 years ago  Neurologic: occasional tremblig  Psychiatric: hard time sleeping since diagnosis  Hematologic/Lymphatic/Immunologic: negative  Endocrine: negative      Physical Examination:   VITALS:   /78   Pulse 103   Temp 99.4  F (37.4  C)   Resp 16   Ht  1.829 m (6')   Wt 105.2 kg (232 lb)   SpO2 99%   BMI 31.46 kg/m    Constitutional: alert and no distress  Head: Normocephalic. No scleral icterus  ENT: Mal 1, OP clear  Cardiovascular: RRR. No murmur  Respiratory: Normal respiratory effort, CTAB  Gastrointestinal: Distended  Skin: No jaundice  Neurologic: No asterixis  Psychiatric: affect normal/bright and mentation appears normal.    Labs:    BMP RESULTS:  Lab Results   Component Value Date     (L) 09/10/2021    POTASSIUM 4.7 09/10/2021    CHLORIDE 100 09/10/2021    CO2 17 (L) 09/10/2021    ANIONGAP 10 09/10/2021    GLC 69 (L) 09/10/2021    BUN 11 09/10/2021    CR 1.50 (H) 09/10/2021    GFRESTIMATED 56 (L) 09/10/2021    SANJANA 8.1 (L) 09/10/2021        CBC RESULTS:  Lab Results   Component Value Date    WBC 8.3 09/10/2021    RBC 2.64 (L) 09/10/2021    HGB 7.9 (LL) 09/10/2021    HCT 24.3 (L) 09/10/2021    MCV 92 09/10/2021    MCH 29.9 09/10/2021    MCHC 32.5 09/10/2021    RDW 14.4 09/10/2021     09/10/2021       INR/PTT:  Lab Results   Component Value Date    INR 2.18 (H) 09/10/2021       Diagnostic studies:   I personally reviewed the imaging.    Liver Doppler ultrasound performed today demonstrates patent portal venous system, hepatic arterial system, and hepatic venous system.  The splenic vein is patent midline with antegrade flow.    Radiologist impression:  Impression:   1. Cirrhotic morphology of the liver with sequela of portal  hypertension including large volume abdominal ascites and  splenomegaly. No suspicious hepatic lesions.  2. Normal Doppler evaluation of the hepatic vasculature with antegrade  flow demonstrated throughout the portal system.     I have personally reviewed the examination and initial interpretation  and I agree with the findings.     KAEL MELARA MD        CT performed at [facility] on 8/27/2021 also reviewed by me.  It demonstrates patent inferior mesenteric, superior mesenteric, splenic, and portal veins.   There is large volume ascites.  There is a small umbilical hernia containing ascites.  Radiologist impression:  IMPRESSION:     1. Heterogeneous and cirrhotic appearing liver with portosystemic collateralization and mild splenomegaly.   2. Large volume ascites.   3. Mild wall thickening of multiple small bowel loops and portions of the colon likely reflects portal enteropathy and colopathy, though other types of enteritis and colitis are not excluded.   4. Noninflamed umbilical hernia containing ascites.      Assessment: 41-year-old male with alcohol induced cirrhosis (MELD 20, Child Womack B), with sequelae of portal hypertension including GI bleeding and refractory, lifestyle limiting ascites. His high Na MELD is driven by his high sodium. Standard MELD is 20, which is borderline, but within acceptable limits by recent standards for TIPS placement.  Also, considering history of significant GI bleeding and severe ascites with history of SBP, he has 2 indications for TIPS placement.  His his evaluation is pending his cardiac ECHO, and he is scheduled for this Friday.  I discussed the TIPS procedure, which will be done under general anesthesia.  I discussed the risks of intraprocedural bleeding, technical failure, vascular or bile duct injury, stent misdeployment, infection, acute worsening of liver disease, refractory ascites and encephalopathy. Given his significant GI bleeding, TIPS is certainly indicated despite borderline MELD.  All questions were answered.  He would like to proceed.      Plan:  1. Cardiac ECHO pending  2. If TIPS is able to be pursued, he will need a paracentesis done 1-2 days prior to TIPS and on day of TIPS. The fluid removal limit is 6 L based on hepatology recommendations given his reduced renal function.    It was a pleasure to meet with Mr. Nowak in clinic today.  Thank you for including the interventional radiology service in his care.    I spent a total of 40 minutes on today's in  person visit, over 50% time was for counseling and care coordination.  In addition, on the day of visit I spent 10 minutes reviewing imaging and 10 minutes completing documentation.    Hannah Parry MD  Interventional Radiology   Pager [phone]      CC  Patient Care Team:  Perez Salcido as PCP - General (Internal Medicine)  Cadence Murray MD as Assigned Gastroenterology Provider  Gege Flanagan RN as Specialty Care Coordinator  Douglas Manzano Jr. RN as Nurse Coordinator (Transplant)  Christina Fabian MD as Referring Physician (Gastroenterology)  SELFREFERRAL, SCREENING MAMMOGRAM

## 2021-09-15 NOTE — PATIENT INSTRUCTIONS
We will call you with the appointment if your TIPS PLACEMENT.    On the day of the appointment, you will check  in 2 hours early to your scheduled procedure.     Please check into the 3rd floor, Pre-Op , located  at [facility], located at [address] [place] [address].     *please note that you will be given General Anesthesia sedation for this procedure.       Reminders:    -You will need to have a Preop for this procedure, within 30 days.     -You will also need to get Covid testing 4 days prior to your procedure. Please read below for more information.     -Nothing to eat or drink after midnight the night before.     -Please take your morning medications as indicated with enough water to swallow.     -Please also note that you will be going home, so therefore make sure that you have a .     *We ask that you continue to take your medications and attend Paracentesis appointments as scheduled. We do not want you to discontinue your medications, especially if it is related to your liver disease.     Once the TIPS is placed, it will take a few weeks for it to function properly, so therefore attending Paracentesis appointments is important as you will notice that over time there will be less fluid removed.     **Should you experience any of the symptoms below please let us know.     1. Confusion- Hepatic encephalopathy. This is a dangerous symptom that can happen after the TIPS procedure. Please go immediately to the Emergency room    2. Swelling of lower legs- please notify us as soon as possible. This can happen afterwards as well. Please make sure to connect with your Hepatologist/Liver doctor for any diuretics.     3. Fever and abd pain-please call me as this may indicate an infection or so.      We will call you 2-3 days after you go home.     Follow up: We will see you at 1, 3 ,6, 12 months after TIPS placement for proper follow up with an Ultrasound to evaluate if the  TIPS is still patent. Otherwise your Hepatologist will follow up with you.       Should you have any further questions, please call us anytime. It has been a pleasure to see you today.      **Please note:      Visiting hours are 8 a.m. to 8:30 p.m.      Arrive wearing a mask over your mouth and nose.  Keep it on during your visit. If you don t wear a  mask, we ll ask you to leave.    Clean your hands with alcohol hand . Do  this when you arrive at and leave the building and  patient room, and again after you touch your mask  or anything in the room.    We will ask screening questions to check if you are  healthy. You can t visit if you have a fever, cough,  shortness of breath, muscle aches, headaches,  sore throat or diarrhea (loose, watery stools).    Stay 6 feet away from others during your visit and  between visits.    Go directly to and from the room you are visiting.    Stay in the patient s room during your visit. Limit  going to other places in the hospital as much as  you can.    Leave bags and jackets at home or in the car.    For everyone s health, please don t come and go  during your visit. That includes for smoking.      No visitors under age 18 are allowed for adult  hospital patients. Children may have visitors ages  12 and older.    Adult Patients:  Two visitors are allowed at a time for hospital, long-term acute care hospital (LTACH), transitional care unit, antepartum, and acute rehabilitation unit patients staying in a private room.   One visitor is allowed for patients staying in a semi-private room.   Every four days, the visitor(s) can rotate (during the four-day period, the visitor(s) must be consistent).  One visitor is allowed for clinic appointments and emergency department visits. The one visitor may rotate as needed.  Adult surgical patients can have one visitor wait in the surgical waiting lounge if there is adequate space for social distancing. No changes for pediatric  patients (children can have visitors throughout the surgery process).        Children under 18:  Two visitors are allowed for hospital visits, clinic appointments, emergency department visits, and surgeries or procedures. The individuals may rotate daily.  Visitors ages 12 and older are allowed.  Family pets can visit (must follow any applicable pet policies).      COVID TESTING      *it is now expected that ALL patients will have a Covid 19 testing 4 days before their procedure. There is a Covid 19 Team that will be contacting you with more information when it gets closer to your procedure.     **If you do not hear from this team within a week before your scheduled procedure please call their scheduling line at [phone].    *If you decide to have Covid testing outside of the [facility] system please make sure that it is completed 4 days prior to procedure and that all results are faxed directly to:    Attn: Interventional Radiology  Fax: [phone].      *WE DO NOT ACCEPT ANY ORAL SWAB OR SPUTUM COVID TESTING. THE TESTING MUST BE A NASAL SWAB.                Cadence ALFREDO RN, BSN  Interventional Radiology Nurse Coordinator       Covering for Mikaela Tomas RN  [phone]

## 2021-09-16 ENCOUNTER — TRANSFERRED RECORDS (OUTPATIENT)
Dept: HEALTH INFORMATION MANAGEMENT | Facility: CLINIC | Age: 41
End: 2021-09-16

## 2021-09-17 ENCOUNTER — HOSPITAL ENCOUNTER (INPATIENT)
Facility: CLINIC | Age: 41
Setting detail: SURGERY ADMIT
End: 2021-09-17
Payer: COMMERCIAL

## 2021-09-17 ENCOUNTER — ANCILLARY PROCEDURE (OUTPATIENT)
Dept: CARDIOLOGY | Facility: CLINIC | Age: 41
End: 2021-09-17
Attending: STUDENT IN AN ORGANIZED HEALTH CARE EDUCATION/TRAINING PROGRAM
Payer: COMMERCIAL

## 2021-09-17 ENCOUNTER — TELEPHONE (OUTPATIENT)
Dept: RADIOLOGY | Facility: CLINIC | Age: 41
End: 2021-09-17

## 2021-09-17 DIAGNOSIS — K70.31 ALCOHOLIC CIRRHOSIS OF LIVER WITH ASCITES (H): ICD-10-CM

## 2021-09-17 LAB — LVEF ECHO: NORMAL

## 2021-09-17 NOTE — TELEPHONE ENCOUNTER
I called and left a voicemail including my call back number.  I called to let him know I have him set up for his TIPS procedure and will send a TAKO message.  TERRY Tomas RN, BSN  Interventional Radiology Nurse Coordinator   Phone:  [phone]

## 2021-09-17 NOTE — PROGRESS NOTES
Pt had repeat labs on 9/10 and, per Dr. Murray, pt should discontinue lasix 40 mg daily. Called pt to check in and review symptoms. Pt confirmed that he stopped lasix. Denied increase in lower extremity edema but reported moderate amount of ascites. Pt had paracentesis on 9/13 and had 6 liters removed and 5.9 liters removed at a paracentesis on 9/16. Pt feels that umbilical hernia is getting bigger and discussed pt getting an abdominal binder to help with the hernia pain.     Pt is losing weight and reported no appetite. Pt is trying to eat small frequent meals and has started drinking ensure. Instructed pt to continue with drinking ensures and try to increase protein intake.     Pt had TIPS consult on 9/15 and TIPS scheduled for 11/8. Pt has an Echo today, 9/17, and EGD scheduled for 10/1 at [facility]. Plan to check in with pt in 2 weeks.

## 2021-09-18 ENCOUNTER — TELEPHONE (OUTPATIENT)
Dept: GASTROENTEROLOGY | Facility: CLINIC | Age: 41
End: 2021-09-18

## 2021-09-18 DIAGNOSIS — R11.2 NON-INTRACTABLE VOMITING WITH NAUSEA, UNSPECIFIED VOMITING TYPE: Primary | ICD-10-CM

## 2021-09-18 RX ORDER — ONDANSETRON 4 MG/1
4 TABLET, FILM COATED ORAL EVERY 8 HOURS PRN
Qty: 20 TABLET | Refills: 0 | Status: SHIPPED | OUTPATIENT
Start: 2021-09-18 | End: 2022-11-29

## 2021-09-18 RX ORDER — ONDANSETRON 4 MG/1
4 TABLET, FILM COATED ORAL EVERY 8 HOURS PRN
Qty: 20 TABLET | Refills: 0 | Status: SHIPPED | OUTPATIENT
Start: 2021-09-18 | End: 2021-09-18

## 2021-09-18 NOTE — TELEPHONE ENCOUNTER
Called by patient regarding not feeling well.    42 yo M with alcohol related cirrhosis w/ history of EV bleeding s/p banded 6/2021, plan for another EGD next week. Stopped drinking for over a year.    Feeling nauseated and dizzy. No fever. No vomiting blood. Has black stool for the past few days (taking iron), 2-3 bowel movements a day formed stool which is different from loose stool when he had bleeding priory. No fever. Drinking ok, but unable to eat much.    BP at home:100/60, and 98/61, /min (not more tachycardic than baseline per patient).    # Alcohol related cirrhosis  # Nausea vomiting    Plan:   - will order zofran for patient to   - If not improve by tomorrow or worsening -> patient will come to ED.    Veronika Otero MD  Gastroenterology/Hepatology Fellow  Page: [phone]

## 2021-09-19 ENCOUNTER — HEALTH MAINTENANCE LETTER (OUTPATIENT)
Age: 41
End: 2021-09-19

## 2021-09-19 DIAGNOSIS — Z11.59 ENCOUNTER FOR SCREENING FOR OTHER VIRAL DISEASES: ICD-10-CM

## 2021-09-20 NOTE — TELEPHONE ENCOUNTER
Left message on both lines asking for a call back today if able,or will be contacted by Pasquale Txp Coordinator as scheduled.     Patient called back and is inpatient at [facility] with GIB. Will communicate to teams regarding collaboration and possible transfer.  EGD done yeterday:  Recently bleeding grade II                        esophageal varices and type 1 GOV.                        Multiple potential bleeding sites                        with large fibrin deposits likely                        overlying previous banding sites.                        Banding not pursued given risks of                        stimulating further severe hemorrhage                        without having rescue TIPS available                        on site.                        - A medium sized dark blood clot in                        the gastric fundus and in the gastric                        body.                        - Portal hypertensive gastropathy.       PCP/IM, Perez Salcido MD      42 yo with decompensated cirrhosis,ETOH (10/2020 quit on his own, did work with clinical psychologist for several months)) complicated with EV/GIB, SBP (sepsis),refractory ascites/hyponatrimia,DELLA.    PMH: pt denies asthma, DM2 and no longer has issues with hyperlipemia or HTN, lost wt with resolution of many health issues,,Noninflamed umbilical hernia,depession,    Surgery:back/microdiscectomy     EGD:6/2021 grade 2 EV,banded, Surveillance due now.     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 Reviewed txp evaluation and if not done inpt, will complete as outpt. Information on my transplant place forwarded. Patient does use my chart. Team may want CTA, deferring at this time.

## 2021-09-20 NOTE — PROGRESS NOTES
Received a call from pt stating that he is currently admitted at [facility] for GI bleed. Pt had EGD on 9/19 and inpatient team is attempting to transfer pt for an emergent TIPS. Dr. Murray notified. Will continue to follow plan of care.

## 2021-09-20 NOTE — PROGRESS NOTES
September 20, 2021   I was called by Deyanira Harmon from the Department of gastroenterology at [facility] about the patient Florian Nowak who I'm told has KANG/alcohol related cirrhosis with multiple complications of portal hypertension    The following specific question and concerns;  - 41y M with cirrhosis, refratory ascites. In eval for TIPS  - Now admitted with variceal hemorrhage. Banding in Early september and repeat EGD 9/19 with fibrin clots on previously banded areas.  Call to discuss transfer for TIPS    My opinion as follows;  - While this would likely be ideal, based on bed census and HIGH DEMAND on health system in the setting of COVID pandemic, our hospital is unable to accept the patient in transfer at this time.    At the time of our discussion over the phone I was unable to see and personally evaluate the patient.  I was able to access to the patient s medical records/chart and reviewed the chart related to the specific question.    I made OWLF Harmon NP aware that I cannot provide direct medical advice or consultation, but could provide a general opinion for his/her consideration as the in-person treating provider.      My conversation with Dr. WOLF Harmon NP is not meant to replace or supersede the clinical judgement of the in-person treating provider.    Thomas M. Leventhal, M.D.   of Medicine  Advanced/Transplant Hepatology & Critical Care Medicine  [facility]  September 20, 2021

## 2021-09-20 NOTE — PROGRESS NOTES
TIPS moved up to 10/12. Per Dr. Murray, pt should still have EGD scheduled for 10/1. Attempted to reach patient to relay message to keep EGD, no answer, message left requesting call back, number provided.

## 2021-09-21 DIAGNOSIS — Z11.59 ENCOUNTER FOR SCREENING FOR OTHER VIRAL DISEASES: ICD-10-CM

## 2021-09-22 NOTE — PROGRESS NOTES
Pt transferred to [facility] on 9/21 and is s/p TIPS. Pt stated that he is currently on an octreotide drip and is hoping to discharge in 1-2 days. Reviewed that pt no longer needs EGD scheduled on 10/1 and TIPS scheduled for 10/12 is now cancelled. Pt should continue to schedule paracentesis until pt no longer has ascites. Plan to check in with pt once discharged. Pt verbalized understanding and is agreeable to plan of care.

## 2021-09-24 ENCOUNTER — HOSPITAL ENCOUNTER (EMERGENCY)
Facility: CLINIC | Age: 41
Discharge: HOME | End: 2021-09-24
Attending: EMERGENCY MEDICINE | Admitting: EMERGENCY MEDICINE
Payer: COMMERCIAL

## 2021-09-24 VITALS
OXYGEN SATURATION: 100 % | BODY MASS INDEX: 28.44 KG/M2 | RESPIRATION RATE: 18 BRPM | HEIGHT: 72 IN | SYSTOLIC BLOOD PRESSURE: 96 MMHG | DIASTOLIC BLOOD PRESSURE: 40 MMHG | WEIGHT: 210 LBS | HEART RATE: 77 BPM | TEMPERATURE: 98.1 F

## 2021-09-24 DIAGNOSIS — K42.0 INCARCERATED UMBILICAL HERNIA: ICD-10-CM

## 2021-09-24 LAB
ALBUMIN SERPL-MCNC: 2.9 G/DL (ref 3.4–5)
ALP SERPL-CCNC: 120 U/L (ref 40–150)
ALT SERPL W P-5'-P-CCNC: 87 U/L (ref 0–70)
ANION GAP SERPL CALCULATED.3IONS-SCNC: 7 MMOL/L (ref 3–14)
AST SERPL W P-5'-P-CCNC: 64 U/L (ref 0–45)
BILIRUB SERPL-MCNC: 1 MG/DL (ref 0.2–1.3)
BUN SERPL-MCNC: 24 MG/DL (ref 7–30)
CALCIUM SERPL-MCNC: 8.4 MG/DL (ref 8.5–10.1)
CHLORIDE BLD-SCNC: 109 MMOL/L (ref 94–109)
CO2 SERPL-SCNC: 19 MMOL/L (ref 20–32)
CREAT SERPL-MCNC: 0.89 MG/DL (ref 0.66–1.25)
GFR SERPL CREATININE-BSD FRML MDRD: >90 ML/MIN/1.73M2
GLUCOSE BLD-MCNC: 101 MG/DL (ref 70–99)
HGB BLD-MCNC: 8.2 G/DL (ref 13.3–17.7)
HOLD SPECIMEN: NORMAL
POTASSIUM BLD-SCNC: 4.5 MMOL/L (ref 3.4–5.3)
PROT SERPL-MCNC: 6.1 G/DL (ref 6.8–8.8)
SODIUM SERPL-SCNC: 135 MMOL/L (ref 133–144)

## 2021-09-24 PROCEDURE — 36415 COLL VENOUS BLD VENIPUNCTURE: CPT | Performed by: EMERGENCY MEDICINE

## 2021-09-24 PROCEDURE — 250N000011 HC RX IP 250 OP 636: Performed by: EMERGENCY MEDICINE

## 2021-09-24 PROCEDURE — 85018 HEMOGLOBIN: CPT | Performed by: EMERGENCY MEDICINE

## 2021-09-24 PROCEDURE — 99285 EMERGENCY DEPT VISIT HI MDM: CPT | Mod: 25

## 2021-09-24 PROCEDURE — 96374 THER/PROPH/DIAG INJ IV PUSH: CPT

## 2021-09-24 PROCEDURE — 80053 COMPREHEN METABOLIC PANEL: CPT | Performed by: EMERGENCY MEDICINE

## 2021-09-24 RX ORDER — FENTANYL CITRATE 50 UG/ML
100 INJECTION, SOLUTION INTRAMUSCULAR; INTRAVENOUS ONCE
Status: COMPLETED | OUTPATIENT
Start: 2021-09-24 | End: 2021-09-24

## 2021-09-24 RX ADMIN — FENTANYL CITRATE 100 MCG: 50 INJECTION, SOLUTION INTRAMUSCULAR; INTRAVENOUS at 07:46

## 2021-09-24 RX ADMIN — FENTANYL CITRATE 100 MCG: 50 INJECTION, SOLUTION INTRAMUSCULAR; INTRAVENOUS at 07:29

## 2021-09-24 ASSESSMENT — ENCOUNTER SYMPTOMS
ABDOMINAL PAIN: 1
SHORTNESS OF BREATH: 0
DIFFICULTY URINATING: 0
CONSTIPATION: 1

## 2021-09-24 ASSESSMENT — MIFFLIN-ST. JEOR: SCORE: 1885.55

## 2021-09-24 NOTE — ED TRIAGE NOTES
Patient was discharged yesterday from [facility] after an emergency TIPS procedure, today he presents with complaints of pain at the site of an umbilical hernia. Patient stated the hernia became larger and very hard about an hour ago

## 2021-09-24 NOTE — ED PROVIDER NOTES
History     Chief Complaint:  Hernia      HPI   Florian Nowak is a 41 year old male with a history of type 2 diabetes, hypertension, hyperlipidemia, alcoholic cirrhosis, GI bleed, asthma and depression who presents with a hernia. The patient had an emergency TIPS procedure done two days ago, and this morning he woke up with an abdominal pain surrounding a known umbilical hernia. He reports that he can normally push it back in but this is not the case today. He also reports not being able to pass gas or have bowel movements since 9/19 but states that this is normal for him after a procedure. He states that he is urinating well and denies any shortness of breath.     Review of Systems   Respiratory: Negative for shortness of breath.    Gastrointestinal: Positive for abdominal pain and constipation.   Genitourinary: Negative for difficulty urinating.   All other systems reviewed and are negative.      Allergies:  Ketorolac Tromethamine    Medications:    The patient is not currently taking any prescribed medications.    Past Medical History:    Type II Diabetes  SBP  DELLA  Alcoholic cirrhosis  Obesity  Hyperlipidemia  Depression  Hypertension  GI bleed   Asthma      Past Surgical History:    TIPS procedure    Family History:    The patient denies past family history.    Social History:  Patient presents alone    Physical Exam     Patient Vitals for the past 24 hrs:   BP Temp Temp src Pulse Resp SpO2 Height Weight   09/24/21 0655 96/40 98.1  F (36.7  C) Temporal 77 18 100 % 1.829 m (6') 95.3 kg (210 lb)       Physical Exam  Eye:  Pupils are equal, round, and reactive.  Extraocular movements intact.    ENT:  No rhinorrhea.  Moist mucus membranes.  Normal tongue and tonsil.    Cardiac:  Regular rate and rhythm.  No murmurs, gallops, or rubs.    Pulmonary:  Clear to auscultation bilaterally.  No wheezes, rales, or rhonchi.    Abdomen:  Positive bowel sounds.  Abdomen is soft and non-distended, without focal  tenderness. Large, incarcerated umbilical hernia, tender to palpation without discoloration.    Musculoskeletal:  Normal movement of all extremities without evidence for deficit.    Skin:  Warm and dry without rashes.    Neurologic:  Non-focal exam without asymmetric weakness or numbness.     Psychiatric:  Normal affect with appropriate interaction with examiner.    Emergency Department Course     Laboratory:  CMP: CO2 19 (L), calcium 8.4 (L), glucose 101 (H), AST 64 (H), ALT 87 (H), protein total 6.1 (L), albumin 2.9 (L), AWNL (Creatinine: 0.89)    Hemoglobin: 8.2 (L)     Procedures:  Procedure Note - Bedside Hernia Reduction  Performed by: Trierweiler, Chad A, MD  After verbal consent was obtained, the patient was given analgesics and placed in Trendelenburg position.  I then applied steady direct manual pressure to the patient's tender umbilical hernia, and was able to successfully and fully reduce the hernia with subsequent symptomatic improvement.  The patient tolerated the procedure relatively well and there were no apparent complications.    Emergency Department Course:    Reviewed:  I reviewed nursing notes, vitals, past history and care everywhere    Assessments:  0705 I obtained history and examined the patient as noted above.     0737 I returned to check on patient.  I attempted to push patient's hernia back into place.     0800 I returned to check on patient.  I pushed the patient's hernia back into place.     0829 I returned to check on patient.  I answered all questions prior to discharge.     Interventions:  0729 Fentanyl 100 mcg IV     0746 Fentanyl 100 mcg IV     Disposition:  The patient was discharged to home.    Impression & Plan        Medical Decision Making:  This unfortunate 41-year-old man who is approximately 48 hours status post TIPS procedure for his alcoholic cirrhosis presents to us with an incarcerated umbilical hernia. He notes that he has had this hernia for several years due to  ongoing ascites. However, he feels as though he is diuresing very well since his procedure. He felt his typical outward bulge around his umbilicus, though it then became painful this morning and he was unable to reduce it.    On exam, there is an incarcerated hernia here. After receiving some pain medication and laid flat, I was able to reduce this. Patient had complete and total resolution of all symptoms and is safe for discharge home. I explained that this may be an ongoing problem and he may require surgery. We discussed the importance of consistently reducing this immediately if it pops out. Otherwise, he will return to us for any worsening of condition or other emergent concerns.    Diagnosis:    ICD-10-CM    1. Incarcerated umbilical hernia  K42.0     REDUCED     Scribe Disclosure:  IAshley, am serving as a scribe at 7:03 AM on 9/24/2021 to document services personally performed by Trierweiler, Chad A, MD based on my observations and the provider's statements to me.     Scribe Disclosure:  Tessie SMITH, am serving as a scribe at 7:38 AM on 9/24/2021 to document services personally performed by Trierweiler, Chad A, MD based on my observations and the provider's statements to me.       Trierweiler, Chad A, MD  09/25/21 0680

## 2021-09-28 NOTE — TELEPHONE ENCOUNTER
Pt discharged from [facility] on 9/23. Per chart review, pt reported to emergency room on 9/24 with incarcerated umbilical hernia. Pt was given pain medication and ER physician was able to reduce hernia.       Called pt for check in. Pt stated that he feels better overall and has more energy since TIPS procedure.     Reviewed fluid accumulation. Pt reported unilateral swelling in right ankle. Pt is not currently on diuretics. Pt stated that swelling is manageable at this time and plans to elevate lower extremities and follow sodium restricted diet. Instructed pt to continue to monitor edema and notify writer if swelling worsens. Pt reported minimal ascites and has a paracentesis appointment scheduled for 9/30.     Pt stated that he is having to reduce umbilical hernia 3-4 times/day and is having a significant amount of discomfort at site of hernia. Pt is wearing an abdominal binder which he stated is helping. Pt asked about surgical intervention for hernia and stated that he can't keep reducing the hernia multiple times per day. Notified pt that writer would discuss hernia with Dr. Murray and follow up with any recommendations or changes to plan of care.     Plan to check in with pt in 1 week. Pt verbalized understanding and is agreeable to plan of care.

## 2021-10-05 ENCOUNTER — PATIENT OUTREACH (OUTPATIENT)
Dept: GASTROENTEROLOGY | Facility: CLINIC | Age: 41
End: 2021-10-05

## 2021-10-05 DIAGNOSIS — K70.31 ALCOHOLIC CIRRHOSIS OF LIVER WITH ASCITES (H): Primary | ICD-10-CM

## 2021-10-07 NOTE — PROGRESS NOTES
Pt reported increased swelling in bilateral ankles since TIPS procedure. Pt stated that he is working during the day and cannot elevate his legs as instructed. Encouraged pt to elevate lower extremities as much as possible and adhere to 2,000 mg sodium restriction. Pt's last paracentesis on 9/30 and has next appointment scheduled on 10/12. Pt reported that the ascites is accumulating more slowly post TIPS. Pt's main complaint is his umbilical hernia. Pt is wearing abdominal binder at all times and is having more pain at site.     Plan to review pt's symptoms with Dr. Murray and follow up with pt. Pt is also due for hepatology follow up and writer will assist pt with getting this scheduled. Pt verbalized understanding and is agreeable to plan of care.

## 2021-10-11 ENCOUNTER — TELEPHONE (OUTPATIENT)
Dept: GASTROENTEROLOGY | Facility: CLINIC | Age: 41
End: 2021-10-11

## 2021-10-11 RX ORDER — FUROSEMIDE 40 MG
40 TABLET ORAL DAILY
Qty: 90 TABLET | Refills: 3 | COMMUNITY
Start: 2021-10-11 | End: 2021-10-19

## 2021-10-11 NOTE — PROGRESS NOTES
Per Dr. Murray, pt can start lasix 40 mg daily for increased lower extremity edema and should repeat labs in 1 week. Called pt to review recommendations and pt agreeable. Pt will plan to start lasix and get labs at [facility] in 1 week.

## 2021-10-18 ENCOUNTER — LAB (OUTPATIENT)
Dept: LAB | Facility: CLINIC | Age: 41
End: 2021-10-18
Payer: COMMERCIAL

## 2021-10-18 ENCOUNTER — OFFICE VISIT (OUTPATIENT)
Dept: GASTROENTEROLOGY | Facility: CLINIC | Age: 41
End: 2021-10-18
Attending: STUDENT IN AN ORGANIZED HEALTH CARE EDUCATION/TRAINING PROGRAM
Payer: COMMERCIAL

## 2021-10-18 VITALS
DIASTOLIC BLOOD PRESSURE: 71 MMHG | SYSTOLIC BLOOD PRESSURE: 110 MMHG | WEIGHT: 252.8 LBS | BODY MASS INDEX: 34.29 KG/M2 | OXYGEN SATURATION: 100 % | HEART RATE: 88 BPM

## 2021-10-18 DIAGNOSIS — K70.31 ALCOHOLIC CIRRHOSIS OF LIVER WITH ASCITES (H): ICD-10-CM

## 2021-10-18 DIAGNOSIS — K65.2 SBP (SPONTANEOUS BACTERIAL PERITONITIS) (H): Primary | ICD-10-CM

## 2021-10-18 DIAGNOSIS — K70.31 ALCOHOLIC CIRRHOSIS OF LIVER WITH ASCITES (H): Primary | ICD-10-CM

## 2021-10-18 DIAGNOSIS — F10.21 ALCOHOL USE DISORDER, SEVERE, IN EARLY REMISSION (H): ICD-10-CM

## 2021-10-18 DIAGNOSIS — Z95.828 S/P TIPS (TRANSJUGULAR INTRAHEPATIC PORTOSYSTEMIC SHUNT): ICD-10-CM

## 2021-10-18 LAB
ALBUMIN SERPL-MCNC: 2.2 G/DL (ref 3.4–5)
ANION GAP SERPL CALCULATED.3IONS-SCNC: 9 MMOL/L (ref 3–14)
BILIRUB SERPL-MCNC: 1.4 MG/DL (ref 0.2–1.3)
BUN SERPL-MCNC: 11 MG/DL (ref 7–30)
CALCIUM SERPL-MCNC: 8.2 MG/DL (ref 8.5–10.1)
CHLORIDE BLD-SCNC: 111 MMOL/L (ref 94–109)
CO2 SERPL-SCNC: 23 MMOL/L (ref 20–32)
CREAT SERPL-MCNC: 0.75 MG/DL (ref 0.66–1.25)
GFR SERPL CREATININE-BSD FRML MDRD: >90 ML/MIN/1.73M2
GLUCOSE BLD-MCNC: 110 MG/DL (ref 70–99)
INR PPP: 1.35 (ref 0.85–1.15)
POTASSIUM BLD-SCNC: 3.4 MMOL/L (ref 3.4–5.3)
SODIUM SERPL-SCNC: 143 MMOL/L (ref 133–144)

## 2021-10-18 PROCEDURE — 82040 ASSAY OF SERUM ALBUMIN: CPT | Performed by: PATHOLOGY

## 2021-10-18 PROCEDURE — 99215 OFFICE O/P EST HI 40 MIN: CPT | Performed by: STUDENT IN AN ORGANIZED HEALTH CARE EDUCATION/TRAINING PROGRAM

## 2021-10-18 PROCEDURE — 80048 BASIC METABOLIC PNL TOTAL CA: CPT | Performed by: PATHOLOGY

## 2021-10-18 PROCEDURE — 82247 BILIRUBIN TOTAL: CPT | Performed by: PATHOLOGY

## 2021-10-18 PROCEDURE — 36415 COLL VENOUS BLD VENIPUNCTURE: CPT | Performed by: PATHOLOGY

## 2021-10-18 PROCEDURE — 85610 PROTHROMBIN TIME: CPT | Performed by: PATHOLOGY

## 2021-10-18 RX ORDER — FUROSEMIDE 40 MG
40 TABLET ORAL 2 TIMES DAILY
Qty: 60 TABLET | Refills: 3 | Status: SHIPPED | OUTPATIENT
Start: 2021-10-18 | End: 2021-10-27

## 2021-10-18 RX ORDER — SPIRONOLACTONE 50 MG/1
50 TABLET, FILM COATED ORAL DAILY
Qty: 30 TABLET | Refills: 3 | Status: SHIPPED | OUTPATIENT
Start: 2021-10-18 | End: 2021-10-27

## 2021-10-18 RX ORDER — CIPROFLOXACIN 500 MG/1
500 TABLET, FILM COATED ORAL DAILY
Qty: 90 TABLET | Refills: 3 | Status: SHIPPED | OUTPATIENT
Start: 2021-10-18 | End: 2022-09-13

## 2021-10-18 ASSESSMENT — PAIN SCALES - GENERAL: PAINLEVEL: MILD PAIN (3)

## 2021-10-18 NOTE — PROGRESS NOTES
[facility] Return Patient Visit    Date of Visit: September 8, 2021    Reason for referral: Alcohol related liver disease    Subjective: Mr. Nowak is a 41 year old man with a history of AUD, alcohol related cirrhosis, who presents for follow up of his decompensated cirrhosis    Initial History:     He was told he had KANG in his 30s while living in [place]. Was around 300 lbs then. At that time was drinking 5 drinks 5 days a week. Ferritin was elevated, did not get HC testing.     He reports he would drink 5 drinks 5 days a week roughly. He would have some periods of sobriety for a few months when trying to quit. Spring 2020 cut back to 2 glasses of wine a day. Was not feeling well, stopping drinking in October and then suddenly got bloated. Presented to urgent care 10/2020 found to have new ascites. BR was 5 at that time.     First few months could go 2-4 weeks between paracentesis, had to increase frequency. In May K noted to be high with DELLA (creatinine 3.4) - was admitted for this. Was taking hydrochlorothiazide, lisinopril and BB at that time. After this was getting twice a week paracentesis, sometimes up to 10 L. He does not eat processed foods, has cut back on his salt and now getting rob every 5-6 days, 6-10 liters with albumin replacement. Sent for cell count every time, no history of SBP. Not restricting fluid specifically because gets light headed with this, does not think he is drinking too much. Taking lasix 80 mg daily, dose increased a few weeks ago.     He has never done alcohol counseling. No history of DUIs or trouble at work but there was an instance where someone thought he smelled like alcohol, had to get tested and that was negative. He is committed to never drinking again, does not have cravings to drink.     Painful leg tremors/shaking/cramping at night - causing sleep deprivation. Started magnesium and iron for this. Having issues with insomnia.     No history  of HE, variceal bleeding. Had an EGD 6/2021 that showed grade 2 EVs with red whales, banded. Also severe PHG.     Had negative hepatitis B and C testing in the past. Getting vaccinated against hepatitis A/B.     Admission for SBP 8/27/2021 - presented with abdominal pain and chills. Found to have SBP and strep salivarius bacteremia. Treated with IV ceftriaxone, then transitioned to augmentin, then cipro ppx. Hospital course complicated by DELLA (creatinine 1.95), got IV albumin, creatinine  improved on discharge to 1.19. Na stable in the mid 120s.    Admission for variceal bleeding 9/4-7. Was at home with daughter on the weekend - felt really dizzy and lightheaded, passed out. Underwent an EGD - was banded x 5 on 9/5. Taking augment to complete this course for SBP ppx and then will go back to cipro daily. Hospital course c/b DELLA - creatinine 1.9, down to 1.6 on discharge. Taking lasix 40 mg daily.     Interval Events  - Admitted for variceal bleeding the end of September. Transferred for TIPS 9/22/2021 - gradient 23 -> 9 mmHG.   - Since his TIPS fluid has been getting better - underwent paracentesis 9/30, 10/12. Recently restarted diuretics, lasix 40, creatinine and Na stable on this. Legs are now swollen to the thighs which was not an issue in the past  - No issues with confusion post TIPS. Having pain around his hernia, had to go to the ED once because he could not reduce it, has been better since.   - Less tired during the day now, muscle weakness and appetite is better    ROS: 14 point ROS negative except for positives noted in HPI.    PMHx:  - AUD  - Alcohol related cirrhosis c/b refractory ascites/SBP, variceal bleeding  - Asthma  - HTN    PSHx:  - Back surgery microdisectomy    FamHx:  No family history of liver disease, liver cancer    SocHx:  Social History     Socioeconomic History     Marital status:      Spouse name: Not on file     Number of children: Not on file     Years of education: Not on file      Highest education level: Not on file   Occupational History     Not on file   Tobacco Use     Smoking status: Former Smoker     Smokeless tobacco: Never Used     Tobacco comment: College   Substance and Sexual Activity     Alcohol use: Not Currently     Comment: Quit ETOH 10/2020     Drug use: Not Currently     Sexual activity: Not on file   Other Topics Concern     Parent/sibling w/ CABG, MI or angioplasty before 65F 55M? Not Asked   Social History Narrative     Not on file     Social Determinants of Health     Financial Resource Strain:      Difficulty of Paying Living Expenses:    Food Insecurity:      Worried About Running Out of Food in the Last Year:      Ran Out of Food in the Last Year:    Transportation Needs:      Lack of Transportation (Medical):      Lack of Transportation (Non-Medical):    Physical Activity:      Days of Exercise per Week:      Minutes of Exercise per Session:    Stress:      Feeling of Stress :    Social Connections:      Frequency of Communication with Friends and Family:      Frequency of Social Gatherings with Friends and Family:      Attends L[employer] Services:      Active Member of Clubs or Organizations:      Attends Club or Organization Meetings:      Marital Status:    Intimate Partner Violence:      Fear of Current or Ex-Partner:      Emotionally Abused:      Physically Abused:      Sexually Abused:    Lives with 5 year old daughter and wife  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   No NSAIDs    Allergies:  Allergies   Allergen Reactions     Ketorolac Tromethamine Other (See Comments)       Objective:  /71   Pulse 88   Wt 114.7 kg (252 lb 12.8 oz)   SpO2 100%   BMI 34.29 kg/m    Constitutional: pleasant man in NAD  Eyes: non icteric  Respiratory: Normal respiratory excursion   Abd: Non distended  Skin: No jaundice  Psychiatric: normal mood and orientation    Labs:  Last Comprehensive Metabolic Panel:  Sodium   Date Value Ref Range Status   10/18/2021 143 133 - 144 mmol/L Final     Potassium   Date Value Ref Range Status   10/18/2021 3.4 3.4 - 5.3 mmol/L Final     Chloride   Date Value Ref Range Status   10/18/2021 111 (H) 94 - 109 mmol/L Final     Carbon Dioxide (CO2)   Date Value Ref Range Status   10/18/2021 23 20 - 32 mmol/L Final     Anion Gap   Date Value Ref Range Status   10/18/2021 9 3 - 14 mmol/L Final     Glucose   Date Value Ref Range Status   10/18/2021 110 (H) 70 - 99 mg/dL Final     Urea Nitrogen   Date Value Ref Range Status   10/18/2021 11 7 - 30 mg/dL Final     Creatinine   Date Value Ref Range Status   10/18/2021 0.75 0.66 - 1.25 mg/dL Final     GFR Estimate   Date Value Ref Range Status   10/18/2021 >90 >60 mL/min/1.73m2 Final     Comment:     As of July 11, 2021, eGFR is calculated by the CKD-EPI creatinine equation, without race adjustment. eGFR can be influenced by muscle mass, exercise, and diet. The reported eGFR is an estimation only and is only applicable if the renal function is stable.     Calcium   Date Value Ref Range Status   10/18/2021 8.2 (L) 8.5 - 10.1 mg/dL Final     Bilirubin Total   Date Value Ref Range Status   10/18/2021 1.4 (H) 0.2 - 1.3 mg/dL Final     Alkaline Phosphatase   Date Value Ref Range Status   09/24/2021 120 40 - 150 U/L Final     ALT   Date Value Ref Range Status   09/24/2021 87 (H) 0 - 70 U/L Final     AST   Date Value Ref Range Status   09/24/2021 64  (H) 0 - 45 U/L Final       Lab Results   Component Value Date    WBC 8.5 08/18/2021     Lab Results   Component Value Date    RBC 3.26 08/18/2021     Lab Results   Component Value Date    HGB 9.7 08/18/2021     Lab Results   Component Value Date    HCT 29.0 08/18/2021     Lab Results   Component Value Date    MCV 89 08/18/2021     Lab Results   Component Value Date    MCH 29.8 08/18/2021     Lab Results   Component Value Date    MCHC 33.4 08/18/2021     Lab Results   Component Value Date    RDW 14.2 08/18/2021     Lab Results   Component Value Date     08/18/2021       INR   Date Value Ref Range Status   10/18/2021 1.35 (H) 0.85 - 1.15 Final     Last Comprehensive Metabolic Panel:  Sodium   Date Value Ref Range Status   10/18/2021 143 133 - 144 mmol/L Final     Potassium   Date Value Ref Range Status   10/18/2021 3.4 3.4 - 5.3 mmol/L Final     Chloride   Date Value Ref Range Status   10/18/2021 111 (H) 94 - 109 mmol/L Final     Carbon Dioxide (CO2)   Date Value Ref Range Status   10/18/2021 23 20 - 32 mmol/L Final     Anion Gap   Date Value Ref Range Status   10/18/2021 9 3 - 14 mmol/L Final     Glucose   Date Value Ref Range Status   10/18/2021 110 (H) 70 - 99 mg/dL Final     Urea Nitrogen   Date Value Ref Range Status   10/18/2021 11 7 - 30 mg/dL Final     Creatinine   Date Value Ref Range Status   10/18/2021 0.75 0.66 - 1.25 mg/dL Final     GFR Estimate   Date Value Ref Range Status   10/18/2021 >90 >60 mL/min/1.73m2 Final     Comment:     As of July 11, 2021, eGFR is calculated by the CKD-EPI creatinine equation, without race adjustment. eGFR can be influenced by muscle mass, exercise, and diet. The reported eGFR is an estimation only and is only applicable if the renal function is stable.     Calcium   Date Value Ref Range Status   10/18/2021 8.2 (L) 8.5 - 10.1 mg/dL Final     Bilirubin Total   Date Value Ref Range Status   10/18/2021 1.4 (H) 0.2 - 1.3 mg/dL Final     Alkaline Phosphatase   Date Value  Ref Range Status   09/24/2021 120 40 - 150 U/L Final     ALT   Date Value Ref Range Status   09/24/2021 87 (H) 0 - 70 U/L Final     AST   Date Value Ref Range Status   09/24/2021 64 (H) 0 - 45 U/L Final             Lab Results   Component Value Date    WBC 8.3 09/10/2021     Lab Results   Component Value Date    RBC 2.64 09/10/2021     Lab Results   Component Value Date    HGB 7.9 09/10/2021     Lab Results   Component Value Date    HCT 24.3 09/10/2021     No components found for: MCT  Lab Results   Component Value Date    MCV 92 09/10/2021     Lab Results   Component Value Date    MCH 29.9 09/10/2021     Lab Results   Component Value Date    MCHC 32.5 09/10/2021     Lab Results   Component Value Date    RDW 14.4 09/10/2021     Lab Results   Component Value Date     09/10/2021     MELD-Na score: 11 at 10/18/2021 10:10 AM  MELD score: 11 at 10/18/2021 10:10 AM  Calculated from:  Serum Creatinine: 0.75 mg/dL (Using min of 1 mg/dL) at 10/18/2021 10:10 AM  Serum Sodium: 143 mmol/L (Using max of 137 mmol/L) at 10/18/2021 10:10 AM  Total Bilirubin: 1.4 mg/dL at 10/18/2021 10:10 AM  INR(ratio): 1.35 at 10/18/2021 10:10 AM  Age: 41 years    Imaging:    CT AP 8/27/2021    ABDOMEN/PELVIS:     No free intraperitoneal air. Large volume ascites. Ascites extends into a noninflamed umbilical ventral wall hernia.     Heterogeneous and cirrhotic appearance of the liver. The portal venous system appears patent. Portosystemic collateralization includes but is not limited to gastroesophageal varices. The spleen is mildly enlarged at 16 cm oblique craniocaudad dimension.     Gallbladder, pancreas, adrenals, kidneys, and pelvic organs are unremarkable.     No bowel obstruction. Mild wall thickening of multiple small bowel loops and portions of the colon may reflect portal enteropathy and colopathy. The appendix is normal.     Small lymph nodes are scattered throughout the upper abdomen, mesentery, and retroperitoneum. These are  nonspecific, but likely reactive in a patient with chronic liver disease.     MUSCULOSKELETAL: No aggressive appearing bone lesion.       IMPRESSION:     1. Heterogeneous and cirrhotic appearing liver with portosystemic collateralization and mild splenomegaly.   2. Large volume ascites.   3. Mild wall thickening of multiple small bowel loops and portions of the colon likely reflects portal enteropathy and colopathy, though other types of enteritis and colitis are not excluded.   4. Noninflamed umbilical hernia containing ascites.      Endoscopy: Reviewed in EHR    Independently reviewed labs and imaging.      Assessment/Plan: Mr. Nowak is a 41 year old man with a history of AUD, alcohol related cirrhosis c/b refractory ascites/SBP, recurrent variceal bleeding, now s/p TIPS 9/22.     He is doing well post TIPS, still requiring rob  < 1 month out but they are decreased in frequency and volume.     Restarted diuretics and his labs have been stable on this.     - Increase diuretics to lasix 40 mg BID and spironolactone 50 mg daily - labs Friday at [facility]  - Continue paracentesis weekly PRN 6 L max with 12.5 grams/L albumin replacement  - Hold on LT evaluation given he is responding to TIPS and also low MELD  - [facility] for [facility]  - Continue cipro for SBP ppx  - Continue low sodium diet  - Followed by complex care management RN  - Discussed complete sobriety from alcohol, including NA beers  - Discussed can consider surgical repair of hernia is ascites improved, if he has recurrent severe pain he should present to the ED    RTC 4 weeks    Cadence Murray MD MS  Hepatology/Liver Transplant  [facility]

## 2021-10-18 NOTE — NURSING NOTE
Chief Complaint   Patient presents with     RECHECK     1 month f/u      Blood pressure 110/71, pulse 88, weight 114.7 kg (252 lb 12.8 oz), SpO2 100 %.    Norma Lei, CMA

## 2021-10-18 NOTE — LETTER
10/18/2021         RE: Florian Nowak  [address] [facility] [address]        Dear Colleague,    Thank you for referring your patient, Florian Nowak, to the [facility] [place]. Please see a copy of my visit note below.    [facility] Return Patient Visit    Date of Visit: September 8, 2021    Reason for referral: Alcohol related liver disease    Subjective: Mr. Nowak is a 41 year old man with a history of AUD, alcohol related cirrhosis, who presents for follow up of his decompensated cirrhosis    Initial History:     He was told he had KANG in his 30s while living in [place]. Was around 300 lbs then. At that time was drinking 5 drinks 5 days a week. Ferritin was elevated, did not get HC testing.     He reports he would drink 5 drinks 5 days a week roughly. He would have some periods of sobriety for a few months when trying to quit. Spring 2020 cut back to 2 glasses of wine a day. Was not feeling well, stopping drinking in October and then suddenly got bloated. Presented to urgent care 10/2020 found to have new ascites. BR was 5 at that time.     First few months could go 2-4 weeks between paracentesis, had to increase frequency. In May K noted to be high with DELLA (creatinine 3.4) - was admitted for this. Was taking hydrochlorothiazide, lisinopril and BB at that time. After this was getting twice a week paracentesis, sometimes up to 10 L. He does not eat processed foods, has cut back on his salt and now getting rob every 5-6 days, 6-10 liters with albumin replacement. Sent for cell count every time, no history of SBP. Not restricting fluid specifically because gets light headed with this, does not think he is drinking too much. Taking lasix 80 mg daily, dose increased a few weeks ago.     He has never done alcohol counseling. No history of DUIs or trouble at work but there was an instance where someone thought he smelled like  alcohol, had to get tested and that was negative. He is committed to never drinking again, does not have cravings to drink.     Painful leg tremors/shaking/cramping at night - causing sleep deprivation. Started magnesium and iron for this. Having issues with insomnia.     No history of HE, variceal bleeding. Had an EGD 6/2021 that showed grade 2 EVs with red whales, banded. Also severe PHG.     Had negative hepatitis B and C testing in the past. Getting vaccinated against hepatitis A/B.     Admission for SBP 8/27/2021 - presented with abdominal pain and chills. Found to have SBP and strep salivarius bacteremia. Treated with IV ceftriaxone, then transitioned to augmentin, then cipro ppx. Hospital course complicated by DELLA (creatinine 1.95), got IV albumin, creatinine  improved on discharge to 1.19. Na stable in the mid 120s.    Admission for variceal bleeding 9/4-7. Was at home with daughter on the weekend - felt really dizzy and lightheaded, passed out. Underwent an EGD - was banded x 5 on 9/5. Taking augment to complete this course for SBP ppx and then will go back to cipro daily. Hospital course c/b DELLA - creatinine 1.9, down to 1.6 on discharge. Taking lasix 40 mg daily.     Interval Events  - Admitted for variceal bleeding the end of September. Transferred for TIPS 9/22/2021 - gradient 23 -> 9 mmHG.   - Since his TIPS fluid has been getting better - underwent paracentesis 9/30, 10/12. Recently restarted diuretics, lasix 40, creatinine and Na stable on this. Legs are now swollen to the thighs which was not an issue in the past  - No issues with confusion post TIPS. Having pain around his hernia, had to go to the ED once because he could not reduce it, has been better since.   - Less tired during the day now, muscle weakness and appetite is better    ROS: 14 point ROS negative except for positives noted in HPI.    PMHx:  - AUD  - Alcohol related cirrhosis c/b refractory ascites/SBP, variceal bleeding  - Asthma  -  HTN    PSHx:  - Back surgery microdisectomy    FamHx:  No family history of liver disease, liver cancer    SocHx:  Social History     Socioeconomic History     Marital status:      Spouse name: Not on file     Number of children: Not on file     Years of education: Not on file     Highest education level: Not on file   Occupational History     Not on file   Tobacco Use     Smoking status: Former Smoker     Smokeless tobacco: Never Used     Tobacco comment: College   Substance and Sexual Activity     Alcohol use: Not Currently     Comment: Quit ETOH 10/2020     Drug use: Not Currently     Sexual activity: Not on file   Other Topics Concern     Parent/sibling w/ CABG, MI or angioplasty before 65F 55M? Not Asked   Social History Narrative     Not on file     Social Determinants of Health     Financial Resource Strain:      Difficulty of Paying Living Expenses:    Food Insecurity:      Worried About Running Out of Food in the Last Year:      Ran Out of Food in the Last Year:    Transportation Needs:      Lack of Transportation (Medical):      Lack of Transportation (Non-Medical):    Physical Activity:      Days of Exercise per Week:      Minutes of Exercise per Session:    Stress:      Feeling of Stress :    Social Connections:      Frequency of Communication with Friends and Family:      Frequency of Social Gatherings with Friends and Family:      Attends [employer] Services:      Active Member of Clubs or Organizations:      Attends Club or Organization Meetings:      Marital Status:    Intimate Partner Violence:      Fear of Current or Ex-Partner:      Emotionally Abused:      Physically Abused:      Sexually Abused:    Lives with 5 year old daughter and wife  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   No NSAIDs    Allergies:  Allergies   Allergen Reactions     Ketorolac Tromethamine Other (See Comments)       Objective:  /71   Pulse 88   Wt 114.7 kg (252 lb 12.8 oz)   SpO2 100%   BMI 34.29 kg/m    Constitutional: pleasant man in NAD  Eyes: non icteric  Respiratory: Normal respiratory excursion   Abd: Non distended  Skin: No jaundice  Psychiatric: normal mood and orientation    Labs:  Last Comprehensive Metabolic Panel:  Sodium   Date Value Ref Range Status   10/18/2021 143 133 - 144 mmol/L Final     Potassium   Date Value Ref Range Status   10/18/2021 3.4 3.4 - 5.3 mmol/L Final     Chloride   Date Value Ref Range Status   10/18/2021 111 (H) 94 - 109 mmol/L Final     Carbon Dioxide (CO2)   Date Value Ref Range Status   10/18/2021 23 20 - 32 mmol/L Final     Anion Gap   Date Value Ref Range Status   10/18/2021 9 3 - 14 mmol/L Final     Glucose   Date Value Ref Range Status   10/18/2021 110 (H) 70 - 99 mg/dL Final     Urea Nitrogen   Date Value Ref Range Status   10/18/2021 11 7 - 30 mg/dL Final     Creatinine   Date Value Ref Range Status   10/18/2021 0.75 0.66 - 1.25 mg/dL Final     GFR Estimate   Date Value Ref Range Status   10/18/2021 >90 >60 mL/min/1.73m2 Final     Comment:     As of July 11, 2021, eGFR is calculated by the CKD-EPI creatinine equation, without race adjustment. eGFR can be influenced by muscle mass, exercise, and diet. The reported eGFR is an estimation only and is only applicable if the renal function is stable.     Calcium   Date Value Ref Range Status   10/18/2021 8.2 (L) 8.5 - 10.1 mg/dL Final     Bilirubin Total   Date  Value Ref Range Status   10/18/2021 1.4 (H) 0.2 - 1.3 mg/dL Final     Alkaline Phosphatase   Date Value Ref Range Status   09/24/2021 120 40 - 150 U/L Final     ALT   Date Value Ref Range Status   09/24/2021 87 (H) 0 - 70 U/L Final     AST   Date Value Ref Range Status   09/24/2021 64 (H) 0 - 45 U/L Final       Lab Results   Component Value Date    WBC 8.5 08/18/2021     Lab Results   Component Value Date    RBC 3.26 08/18/2021     Lab Results   Component Value Date    HGB 9.7 08/18/2021     Lab Results   Component Value Date    HCT 29.0 08/18/2021     Lab Results   Component Value Date    MCV 89 08/18/2021     Lab Results   Component Value Date    MCH 29.8 08/18/2021     Lab Results   Component Value Date    MCHC 33.4 08/18/2021     Lab Results   Component Value Date    RDW 14.2 08/18/2021     Lab Results   Component Value Date     08/18/2021       INR   Date Value Ref Range Status   10/18/2021 1.35 (H) 0.85 - 1.15 Final     Last Comprehensive Metabolic Panel:  Sodium   Date Value Ref Range Status   10/18/2021 143 133 - 144 mmol/L Final     Potassium   Date Value Ref Range Status   10/18/2021 3.4 3.4 - 5.3 mmol/L Final     Chloride   Date Value Ref Range Status   10/18/2021 111 (H) 94 - 109 mmol/L Final     Carbon Dioxide (CO2)   Date Value Ref Range Status   10/18/2021 23 20 - 32 mmol/L Final     Anion Gap   Date Value Ref Range Status   10/18/2021 9 3 - 14 mmol/L Final     Glucose   Date Value Ref Range Status   10/18/2021 110 (H) 70 - 99 mg/dL Final     Urea Nitrogen   Date Value Ref Range Status   10/18/2021 11 7 - 30 mg/dL Final     Creatinine   Date Value Ref Range Status   10/18/2021 0.75 0.66 - 1.25 mg/dL Final     GFR Estimate   Date Value Ref Range Status   10/18/2021 >90 >60 mL/min/1.73m2 Final     Comment:     As of July 11, 2021, eGFR is calculated by the CKD-EPI creatinine equation, without race adjustment. eGFR can be influenced by muscle mass, exercise, and diet. The reported eGFR is an  estimation only and is only applicable if the renal function is stable.     Calcium   Date Value Ref Range Status   10/18/2021 8.2 (L) 8.5 - 10.1 mg/dL Final     Bilirubin Total   Date Value Ref Range Status   10/18/2021 1.4 (H) 0.2 - 1.3 mg/dL Final     Alkaline Phosphatase   Date Value Ref Range Status   09/24/2021 120 40 - 150 U/L Final     ALT   Date Value Ref Range Status   09/24/2021 87 (H) 0 - 70 U/L Final     AST   Date Value Ref Range Status   09/24/2021 64 (H) 0 - 45 U/L Final             Lab Results   Component Value Date    WBC 8.3 09/10/2021     Lab Results   Component Value Date    RBC 2.64 09/10/2021     Lab Results   Component Value Date    HGB 7.9 09/10/2021     Lab Results   Component Value Date    HCT 24.3 09/10/2021     No components found for: MCT  Lab Results   Component Value Date    MCV 92 09/10/2021     Lab Results   Component Value Date    MCH 29.9 09/10/2021     Lab Results   Component Value Date    MCHC 32.5 09/10/2021     Lab Results   Component Value Date    RDW 14.4 09/10/2021     Lab Results   Component Value Date     09/10/2021     MELD-Na score: 11 at 10/18/2021 10:10 AM  MELD score: 11 at 10/18/2021 10:10 AM  Calculated from:  Serum Creatinine: 0.75 mg/dL (Using min of 1 mg/dL) at 10/18/2021 10:10 AM  Serum Sodium: 143 mmol/L (Using max of 137 mmol/L) at 10/18/2021 10:10 AM  Total Bilirubin: 1.4 mg/dL at 10/18/2021 10:10 AM  INR(ratio): 1.35 at 10/18/2021 10:10 AM  Age: 41 years    Imaging:    CT AP 8/27/2021    ABDOMEN/PELVIS:     No free intraperitoneal air. Large volume ascites. Ascites extends into a noninflamed umbilical ventral wall hernia.     Heterogeneous and cirrhotic appearance of the liver. The portal venous system appears patent. Portosystemic collateralization includes but is not limited to gastroesophageal varices. The spleen is mildly enlarged at 16 cm oblique craniocaudad dimension.     Gallbladder, pancreas, adrenals, kidneys, and pelvic organs are  unremarkable.     No bowel obstruction. Mild wall thickening of multiple small bowel loops and portions of the colon may reflect portal enteropathy and colopathy. The appendix is normal.     Small lymph nodes are scattered throughout the upper abdomen, mesentery, and retroperitoneum. These are nonspecific, but likely reactive in a patient with chronic liver disease.     MUSCULOSKELETAL: No aggressive appearing bone lesion.       IMPRESSION:     1. Heterogeneous and cirrhotic appearing liver with portosystemic collateralization and mild splenomegaly.   2. Large volume ascites.   3. Mild wall thickening of multiple small bowel loops and portions of the colon likely reflects portal enteropathy and colopathy, though other types of enteritis and colitis are not excluded.   4. Noninflamed umbilical hernia containing ascites.      Endoscopy: Reviewed in EHR    Independently reviewed labs and imaging.      Assessment/Plan: Mr. Nowak is a 41 year old man with a history of AUD, alcohol related cirrhosis c/b refractory ascites/SBP, recurrent variceal bleeding, now s/p TIPS 9/22.     He is doing well post TIPS, still requiring rob  < 1 month out but they are decreased in frequency and volume.     Restarted diuretics and his labs have been stable on this.     - Increase diuretics to lasix 40 mg BID and spironolactone 50 mg daily - labs Friday at [facility] clinic  - Continue paracentesis weekly PRN 6 L max with 12.5 grams/L albumin replacement  - Hold on LT evaluation given he is responding to TIPS and also low MELD  - [facility]  - Continue cipro for SBP ppx  - Continue low sodium diet  - Followed by complex care management RN  - Discussed complete sobriety from alcohol, including NA beers  - Discussed can consider surgical repair of hernia is ascites improved, if he has recurrent severe pain he should present to the ED    RTC 4 weeks    Cadence Mruray MD MS  Hepatology/Liver  Transplant  [facility]          Again, thank you for allowing me to participate in the care of your patient.        Sincerely,        Cadence Murray MD

## 2021-10-19 ENCOUNTER — PATIENT OUTREACH (OUTPATIENT)
Dept: GASTROENTEROLOGY | Facility: CLINIC | Age: 41
End: 2021-10-19

## 2021-10-19 DIAGNOSIS — K70.31 ALCOHOLIC CIRRHOSIS OF LIVER WITH ASCITES (H): Primary | ICD-10-CM

## 2021-10-19 NOTE — PROGRESS NOTES
Pt had hepatology follow up on 10/18. Plan established at this visit per Dr. Murray:     1) Increase diuretics to lasix 40 mg 2 times daily   2) Start spironolactone 50 mg daily  3) Labs on 10/22  4) Continue weekly paracentesis as needed with 6 liter drain limit and 12.5 grams/liter albumin replacement  5) Hold on LT evaluation given his response to TIPS and low MELD  6) Continue to follow with [facility] for TIPS follow up  7) Ciprofloxacin 500 mg daily for SBP prophylaxis  8) Continue low sodium diet   9) Complete sobriety from alcohol, including NA beers  10) Surgery consult for hernia if ascites improved and has recurrent severe pain   11) Return to clinic in 4 weeks       Plan to look for lab results on 10/22 and follow up with pt in 1 week.              Statement Selected

## 2021-10-25 ENCOUNTER — TELEPHONE (OUTPATIENT)
Dept: FAMILY MEDICINE | Facility: CLINIC | Age: 41
End: 2021-10-25
Payer: COMMERCIAL

## 2021-10-25 RX ORDER — METHYLPREDNISOLONE SODIUM SUCCINATE 125 MG/2ML
125 INJECTION, POWDER, LYOPHILIZED, FOR SOLUTION INTRAMUSCULAR; INTRAVENOUS
Status: CANCELLED
Start: 2021-10-25

## 2021-10-25 RX ORDER — MEPERIDINE HYDROCHLORIDE 25 MG/ML
25 INJECTION INTRAMUSCULAR; INTRAVENOUS; SUBCUTANEOUS EVERY 30 MIN PRN
Status: CANCELLED | OUTPATIENT
Start: 2021-10-25

## 2021-10-25 RX ORDER — ALBUMIN (HUMAN) 12.5 G/50ML
12.5 SOLUTION INTRAVENOUS
Status: CANCELLED | OUTPATIENT
Start: 2021-10-25

## 2021-10-25 RX ORDER — HEPARIN SODIUM,PORCINE 10 UNIT/ML
5 VIAL (ML) INTRAVENOUS
Status: CANCELLED | OUTPATIENT
Start: 2021-10-25

## 2021-10-25 RX ORDER — LIDOCAINE HYDROCHLORIDE 10 MG/ML
20 INJECTION, SOLUTION EPIDURAL; INFILTRATION; INTRACAUDAL; PERINEURAL ONCE
Status: CANCELLED | OUTPATIENT
Start: 2021-10-25 | End: 2021-10-25

## 2021-10-25 RX ORDER — ALBUTEROL SULFATE 90 UG/1
1-2 AEROSOL, METERED RESPIRATORY (INHALATION)
Status: CANCELLED
Start: 2021-10-25

## 2021-10-25 RX ORDER — HEPARIN SODIUM (PORCINE) LOCK FLUSH IV SOLN 100 UNIT/ML 100 UNIT/ML
5 SOLUTION INTRAVENOUS
Status: CANCELLED | OUTPATIENT
Start: 2021-10-25

## 2021-10-25 RX ORDER — EPINEPHRINE 1 MG/ML
0.3 INJECTION, SOLUTION, CONCENTRATE INTRAVENOUS EVERY 5 MIN PRN
Status: CANCELLED | OUTPATIENT
Start: 2021-10-25

## 2021-10-25 RX ORDER — ALBUTEROL SULFATE 0.83 MG/ML
2.5 SOLUTION RESPIRATORY (INHALATION)
Status: CANCELLED | OUTPATIENT
Start: 2021-10-25

## 2021-10-25 RX ORDER — DIPHENHYDRAMINE HYDROCHLORIDE 50 MG/ML
50 INJECTION INTRAMUSCULAR; INTRAVENOUS
Status: CANCELLED
Start: 2021-10-25

## 2021-10-25 RX ORDER — NALOXONE HYDROCHLORIDE 0.4 MG/ML
0.2 INJECTION, SOLUTION INTRAMUSCULAR; INTRAVENOUS; SUBCUTANEOUS
Status: CANCELLED | OUTPATIENT
Start: 2021-10-25

## 2021-10-25 NOTE — TELEPHONE ENCOUNTER
Not an [facility] patient.   left asking Landy to call back.    No Covid + patient's in clinic.  Ngozi Llanos RN

## 2021-10-25 NOTE — TELEPHONE ENCOUNTER
Call from Landy - MICHELE at the [facility].  They changed patients water pills and he will need f/u labs.    Patient recently tested positive for COVID, so what is [facility]'s process for getting labs done?  Please call Landy on her direct line at [phone]  Evelyn Echevarria RN  [facility]

## 2021-10-25 NOTE — PROGRESS NOTES
Pt tested positive for COVID 19 on 10/21. Pt was due for repeat labs on 10/22 but appointment was cancelled due to diagnosis. Per Dr. Murray, pt needs labs early this week due to recent increase in diuretics. Spoke with [facility] who stated that pt will not be able to get labs drawn there with positive COVID-19 test. Spoke with Nabor, staff from the lab at the [facility], who stated that pt can get labs drawn there but will need to be roomed right away.     Connected with pt to discuss plan for labs and paracentesis. Pt agreeable to getting labs drawn at [facility] and appointment scheduled for 10/26. Pt reported moderate abdominal distension and is unsure whether he can wait until paracentesis appointment on 11/1 at [facility]. Spoke with scheduling for [facility] and was told pt cannot have paracentesis there until asymptomatic x 10 days. Per pt report, pt still has an intermittent cough. Message sent to [facility] to see if accommodations can be made for pt to get paracentesis there and not have to utilize emergency room if pt becomes too uncomfortable to wait.

## 2021-10-26 ENCOUNTER — LAB (OUTPATIENT)
Dept: LAB | Facility: CLINIC | Age: 41
End: 2021-10-26
Payer: COMMERCIAL

## 2021-10-26 DIAGNOSIS — K70.31 ALCOHOLIC CIRRHOSIS OF LIVER WITH ASCITES (H): ICD-10-CM

## 2021-10-26 LAB
ANION GAP SERPL CALCULATED.3IONS-SCNC: 5 MMOL/L (ref 3–14)
BUN SERPL-MCNC: 10 MG/DL (ref 7–30)
CALCIUM SERPL-MCNC: 7.8 MG/DL (ref 8.5–10.1)
CHLORIDE BLD-SCNC: 111 MMOL/L (ref 94–109)
CO2 SERPL-SCNC: 25 MMOL/L (ref 20–32)
CREAT SERPL-MCNC: 0.66 MG/DL (ref 0.66–1.25)
GFR SERPL CREATININE-BSD FRML MDRD: >90 ML/MIN/1.73M2
GLUCOSE BLD-MCNC: 95 MG/DL (ref 70–99)
POTASSIUM BLD-SCNC: 3.8 MMOL/L (ref 3.4–5.3)
SODIUM SERPL-SCNC: 141 MMOL/L (ref 133–144)

## 2021-10-26 PROCEDURE — 80048 BASIC METABOLIC PNL TOTAL CA: CPT | Performed by: PATHOLOGY

## 2021-10-27 ENCOUNTER — MYC MEDICAL ADVICE (OUTPATIENT)
Dept: GASTROENTEROLOGY | Facility: CLINIC | Age: 41
End: 2021-10-27

## 2021-10-27 RX ORDER — SPIRONOLACTONE 50 MG/1
50 TABLET, FILM COATED ORAL 2 TIMES DAILY
Qty: 30 TABLET | Refills: 3 | COMMUNITY
Start: 2021-10-27 | End: 2021-11-08

## 2021-10-27 RX ORDER — FUROSEMIDE 40 MG
60 TABLET ORAL 2 TIMES DAILY
Qty: 60 TABLET | Refills: 3 | COMMUNITY
Start: 2021-10-27 | End: 2021-11-08

## 2021-11-05 ENCOUNTER — LAB (OUTPATIENT)
Dept: LAB | Facility: CLINIC | Age: 41
End: 2021-11-05
Payer: COMMERCIAL

## 2021-11-05 DIAGNOSIS — K70.31 ALCOHOLIC CIRRHOSIS OF LIVER WITH ASCITES (H): ICD-10-CM

## 2021-11-05 PROCEDURE — 80048 BASIC METABOLIC PNL TOTAL CA: CPT

## 2021-11-05 PROCEDURE — 36415 COLL VENOUS BLD VENIPUNCTURE: CPT

## 2021-11-06 LAB
ANION GAP SERPL CALCULATED.3IONS-SCNC: 7 MMOL/L (ref 3–14)
BUN SERPL-MCNC: 14 MG/DL (ref 7–30)
CALCIUM SERPL-MCNC: 8.2 MG/DL (ref 8.5–10.1)
CHLORIDE BLD-SCNC: 106 MMOL/L (ref 94–109)
CO2 SERPL-SCNC: 23 MMOL/L (ref 20–32)
CREAT SERPL-MCNC: 0.82 MG/DL (ref 0.66–1.25)
GFR SERPL CREATININE-BSD FRML MDRD: >90 ML/MIN/1.73M2
GLUCOSE BLD-MCNC: 75 MG/DL (ref 70–99)
POTASSIUM BLD-SCNC: 3.9 MMOL/L (ref 3.4–5.3)
SODIUM SERPL-SCNC: 136 MMOL/L (ref 133–144)

## 2021-11-11 ENCOUNTER — ANESTHESIA (OUTPATIENT)
Dept: SURGERY | Facility: CLINIC | Age: 41
End: 2021-11-11
Payer: COMMERCIAL

## 2021-11-11 ENCOUNTER — ANESTHESIA EVENT (OUTPATIENT)
Dept: SURGERY | Facility: CLINIC | Age: 41
End: 2021-11-11
Payer: COMMERCIAL

## 2021-11-11 ENCOUNTER — HOSPITAL ENCOUNTER (OUTPATIENT)
Facility: CLINIC | Age: 41
Discharge: HOME | End: 2021-11-12
Attending: EMERGENCY MEDICINE | Admitting: SURGERY
Payer: COMMERCIAL

## 2021-11-11 DIAGNOSIS — Z20.822 LAB TEST NEGATIVE FOR COVID-19 VIRUS: ICD-10-CM

## 2021-11-11 DIAGNOSIS — K42.0 INCARCERATED UMBILICAL HERNIA: ICD-10-CM

## 2021-11-11 LAB
ABO/RH(D): NORMAL
ALBUMIN SERPL-MCNC: 2.7 G/DL (ref 3.4–5)
ALBUMIN UR-MCNC: NEGATIVE MG/DL
ALP SERPL-CCNC: 122 U/L (ref 40–150)
ALT SERPL W P-5'-P-CCNC: 17 U/L (ref 0–70)
ANION GAP SERPL CALCULATED.3IONS-SCNC: 5 MMOL/L (ref 3–14)
ANTIBODY SCREEN: NEGATIVE
APPEARANCE UR: CLEAR
APTT PPP: 36 SECONDS (ref 22–38)
AST SERPL W P-5'-P-CCNC: 33 U/L (ref 0–45)
BASOPHILS # BLD AUTO: 0 10E3/UL (ref 0–0.2)
BASOPHILS NFR BLD AUTO: 0 %
BILIRUB SERPL-MCNC: 1.3 MG/DL (ref 0.2–1.3)
BILIRUB UR QL STRIP: NEGATIVE
BUN SERPL-MCNC: 16 MG/DL (ref 7–30)
CALCIUM SERPL-MCNC: 8.9 MG/DL (ref 8.5–10.1)
CHLORIDE BLD-SCNC: 104 MMOL/L (ref 94–109)
CO2 SERPL-SCNC: 25 MMOL/L (ref 20–32)
COLOR UR AUTO: ABNORMAL
CREAT SERPL-MCNC: 0.82 MG/DL (ref 0.66–1.25)
EOSINOPHIL # BLD AUTO: 0.2 10E3/UL (ref 0–0.7)
EOSINOPHIL NFR BLD AUTO: 5 %
ERYTHROCYTE [DISTWIDTH] IN BLOOD BY AUTOMATED COUNT: 13.8 % (ref 10–15)
GFR SERPL CREATININE-BSD FRML MDRD: >90 ML/MIN/1.73M2
GLUCOSE BLD-MCNC: 153 MG/DL (ref 70–99)
GLUCOSE BLDC GLUCOMTR-MCNC: 105 MG/DL (ref 70–99)
GLUCOSE UR STRIP-MCNC: NEGATIVE MG/DL
GRAM STAIN RESULT: NORMAL
GRAM STAIN RESULT: NORMAL
HCT VFR BLD AUTO: 27.5 % (ref 40–53)
HGB BLD-MCNC: 8.7 G/DL (ref 13.3–17.7)
HGB UR QL STRIP: NEGATIVE
HYALINE CASTS: 3 /LPF
IMM GRANULOCYTES # BLD: 0 10E3/UL
IMM GRANULOCYTES NFR BLD: 0 %
INR PPP: 1.27 (ref 0.86–1.14)
KETONES UR STRIP-MCNC: NEGATIVE MG/DL
LACTATE SERPL-SCNC: 2.4 MMOL/L (ref 0.7–2)
LEUKOCYTE ESTERASE UR QL STRIP: NEGATIVE
LIPASE SERPL-CCNC: 237 U/L (ref 73–393)
LYMPHOCYTES # BLD AUTO: 0.7 10E3/UL (ref 0.8–5.3)
LYMPHOCYTES NFR BLD AUTO: 20 %
MCH RBC QN AUTO: 27.7 PG (ref 26.5–33)
MCHC RBC AUTO-ENTMCNC: 31.6 G/DL (ref 31.5–36.5)
MCV RBC AUTO: 88 FL (ref 78–100)
MONOCYTES # BLD AUTO: 0.4 10E3/UL (ref 0–1.3)
MONOCYTES NFR BLD AUTO: 11 %
MUCOUS THREADS #/AREA URNS LPF: PRESENT /LPF
NEUTROPHILS # BLD AUTO: 2.4 10E3/UL (ref 1.6–8.3)
NEUTROPHILS NFR BLD AUTO: 64 %
NITRATE UR QL: NEGATIVE
NRBC # BLD AUTO: 0 10E3/UL
NRBC BLD AUTO-RTO: 0 /100
PH UR STRIP: 6 [PH] (ref 5–7)
PLATELET # BLD AUTO: 179 10E3/UL (ref 150–450)
POTASSIUM BLD-SCNC: 3.5 MMOL/L (ref 3.4–5.3)
PROT SERPL-MCNC: 8 G/DL (ref 6.8–8.8)
RBC # BLD AUTO: 3.14 10E6/UL (ref 4.4–5.9)
RBC URINE: 2 /HPF
SODIUM SERPL-SCNC: 134 MMOL/L (ref 133–144)
SP GR UR STRIP: 1.02 (ref 1–1.03)
SPECIMEN EXPIRATION DATE: NORMAL
SQUAMOUS EPITHELIAL: <1 /HPF
UROBILINOGEN UR STRIP-MCNC: NORMAL MG/DL
WBC # BLD AUTO: 3.7 10E3/UL (ref 4–11)
WBC URINE: 1 /HPF

## 2021-11-11 PROCEDURE — 85730 THROMBOPLASTIN TIME PARTIAL: CPT | Performed by: EMERGENCY MEDICINE

## 2021-11-11 PROCEDURE — 99285 EMERGENCY DEPT VISIT HI MDM: CPT | Mod: 25

## 2021-11-11 PROCEDURE — 250N000013 HC RX MED GY IP 250 OP 250 PS 637: Performed by: STUDENT IN AN ORGANIZED HEALTH CARE EDUCATION/TRAINING PROGRAM

## 2021-11-11 PROCEDURE — 272N000001 HC OR GENERAL SUPPLY STERILE: Performed by: SURGERY

## 2021-11-11 PROCEDURE — 250N000011 HC RX IP 250 OP 636: Performed by: ANESTHESIOLOGY

## 2021-11-11 PROCEDURE — 250N000009 HC RX 250: Performed by: NURSE ANESTHETIST, CERTIFIED REGISTERED

## 2021-11-11 PROCEDURE — 96376 TX/PRO/DX INJ SAME DRUG ADON: CPT | Mod: 59

## 2021-11-11 PROCEDURE — 99156 MOD SED OTH PHYS/QHP 5/>YRS: CPT | Performed by: EMERGENCY MEDICINE

## 2021-11-11 PROCEDURE — 258N000003 HC RX IP 258 OP 636

## 2021-11-11 PROCEDURE — 88302 TISSUE EXAM BY PATHOLOGIST: CPT | Mod: TC | Performed by: SURGERY

## 2021-11-11 PROCEDURE — 258N000003 HC RX IP 258 OP 636: Performed by: EMERGENCY MEDICINE

## 2021-11-11 PROCEDURE — 258N000003 HC RX IP 258 OP 636: Performed by: ANESTHESIOLOGY

## 2021-11-11 PROCEDURE — 88302 TISSUE EXAM BY PATHOLOGIST: CPT | Mod: 26 | Performed by: PATHOLOGY

## 2021-11-11 PROCEDURE — 360N000075 HC SURGERY LEVEL 2, PER MIN: Performed by: SURGERY

## 2021-11-11 PROCEDURE — 96375 TX/PRO/DX INJ NEW DRUG ADDON: CPT | Mod: 59

## 2021-11-11 PROCEDURE — 87205 SMEAR GRAM STAIN: CPT | Performed by: SURGERY

## 2021-11-11 PROCEDURE — 250N000025 HC SEVOFLURANE, PER MIN: Performed by: SURGERY

## 2021-11-11 PROCEDURE — 83690 ASSAY OF LIPASE: CPT | Performed by: EMERGENCY MEDICINE

## 2021-11-11 PROCEDURE — 87070 CULTURE OTHR SPECIMN AEROBIC: CPT | Performed by: SURGERY

## 2021-11-11 PROCEDURE — 710N000010 HC RECOVERY PHASE 1, LEVEL 2, PER MIN: Performed by: SURGERY

## 2021-11-11 PROCEDURE — 250N000011 HC RX IP 250 OP 636: Performed by: EMERGENCY MEDICINE

## 2021-11-11 PROCEDURE — 250N000011 HC RX IP 250 OP 636: Performed by: NURSE ANESTHETIST, CERTIFIED REGISTERED

## 2021-11-11 PROCEDURE — 81001 URINALYSIS AUTO W/SCOPE: CPT | Performed by: EMERGENCY MEDICINE

## 2021-11-11 PROCEDURE — 82040 ASSAY OF SERUM ALBUMIN: CPT | Performed by: EMERGENCY MEDICINE

## 2021-11-11 PROCEDURE — 36415 COLL VENOUS BLD VENIPUNCTURE: CPT | Performed by: EMERGENCY MEDICINE

## 2021-11-11 PROCEDURE — 96374 THER/PROPH/DIAG INJ IV PUSH: CPT | Mod: 59

## 2021-11-11 PROCEDURE — 85610 PROTHROMBIN TIME: CPT | Performed by: EMERGENCY MEDICINE

## 2021-11-11 PROCEDURE — 99157 MOD SED OTHER PHYS/QHP EA: CPT | Performed by: EMERGENCY MEDICINE

## 2021-11-11 PROCEDURE — 96361 HYDRATE IV INFUSION ADD-ON: CPT

## 2021-11-11 PROCEDURE — 370N000017 HC ANESTHESIA TECHNICAL FEE, PER MIN: Performed by: SURGERY

## 2021-11-11 PROCEDURE — 87075 CULTR BACTERIA EXCEPT BLOOD: CPT | Performed by: SURGERY

## 2021-11-11 PROCEDURE — 250N000011 HC RX IP 250 OP 636

## 2021-11-11 PROCEDURE — 82962 GLUCOSE BLOOD TEST: CPT

## 2021-11-11 PROCEDURE — 250N000024 HC ISOFLURANE, PER MIN: Performed by: SURGERY

## 2021-11-11 PROCEDURE — 85025 COMPLETE CBC W/AUTO DIFF WBC: CPT | Performed by: EMERGENCY MEDICINE

## 2021-11-11 PROCEDURE — 99285 EMERGENCY DEPT VISIT HI MDM: CPT | Mod: 25 | Performed by: EMERGENCY MEDICINE

## 2021-11-11 PROCEDURE — 83605 ASSAY OF LACTIC ACID: CPT | Performed by: EMERGENCY MEDICINE

## 2021-11-11 RX ORDER — ONDANSETRON 4 MG/1
4 TABLET, ORALLY DISINTEGRATING ORAL EVERY 30 MIN PRN
Status: DISCONTINUED | OUTPATIENT
Start: 2021-11-11 | End: 2021-11-11 | Stop reason: HOSPADM

## 2021-11-11 RX ORDER — ONDANSETRON 4 MG/1
4 TABLET, ORALLY DISINTEGRATING ORAL EVERY 6 HOURS PRN
Status: CANCELLED | OUTPATIENT
Start: 2021-11-11

## 2021-11-11 RX ORDER — SODIUM CHLORIDE, SODIUM LACTATE, POTASSIUM CHLORIDE, CALCIUM CHLORIDE 600; 310; 30; 20 MG/100ML; MG/100ML; MG/100ML; MG/100ML
INJECTION, SOLUTION INTRAVENOUS CONTINUOUS
Status: DISCONTINUED | OUTPATIENT
Start: 2021-11-11 | End: 2021-11-11 | Stop reason: HOSPADM

## 2021-11-11 RX ORDER — PROPOFOL 10 MG/ML
INJECTION, EMULSION INTRAVENOUS PRN
Status: DISCONTINUED | OUTPATIENT
Start: 2021-11-11 | End: 2021-11-11

## 2021-11-11 RX ORDER — ONDANSETRON 2 MG/ML
INJECTION INTRAMUSCULAR; INTRAVENOUS PRN
Status: DISCONTINUED | OUTPATIENT
Start: 2021-11-11 | End: 2021-11-11

## 2021-11-11 RX ORDER — OXYCODONE HYDROCHLORIDE 5 MG/1
5 TABLET ORAL EVERY 4 HOURS PRN
Status: DISCONTINUED | OUTPATIENT
Start: 2021-11-11 | End: 2021-11-11 | Stop reason: HOSPADM

## 2021-11-11 RX ORDER — BISACODYL 10 MG
10 SUPPOSITORY, RECTAL RECTAL DAILY PRN
Status: CANCELLED | OUTPATIENT
Start: 2021-11-11

## 2021-11-11 RX ORDER — FENTANYL CITRATE 50 UG/ML
25 INJECTION, SOLUTION INTRAMUSCULAR; INTRAVENOUS ONCE
Status: COMPLETED | OUTPATIENT
Start: 2021-11-11 | End: 2021-11-11

## 2021-11-11 RX ORDER — DEXAMETHASONE SODIUM PHOSPHATE 4 MG/ML
INJECTION, SOLUTION INTRA-ARTICULAR; INTRALESIONAL; INTRAMUSCULAR; INTRAVENOUS; SOFT TISSUE PRN
Status: DISCONTINUED | OUTPATIENT
Start: 2021-11-11 | End: 2021-11-11

## 2021-11-11 RX ORDER — PROPOFOL 10 MG/ML
150 INJECTION, EMULSION INTRAVENOUS ONCE
Status: COMPLETED | OUTPATIENT
Start: 2021-11-11 | End: 2021-11-11

## 2021-11-11 RX ORDER — CEFOTETAN DISODIUM 1 G/10ML
1 INJECTION, POWDER, FOR SOLUTION INTRAMUSCULAR; INTRAVENOUS ONCE
Status: DISCONTINUED | OUTPATIENT
Start: 2021-11-11 | End: 2021-11-12 | Stop reason: HOSPADM

## 2021-11-11 RX ORDER — FENTANYL CITRATE 50 UG/ML
50 INJECTION, SOLUTION INTRAMUSCULAR; INTRAVENOUS ONCE
Status: COMPLETED | OUTPATIENT
Start: 2021-11-11 | End: 2021-11-11

## 2021-11-11 RX ORDER — FENTANYL CITRATE 50 UG/ML
25 INJECTION, SOLUTION INTRAMUSCULAR; INTRAVENOUS EVERY 5 MIN PRN
Status: DISCONTINUED | OUTPATIENT
Start: 2021-11-11 | End: 2021-11-11 | Stop reason: HOSPADM

## 2021-11-11 RX ORDER — HYDROMORPHONE HCL IN WATER/PF 6 MG/30 ML
0.2 PATIENT CONTROLLED ANALGESIA SYRINGE INTRAVENOUS EVERY 5 MIN PRN
Status: DISCONTINUED | OUTPATIENT
Start: 2021-11-11 | End: 2021-11-11 | Stop reason: HOSPADM

## 2021-11-11 RX ORDER — LIDOCAINE HYDROCHLORIDE 20 MG/ML
INJECTION, SOLUTION INFILTRATION; PERINEURAL PRN
Status: DISCONTINUED | OUTPATIENT
Start: 2021-11-11 | End: 2021-11-11

## 2021-11-11 RX ORDER — HYDROXYZINE HYDROCHLORIDE 25 MG/1
25 TABLET, FILM COATED ORAL EVERY 6 HOURS PRN
Status: CANCELLED | OUTPATIENT
Start: 2021-11-11

## 2021-11-11 RX ORDER — PROPOFOL 10 MG/ML
INJECTION, EMULSION INTRAVENOUS
Status: DISCONTINUED
Start: 2021-11-11 | End: 2021-11-11 | Stop reason: HOSPADM

## 2021-11-11 RX ORDER — IBUPROFEN 600 MG/1
600 TABLET, FILM COATED ORAL EVERY 6 HOURS PRN
Status: CANCELLED | OUTPATIENT
Start: 2021-11-11

## 2021-11-11 RX ORDER — ONDANSETRON 2 MG/ML
4 INJECTION INTRAMUSCULAR; INTRAVENOUS EVERY 30 MIN PRN
Status: DISCONTINUED | OUTPATIENT
Start: 2021-11-11 | End: 2021-11-11 | Stop reason: HOSPADM

## 2021-11-11 RX ORDER — OXYCODONE HYDROCHLORIDE 5 MG/1
5 TABLET ORAL EVERY 4 HOURS PRN
Status: DISCONTINUED | OUTPATIENT
Start: 2021-11-11 | End: 2021-11-12 | Stop reason: HOSPADM

## 2021-11-11 RX ORDER — CIPROFLOXACIN 500 MG/1
500 TABLET, FILM COATED ORAL DAILY
Status: DISCONTINUED | OUTPATIENT
Start: 2021-11-12 | End: 2021-11-12 | Stop reason: HOSPADM

## 2021-11-11 RX ORDER — ONDANSETRON 2 MG/ML
4 INJECTION INTRAMUSCULAR; INTRAVENOUS ONCE
Status: DISCONTINUED | OUTPATIENT
Start: 2021-11-11 | End: 2021-11-12 | Stop reason: HOSPADM

## 2021-11-11 RX ORDER — SODIUM CHLORIDE, SODIUM GLUCONATE, SODIUM ACETATE, POTASSIUM CHLORIDE AND MAGNESIUM CHLORIDE 526; 502; 368; 37; 30 MG/100ML; MG/100ML; MG/100ML; MG/100ML; MG/100ML
INJECTION, SOLUTION INTRAVENOUS CONTINUOUS PRN
Status: DISCONTINUED | OUTPATIENT
Start: 2021-11-11 | End: 2021-11-11

## 2021-11-11 RX ORDER — MAGNESIUM OXIDE 400 MG/1
400 TABLET ORAL DAILY
Status: DISCONTINUED | OUTPATIENT
Start: 2021-11-12 | End: 2021-11-12 | Stop reason: HOSPADM

## 2021-11-11 RX ORDER — LORAZEPAM 2 MG/ML
INJECTION INTRAMUSCULAR
Status: COMPLETED
Start: 2021-11-11 | End: 2021-11-11

## 2021-11-11 RX ORDER — FUROSEMIDE 80 MG
80 TABLET ORAL
Status: DISCONTINUED | OUTPATIENT
Start: 2021-11-12 | End: 2021-11-12 | Stop reason: HOSPADM

## 2021-11-11 RX ORDER — OXYCODONE HYDROCHLORIDE 10 MG/1
10 TABLET ORAL EVERY 4 HOURS PRN
Status: DISCONTINUED | OUTPATIENT
Start: 2021-11-11 | End: 2021-11-12 | Stop reason: HOSPADM

## 2021-11-11 RX ORDER — LIDOCAINE 40 MG/G
CREAM TOPICAL
Status: CANCELLED | OUTPATIENT
Start: 2021-11-11

## 2021-11-11 RX ORDER — FENTANYL CITRATE 50 UG/ML
INJECTION, SOLUTION INTRAMUSCULAR; INTRAVENOUS PRN
Status: DISCONTINUED | OUTPATIENT
Start: 2021-11-11 | End: 2021-11-11

## 2021-11-11 RX ORDER — POLYETHYLENE GLYCOL 3350 17 G/17G
17 POWDER, FOR SOLUTION ORAL DAILY
Status: CANCELLED | OUTPATIENT
Start: 2021-11-12

## 2021-11-11 RX ORDER — AMOXICILLIN 250 MG
1 CAPSULE ORAL 2 TIMES DAILY
Status: CANCELLED | OUTPATIENT
Start: 2021-11-11

## 2021-11-11 RX ORDER — PROCHLORPERAZINE MALEATE 10 MG
10 TABLET ORAL EVERY 6 HOURS PRN
Status: CANCELLED | OUTPATIENT
Start: 2021-11-11

## 2021-11-11 RX ORDER — ONDANSETRON 2 MG/ML
4 INJECTION INTRAMUSCULAR; INTRAVENOUS EVERY 6 HOURS PRN
Status: CANCELLED | OUTPATIENT
Start: 2021-11-11

## 2021-11-11 RX ORDER — FENTANYL CITRATE 50 UG/ML
100 INJECTION, SOLUTION INTRAMUSCULAR; INTRAVENOUS ONCE
Status: COMPLETED | OUTPATIENT
Start: 2021-11-11 | End: 2021-11-11

## 2021-11-11 RX ADMIN — PROPOFOL 50 MG: 10 INJECTION, EMULSION INTRAVENOUS at 13:42

## 2021-11-11 RX ADMIN — PROPOFOL 25 MG: 10 INJECTION, EMULSION INTRAVENOUS at 14:05

## 2021-11-11 RX ADMIN — PROPOFOL 25 MG: 10 INJECTION, EMULSION INTRAVENOUS at 13:50

## 2021-11-11 RX ADMIN — PROPOFOL 100 MG: 10 INJECTION, EMULSION INTRAVENOUS at 15:44

## 2021-11-11 RX ADMIN — PROPOFOL 50 MG: 10 INJECTION, EMULSION INTRAVENOUS at 13:45

## 2021-11-11 RX ADMIN — ONDANSETRON 4 MG: 2 INJECTION INTRAMUSCULAR; INTRAVENOUS at 16:35

## 2021-11-11 RX ADMIN — SUGAMMADEX 200 MG: 100 INJECTION, SOLUTION INTRAVENOUS at 16:45

## 2021-11-11 RX ADMIN — FENTANYL CITRATE 25 MCG: 50 INJECTION, SOLUTION INTRAMUSCULAR; INTRAVENOUS at 17:14

## 2021-11-11 RX ADMIN — FENTANYL CITRATE 25 MCG: 50 INJECTION, SOLUTION INTRAMUSCULAR; INTRAVENOUS at 17:09

## 2021-11-11 RX ADMIN — LIDOCAINE HYDROCHLORIDE 60 MG: 20 INJECTION, SOLUTION INFILTRATION; PERINEURAL at 15:44

## 2021-11-11 RX ADMIN — DEXAMETHASONE SODIUM PHOSPHATE 6 MG: 4 INJECTION, SOLUTION INTRA-ARTICULAR; INTRALESIONAL; INTRAMUSCULAR; INTRAVENOUS; SOFT TISSUE at 15:54

## 2021-11-11 RX ADMIN — FENTANYL CITRATE 25 MCG: 50 INJECTION, SOLUTION INTRAMUSCULAR; INTRAVENOUS at 17:01

## 2021-11-11 RX ADMIN — LORAZEPAM 0.5 MG: 2 INJECTION INTRAMUSCULAR; INTRAVENOUS at 12:43

## 2021-11-11 RX ADMIN — FENTANYL CITRATE 100 MCG: 50 INJECTION INTRAMUSCULAR; INTRAVENOUS at 12:30

## 2021-11-11 RX ADMIN — FENTANYL CITRATE 25 MCG: 50 INJECTION INTRAMUSCULAR; INTRAVENOUS at 14:05

## 2021-11-11 RX ADMIN — FENTANYL CITRATE 100 MCG: 50 INJECTION, SOLUTION INTRAMUSCULAR; INTRAVENOUS at 15:44

## 2021-11-11 RX ADMIN — FENTANYL CITRATE 50 MCG: 50 INJECTION, SOLUTION INTRAMUSCULAR; INTRAVENOUS at 16:57

## 2021-11-11 RX ADMIN — SODIUM CHLORIDE 1000 ML: 9 INJECTION, SOLUTION INTRAVENOUS at 13:39

## 2021-11-11 RX ADMIN — FENTANYL CITRATE 50 MCG: 50 INJECTION, SOLUTION INTRAMUSCULAR; INTRAVENOUS at 14:21

## 2021-11-11 RX ADMIN — SODIUM CHLORIDE, POTASSIUM CHLORIDE, SODIUM LACTATE AND CALCIUM CHLORIDE: 600; 310; 30; 20 INJECTION, SOLUTION INTRAVENOUS at 17:35

## 2021-11-11 RX ADMIN — PROPOFOL 50 MG: 10 INJECTION, EMULSION INTRAVENOUS at 14:01

## 2021-11-11 RX ADMIN — PROPOFOL 25 MG: 10 INJECTION, EMULSION INTRAVENOUS at 13:59

## 2021-11-11 RX ADMIN — ROCURONIUM BROMIDE 50 MG: 50 INJECTION, SOLUTION INTRAVENOUS at 15:50

## 2021-11-11 RX ADMIN — FENTANYL CITRATE 50 MCG: 50 INJECTION, SOLUTION INTRAMUSCULAR; INTRAVENOUS at 16:17

## 2021-11-11 RX ADMIN — HYDROMORPHONE HYDROCHLORIDE 0.2 MG: 0.2 INJECTION, SOLUTION INTRAMUSCULAR; INTRAVENOUS; SUBCUTANEOUS at 18:15

## 2021-11-11 RX ADMIN — FENTANYL CITRATE 50 MCG: 50 INJECTION INTRAMUSCULAR; INTRAVENOUS at 13:33

## 2021-11-11 RX ADMIN — HYDROMORPHONE HYDROCHLORIDE 0.4 MG: 0.2 INJECTION, SOLUTION INTRAMUSCULAR; INTRAVENOUS; SUBCUTANEOUS at 17:47

## 2021-11-11 RX ADMIN — HYDROMORPHONE HYDROCHLORIDE 0.2 MG: 0.2 INJECTION, SOLUTION INTRAMUSCULAR; INTRAVENOUS; SUBCUTANEOUS at 17:27

## 2021-11-11 RX ADMIN — FENTANYL CITRATE 25 MCG: 50 INJECTION, SOLUTION INTRAMUSCULAR; INTRAVENOUS at 17:20

## 2021-11-11 RX ADMIN — PROPOFOL 50 MG: 10 INJECTION, EMULSION INTRAVENOUS at 13:54

## 2021-11-11 RX ADMIN — FENTANYL CITRATE 25 MCG: 50 INJECTION INTRAMUSCULAR; INTRAVENOUS at 13:48

## 2021-11-11 RX ADMIN — OXYCODONE HYDROCHLORIDE 10 MG: 10 TABLET ORAL at 18:14

## 2021-11-11 RX ADMIN — SODIUM CHLORIDE, SODIUM GLUCONATE, SODIUM ACETATE, POTASSIUM CHLORIDE AND MAGNESIUM CHLORIDE: 526; 502; 368; 37; 30 INJECTION, SOLUTION INTRAVENOUS at 15:37

## 2021-11-11 RX ADMIN — OXYCODONE HYDROCHLORIDE 10 MG: 10 TABLET ORAL at 22:15

## 2021-11-11 RX ADMIN — HYDROMORPHONE HYDROCHLORIDE 0.2 MG: 0.2 INJECTION, SOLUTION INTRAMUSCULAR; INTRAVENOUS; SUBCUTANEOUS at 17:34

## 2021-11-11 RX ADMIN — PROCHLORPERAZINE EDISYLATE 5 MG: 5 INJECTION INTRAMUSCULAR; INTRAVENOUS at 13:33

## 2021-11-11 RX ADMIN — HYDROMORPHONE HYDROCHLORIDE 0.2 MG: 0.2 INJECTION, SOLUTION INTRAMUSCULAR; INTRAVENOUS; SUBCUTANEOUS at 18:02

## 2021-11-11 RX ADMIN — PROPOFOL 25 MG: 10 INJECTION, EMULSION INTRAVENOUS at 13:58

## 2021-11-11 RX ADMIN — HYDROMORPHONE HYDROCHLORIDE 0.4 MG: 0.2 INJECTION, SOLUTION INTRAMUSCULAR; INTRAVENOUS; SUBCUTANEOUS at 17:38

## 2021-11-11 ASSESSMENT — ACTIVITIES OF DAILY LIVING (ADL)
WEAR_GLASSES_OR_BLIND: NO
DRESSING/BATHING: BATHING DIFFICULTY, ASSISTANCE 1 PERSON
DRESSING/BATHING_DIFFICULTY: OTHER (SEE COMMENTS)
TOILETING_ISSUES: NO
CONCENTRATING,_REMEMBERING_OR_MAKING_DECISIONS_DIFFICULTY: NO
FALL_HISTORY_WITHIN_LAST_SIX_MONTHS: NO
DIFFICULTY_EATING/SWALLOWING: OTHER (SEE COMMENTS)
WALKING_OR_CLIMBING_STAIRS_DIFFICULTY: NO
DIFFICULTY_COMMUNICATING: NO
DOING_ERRANDS_INDEPENDENTLY_DIFFICULTY: NO

## 2021-11-11 ASSESSMENT — MIFFLIN-ST. JEOR
SCORE: 1899.62
SCORE: 1930.91

## 2021-11-11 ASSESSMENT — ENCOUNTER SYMPTOMS
DIARRHEA: 0
ABDOMINAL PAIN: 1
VOMITING: 0
NAUSEA: 1

## 2021-11-11 NOTE — BRIEF OP NOTE
[facility]    Brief Operative Note    Pre-operative diagnosis: Hernia, umbilical [K42.9]  Post-operative diagnosis Same as pre-operative diagnosis    Procedure: Procedure(s):  HERNIORRHAPHY, UMBILICAL, OPEN  Surgeon: Surgeon(s) and Role:     * Costa Malave MD - Primary     * Maciej Love MD - Resident - Assisting  Anesthesia: General   Estimated Blood Loss: 30cc    Drains: None  Specimens:   ID Type Source Tests Collected by Time Destination   1 : Umbilical hernia sac Tissue Hernia Sac, Umbilical SURGICAL PATHOLOGY EXAM Costa Malave MD 11/11/2021  4:23 PM    A :  Peritoneal Fluid Peritoneum ANAEROBIC BACTERIAL CULTURE ROUTINE, GRAM STAIN, AEROBIC BACTERIAL CULTURE ROUTINE Costa Malave MD 11/11/2021  4:12 PM      Findings:   None.Procedure as stated, reduced hernia and primary repair of defect.   Complications: None.  Implants: * No implants in log *      Plan:    - Admit to surgery under observation  - CLD  - PTA meds  - PRN pain meds  - PRN anti-emetics

## 2021-11-11 NOTE — ANESTHESIA PROCEDURE NOTES
Airway       Patient location during procedure: OR       Procedure Start/Stop Times: 11/11/2021 3:46 PM  Staff -        CRNA: Landy Russell APRN CRNA       Performed By: CRNA  Consent for Airway        Urgency: elective  Indications and Patient Condition       Indications for airway management: maurice-procedural       Induction type:intravenous       Mask difficulty assessment: 1 - vent by mask    Final Airway Details       Final airway type: endotracheal airway       Successful airway: ETT - single  Endotracheal Airway Details        ETT size (mm): 7.5       Cuffed: yes       Successful intubation technique: direct laryngoscopy       DL Blade Type: Gudino 2       Grade View of Cords: 1       Secured at (cm): 22    Post intubation assessment        Placement verified by: capnometry, equal breath sounds and chest rise        Number of attempts at approach: 1       Ease of procedure: easy       Dentition: Intact

## 2021-11-11 NOTE — ANESTHESIA PREPROCEDURE EVALUATION
Anesthesia Pre-Procedure Evaluation    Patient: Florian Nowak   MRN: [id number] : 1980        Preoperative Diagnosis: Hernia, umbilical [K42.9]    Procedure : Procedure(s):  HERNIORRHAPHY, UMBILICAL, OPEN, possible bowel resection          Past Medical History:   Diagnosis Date     Diabetes (H)      History of blood transfusion      Hypertension       History reviewed. No pertinent surgical history.   Allergies   Allergen Reactions     Ketorolac Tromethamine Other (See Comments)      Social History     Tobacco Use     Smoking status: Former Smoker     Smokeless tobacco: Never Used     Tobacco comment: Hylete   Substance Use Topics     Alcohol use: Not Currently     Comment: Quit ETOH 10/2020      Wt Readings from Last 1 Encounters:   21 99.8 kg (220 lb)        Anesthesia Evaluation            ROS/MED HX  ENT/Pulmonary:     (+) Intermittent, asthma     Neurologic:  - neg neurologic ROS     Cardiovascular:     (+) Dyslipidemia hypertension-----    METS/Exercise Tolerance:     Hematologic:     (+) anemia,     Musculoskeletal:  - neg musculoskeletal ROS     GI/Hepatic: Comment: Alcoholic cirrhosis    (+) liver disease,     Renal/Genitourinary:     (+) renal disease, type: ARF,     Endo:     (+) type II DM,     Psychiatric/Substance Use:     (+) psychiatric history depression     Infectious Disease:  - neg infectious disease ROS     Malignancy:  - neg malignancy ROS     Other: Comment: Incarcerated umbilical hernia           Physical Exam    Airway  airway exam normal           Respiratory Devices and Support         Dental  no notable dental history         Cardiovascular   cardiovascular exam normal          Pulmonary   pulmonary exam normal                OUTSIDE LABS:  CBC:   Lab Results   Component Value Date    WBC 3.7 (L) 2021    WBC 8.3 09/10/2021    HGB 8.7 (L) 2021    HGB 8.2 (L) 2021    HCT 27.5 (L) 2021    HCT 24.3 (L) 09/10/2021     2021      09/10/2021     BMP:   Lab Results   Component Value Date     11/11/2021     11/05/2021    POTASSIUM 3.5 11/11/2021    POTASSIUM 3.9 11/05/2021    CHLORIDE 104 11/11/2021    CHLORIDE 106 11/05/2021    CO2 25 11/11/2021    CO2 23 11/05/2021    BUN 16 11/11/2021    BUN 14 11/05/2021    CR 0.82 11/11/2021    CR 0.82 11/05/2021     (H) 11/11/2021    GLC 75 11/05/2021     COAGS:   Lab Results   Component Value Date    PTT 36 11/11/2021    INR 1.27 (H) 11/11/2021     POC: No results found for: BGM, HCG, HCGS  HEPATIC:   Lab Results   Component Value Date    ALBUMIN 2.7 (L) 11/11/2021    PROTTOTAL 8.0 11/11/2021    ALT 17 11/11/2021    AST 33 11/11/2021    ALKPHOS 122 11/11/2021    BILITOTAL 1.3 11/11/2021     OTHER:   Lab Results   Component Value Date    LACT 2.4 (H) 11/11/2021    SANJANA 8.9 11/11/2021    LIPASE 237 11/11/2021       Anesthesia Plan    ASA Status:  3, emergent    NPO Status:  ELEVATED Aspiration Risk/Unknown    Anesthesia Type: General.     - Airway: ETT   Induction: RSI, Intravenous.   Maintenance: Balanced.   Techniques and Equipment:     - Lines/Monitors: 2nd IV     Consents    Anesthesia Plan(s) and associated risks, benefits, and realistic alternatives discussed. Questions answered and patient/representative(s) expressed understanding.     - Discussed with:  Patient      - Extended Intubation/Ventilatory Support Discussed: No.      - Patient is DNR/DNI Status: No    Use of blood products discussed: No .     Postoperative Care    Pain management: IV analgesics, Multi-modal analgesia.   PONV prophylaxis: Ondansetron (or other 5HT-3), Dexamethasone or Solumedrol     Comments:                Lico Gonzalez MD

## 2021-11-11 NOTE — ED PROVIDER NOTES
[facility] [place] [place] ([facility])    11/11/21     ED 20 12:07 PM    History     Chief Complaint   Patient presents with     Abdominal Pain     The history is provided by the patient and medical records.     Florian Nowak is a 41 year old male with history of KANG, alcohol use disorder with ascites status post TIPS procedure on 9/21/21 who presents with umbilical hernia that will not reduce.  He was at [facility] when suddenly his hernia bulged out.  He was unable to reduce it himself, is normally able to.  This became increasingly painful and so presents for evaluation.  He states that this is the most severe his hernia pain has ever been.  He did have prior ED visit in September when his hernia would not reduce, states that his pain today is even more severe than that time.  There is some subtle discoloration over his hernia at baseline. He has nausea and urge to vomit. Last bowel movement was last night, normal. Hasn't passed any flatus today. No fevers.  His last paracentesis was in 11/1/21. No reaccumulation of ascites since then.  He used to have frequent paracentesis but is now doing them once every 3 weeks.  He states that his TIPS procedure has gone quite well.  His LFTs have been improving and his MELD score has improved.  He last ate at 10 AM today.      Past Medical History  Past Medical History:   Diagnosis Date     Diabetes (H)      History of blood transfusion      Hypertension      History reviewed. No pertinent surgical history.  ciprofloxacin (CIPRO) 500 MG tablet  ciprofloxacin (CIPRO) 500 MG tablet  ferrous sulfate (FEROSUL) 325 (65 Fe) MG tablet  fish oil-omega-3 fatty acids 1000 MG capsule  furosemide (LASIX) 40 MG tablet  magnesium 250 MG tablet  ondansetron (ZOFRAN) 4 MG tablet  spironolactone (ALDACTONE) 25 MG tablet  vitamin D3 (CHOLECALCIFEROL) 250 mcg (77781 units) capsule      Allergies   Allergen Reactions     Ketorolac Tromethamine Other (See Comments)     Family  History  No family history on file.  Social History   Social History     Tobacco Use     Smoking status: Former Smoker     Smokeless tobacco: Never Used     Tobacco comment: College   Substance Use Topics     Alcohol use: Not Currently     Comment: Quit ETOH 10/2020     Drug use: Not Currently      Past medical history, past surgical history, medications, allergies, family history, and social history were reviewed with the patient. No additional pertinent items.       Review of Systems   Gastrointestinal: Positive for abdominal pain and nausea. Negative for diarrhea and vomiting.     A complete review of systems was performed with pertinent positives and negatives noted in the HPI, and all other systems negative.    Physical Exam   BP: 114/67  Pulse: 112  Temp: 97.5  F (36.4  C)  Resp: 24  Height: 182.9 cm (6')  Weight: 99.8 kg (220 lb)  SpO2: 99 %     Physical Exam  Vitals reviewed.   Constitutional:       Appearance: He is well-developed.      Comments: Uncomfortable/painful   HENT:      Head: Normocephalic and atraumatic.   Eyes:      Extraocular Movements: Extraocular movements intact.      Conjunctiva/sclera: Conjunctivae normal.      Pupils: Pupils are equal, round, and reactive to light.   Cardiovascular:      Rate and Rhythm: Normal rate and regular rhythm.      Pulses: Normal pulses.      Heart sounds: Normal heart sounds. No murmur heard.      Pulmonary:      Effort: Pulmonary effort is normal. No respiratory distress.      Breath sounds: Normal breath sounds. No stridor. No wheezing or rales.   Abdominal:      General: Bowel sounds are normal. There is no distension.      Palpations: Abdomen is soft. There is no mass.      Tenderness: There is abdominal tenderness (over hernia, at baseline has a darker skin color per patient and wife). There is no guarding or rebound.      Hernia: A hernia (umbilical, unable to be reduced) is present.      Comments: soft, tender umbilical bulge, no peritoneal signs. No  distention or fluid wave appreciated.   Musculoskeletal:         General: Normal range of motion.      Cervical back: Normal range of motion and neck supple.   Skin:     General: Skin is warm and dry.      Capillary Refill: Capillary refill takes less than 2 seconds.      Findings: No rash.   Neurological:      General: No focal deficit present.      Mental Status: He is alert and oriented to person, place, and time.      GCS: GCS eye subscore is 4. GCS verbal subscore is 5. GCS motor subscore is 6.      Cranial Nerves: No cranial nerve deficit.      Sensory: No sensory deficit.      Motor: No weakness or abnormal muscle tone.   Psychiatric:         Mood and Affect: Mood normal.         ED Course      [facility]    Procedure: Procedural sedation for umbilical hernia closed reduction    Date/Time: 12/22/2021 3:48 AM  Performed by: Janett Ross MD  Authorized by: Janett Ross MD     Risks, benefits and alternatives discussed.    ED EVALUATION:      Assessment Time: 11/11/2021 12:30 PM      I have performed an Emergency Department Evaluation including taking a history and physical examination, this evaluation will be documented in the electronic medical record for this ED encounter.     Indication: incarcerated hernia    ASA Class: Class 3- Severe systemic disease, definite functional limitations    Mallampati: Grade 2- soft palate, base of uvula, tonsillar pillars, and portion of posterior pharyngeal wall visible    NPO Status: not NPO, emergent situation    UNIVERSAL PROTOCOL   Site Marked: NA  Prior Images Obtained and Reviewed:  NA  Required items: Required blood products, implants, devices and special equipment available    Patient identity confirmed:  Verbally with patient, arm band and hospital-assigned identification number  Patient was reevaluated immediately before administering moderate or deep sedation or anesthesia  Confirmation Checklist:   Patient's identity using two indicators, relevant allergies, procedure was appropriate and matched the consent or emergent situation and correct equipment/implants were available  Time out: Immediately prior to the procedure a time out was called    Universal Protocol: the Joint Commission Universal Protocol was followed    Preparation: Patient was prepped and draped in usual sterile fashion      SEDATION  Patient Sedated: Yes    Sedation Type:  Moderate (conscious) sedation  Sedation:  See MAR for details, propofol and fentanyl  Vital signs: Vital signs monitored during sedation      PROCEDURE  Describe Procedure: Sedation was maintained until the procedure was complete (had a total of 150 mcg of fentanyl and 300 mg of propofol over extended sedation). The patient was monitored by staff until recovered. Unfortunately hernia was unable to be fully reduced.   Patient Tolerance:  Patient tolerated the procedure well with no immediate complications  Length of time physician/provider present for 1:1 monitoring during sedation: 30         The medical record was reviewed and interpreted.  Current labs reviewed and interpreted.  12:07 PM patient assessed in ED 20 by Dr. Ross.     Labs Ordered and Resulted from Time of ED Arrival to Time of ED Departure   COMPREHENSIVE METABOLIC PANEL - Abnormal       Result Value    Sodium 134      Potassium 3.5      Chloride 104      Carbon Dioxide (CO2) 25      Anion Gap 5      Urea Nitrogen 16      Creatinine 0.82      Calcium 8.9      Glucose 153 (*)     Alkaline Phosphatase 122      AST 33      ALT 17      Protein Total 8.0      Albumin 2.7 (*)     Bilirubin Total 1.3      GFR Estimate >90     LACTIC ACID WHOLE BLOOD - Abnormal    Lactic Acid 2.4 (*)    INR - Abnormal    INR 1.27 (*)    ROUTINE UA WITH MICROSCOPIC REFLEX TO CULTURE - Abnormal    Color Urine Light Yellow      Appearance Urine Clear      Glucose Urine Negative      Bilirubin Urine Negative      Ketones Urine  Negative      Specific Gravity Urine 1.016      Blood Urine Negative      pH Urine 6.0      Protein Albumin Urine Negative      Urobilinogen Urine Normal      Nitrite Urine Negative      Leukocyte Esterase Urine Negative      Mucus Urine Present (*)     RBC Urine 2      WBC Urine 1      Squamous Epithelials Urine <1      Hyaline Casts Urine 3 (*)    CBC WITH PLATELETS AND DIFFERENTIAL - Abnormal    WBC Count 3.7 (*)     RBC Count 3.14 (*)     Hemoglobin 8.7 (*)     Hematocrit 27.5 (*)     MCV 88      MCH 27.7      MCHC 31.6      RDW 13.8      Platelet Count 179      % Neutrophils 64      % Lymphocytes 20      % Monocytes 11      % Eosinophils 5      % Basophils 0      % Immature Granulocytes 0      NRBCs per 100 WBC 0      Absolute Neutrophils 2.4      Absolute Lymphocytes 0.7 (*)     Absolute Monocytes 0.4      Absolute Eosinophils 0.2      Absolute Basophils 0.0      Absolute Immature Granulocytes 0.0      Absolute NRBCs 0.0     LIPASE - Normal    Lipase 237     PARTIAL THROMBOPLASTIN TIME - Normal    aPTT 36     TYPE AND SCREEN, ADULT    ABO/RH(D) A POS      Antibody Screen Negative      SPECIMEN EXPIRATION DATE [id number]         Medications   fentaNYL (PF) (SUBLIMAZE) injection 100 mcg (100 mcg Intravenous Given 11/11/21 1230)   LORazepam (ATIVAN) 2 MG/ML injection (0.5 mg  Given 11/11/21 1243)   propofol (DIPRIVAN) injection 10 mg/mL vial (25 mg Intravenous Given 11/11/21 1405)   0.9% sodium chloride BOLUS (0 mLs Intravenous ED Infusing on Admission/transfer 11/11/21 1444)   fentaNYL (PF) (SUBLIMAZE) injection 50 mcg (50 mcg Intravenous Given 11/11/21 1333)   prochlorperazine (COMPAZINE) injection 5 mg (5 mg Intravenous Given 11/11/21 1333)   fentaNYL (PF) (SUBLIMAZE) injection 25 mcg (25 mcg Intravenous Given 11/11/21 1348)   fentaNYL (PF) (SUBLIMAZE) injection 25 mcg (25 mcg Intravenous Given 11/11/21 1405)   fentaNYL (PF) (SUBLIMAZE) injection 50 mcg (50 mcg Intravenous Given 11/11/21 1421)   influenza  quadrivalent (PF) vacc (FLUZONE) injection 0.5 mL (0.5 mLs Intramuscular Given 11/12/21 1107)        Assessments & Plan (with Medical Decision Making)   Patient presents with incarcerated umbilical hernia.  He has history of liver cirrhosis with recent TIPS procedure and thus no longer necessitating paracentesis.  He states that typically he has been able to reduce this hernia when it popped out related to his ascites but it has now been stuck out for about the last hour.  I did give the patient IV fentanyl and IV Ativan with some Compazine as well and attempted to reduce this myself at the bedside in Trendelenburg position with ice pack on this previous to the reduction.  I was able to partially reduce but this was still incarcerated.  Laboratory studies were obtained and I was planning to get a CT scan and immediately contacted the general surgery team.  They stated they would come over to the [facility] if we could perform procedural sedation to attempt reduction without going to the operating room as he does have a complex medical history.  They stated we would not require the CT scan.  Discussed that he does have a slightly elevated lactic acid of 2.4 which may also be related to his liver disease.    His white blood cell count is also 3.7 with a hemoglobin of 8.7.  Platelets are 179.  CMP is largely unremarkable with normal AST ALT alk phos and bilirubin.  INR is 1.27.  Lipase 237.  Type and screen was obtained.  Asymptomatic COVID-19 PCR was ordered.  I consented the patient for procedural sedation for closed reduction and he was in agreement with this plan.  Propofol was used for sedation with the addition of fentanyl dosing as this became a prolonged sedation while surgery tried to reduce the hernia.  He was properly sedated during the procedure and there were no complications.  Unfortunately they were unable to fully reduce the hernia as there was a very small defect.  The surgery team then emergently  contacted the [place] OR and EMS was emergently called for transport to the OR for open repair.  This was discussed with the patient's wife who was in agreement.  Patient was also coming out of sedation and understanding that he was being transferred for surgery as well.  IV fluids were going.  He was awake and alert on EMS arrival.  He was emergently transferred to the OR on the [facility] via EMS in stable condition.    I have reviewed the nursing notes. I have reviewed the findings, diagnosis, plan and need for follow up with the patient.    New Prescriptions    No medications on file       Final diagnoses:   Incarcerated umbilical hernia     I, Susan Toro, am serving as a trained medical scribe to document services personally performed by Janett Ross MD based on the provider's statements to me on November 11, 2021.  This document has been checked and approved by the attending provider.    I, Janett Ross MD, was physically present and have reviewed and verified the accuracy of this note documented by Susan Toro, medical scribe.      Janett Ross MD   [facility] EMERGENCY DEPARTMENT  11/11/2021     Janett Ross MD  12/22/21 0357

## 2021-11-11 NOTE — ANESTHESIA CARE TRANSFER NOTE
Patient: Folrian Nowak    Procedure: Procedure(s):  HERNIORRHAPHY, UMBILICAL, OPEN       Diagnosis: Hernia, umbilical [K42.9]  Diagnosis Additional Information: No value filed.    Anesthesia Type:   No value filed.     Note:    Oropharynx: spontaneously breathing  Level of Consciousness: drowsy  Oxygen Supplementation: nasal cannula    Independent Airway: airway patency satisfactory and stable  Dentition: dentition unchanged  Vital Signs Stable: post-procedure vital signs reviewed and stable  Report to RN Given: handoff report given  Patient transferred to: PACU    Handoff Report: Identifed the Patient, Identified the Reponsible Provider, Reviewed the pertinent medical history, Discussed the surgical course, Reviewed Intra-OP anesthesia mangement and issues during anesthesia, Set expectations for post-procedure period and Allowed opportunity for questions and acknowledgement of understanding      Vitals:  Vitals Value Taken Time   BP     Temp     Pulse     Resp     SpO2         Electronically Signed By: SHAR Jones CRNA  November 11, 2021  5:00 PM

## 2021-11-11 NOTE — CONSULTS
General Surgery Consult Note    Staff: Dr. Malave  Date: 11/11/2021   Consulted for: incarcerated umbilical hernia    Assessment/Plan:  41 year old male with PMHx of EtOH cirrhosis s/p TIPS presenting with long-standing umbilical hernia now non-reducible and including small bowel. MELD 15    - OR emergently for umbilical hernia repair, possible bowel resection  - COVID pending    Maciej Love MD  PGY-5 Surgery  ------------------------------------------  HPI:   Florian Nowak is a 41 year old male with PMHx of EtOH cirrhosis s/p TIPS who is being evaluated for umbilical hernia. Has a history of this hernia for which he feels like it has mostly been fluid secondary to his ascites. He has been able to reduce it himself previously. Earlier today, he was lifting something and felt a pop. The bulge has not reduced, and he was in considerable pain. No nausea or vomiting.     Attempted conscious sedation reduction at bedside in the ED which was not completely successful.     He did have a TIPS procedure in September.   ?  Review of Systems:  ROS: 10 point ROS neg other than the symptoms noted above in the HPI.    PMH:  Past Medical History:   Diagnosis Date     Diabetes (H)      History of blood transfusion      Hypertension        PSHx:  History reviewed. No pertinent surgical history.    Medications:  Current Facility-Administered Medications   Medication     propofol (DIPRIVAN) 200 MG/20ML injection     0.9% sodium chloride BOLUS     ondansetron (ZOFRAN) injection 4 mg     Current Outpatient Medications   Medication Sig     ciprofloxacin (CIPRO) 500 MG tablet Take 1 tablet (500 mg) by mouth daily     ciprofloxacin (CIPRO) 500 MG tablet Take 500 mg by mouth daily      ferrous sulfate (FEROSUL) 325 (65 Fe) MG tablet Take 325 mg by mouth daily (with breakfast)     fish oil-omega-3 fatty acids 1000 MG capsule Take 2 g by mouth daily     furosemide (LASIX) 40 MG tablet Take 2 tablets (80 mg) by mouth 2 times daily      magnesium 250 MG tablet Take 1 tablet by mouth daily     ondansetron (ZOFRAN) 4 MG tablet Take 1 tablet (4 mg) by mouth every 8 hours as needed for nausea     spironolactone (ALDACTONE) 25 MG tablet Take 3 tablets (75 mg) by mouth 2 times daily     vitamin D3 (CHOLECALCIFEROL) 250 mcg (82234 units) capsule Take 1 capsule by mouth daily       Allergies:   Allergies   Allergen Reactions     Ketorolac Tromethamine Other (See Comments)       SocHx:  Wife at bedside and supportive    FamHx:  No family history on file.    Physical Examination   /78 (BP Location: Right arm)   Pulse 88   Temp 97.5  F (36.4  C) (Oral)   Resp 11   Ht 1.829 m (6')   Wt 99.8 kg (220 lb)   SpO2 99%   BMI 29.84 kg/m    General: Awake and alert. NAD  Pulm: non-labored breathing on room air, no tachypnea   CV: RRR  Abdomen: soft, tender umbilical bulge, no peritoneal signs. No distention or fluid wave appreciated. Unable to reduce with sedation. Very narrow fascial defect.  Musculoskel/Extremities: no edema, erythema, tenderness   Skin: no rashes, no diaphoresis and skin color normal     Labs: Reviewed   Na 137  Tbili 1.3  Cr 0.82  INR 1.27    Imaging: none recent

## 2021-11-11 NOTE — ED NOTES
EMS present and transporting patient to [place] for emergent surgery. Wife at bedside and updated regarding plan. Wife will join patient on [place]. Patient alert and responding to commands. VSS, on 4L via NC. Ice applied to hernia. Fentanyl 150 mg total given along with Propofol 300 mg total given.

## 2021-11-11 NOTE — ED TRIAGE NOTES
Hx of cirrhosis and has a TIPS procedure 9/21. Hx of umbilical hernia and reports he has been able to reduce it until today.  Walking around [employer] when it popped out reports he was liftig but nothing heavy. + nausea, urinary urgency.  Denies fever diarrhea chest pain dyspnea

## 2021-11-12 VITALS
SYSTOLIC BLOOD PRESSURE: 125 MMHG | WEIGHT: 213.1 LBS | HEIGHT: 72 IN | BODY MASS INDEX: 28.86 KG/M2 | DIASTOLIC BLOOD PRESSURE: 71 MMHG | OXYGEN SATURATION: 97 % | RESPIRATION RATE: 16 BRPM | TEMPERATURE: 97.5 F | HEART RATE: 104 BPM

## 2021-11-12 LAB
ALBUMIN SERPL-MCNC: 2.2 G/DL (ref 3.4–5)
ALP SERPL-CCNC: 109 U/L (ref 40–150)
ALT SERPL W P-5'-P-CCNC: 14 U/L (ref 0–70)
ANION GAP SERPL CALCULATED.3IONS-SCNC: 7 MMOL/L (ref 3–14)
AST SERPL W P-5'-P-CCNC: 23 U/L (ref 0–45)
BILIRUB DIRECT SERPL-MCNC: 0.5 MG/DL (ref 0–0.2)
BILIRUB SERPL-MCNC: 1 MG/DL (ref 0.2–1.3)
BUN SERPL-MCNC: 16 MG/DL (ref 7–30)
CALCIUM SERPL-MCNC: 8.7 MG/DL (ref 8.5–10.1)
CHLORIDE BLD-SCNC: 109 MMOL/L (ref 94–109)
CO2 SERPL-SCNC: 21 MMOL/L (ref 20–32)
CREAT SERPL-MCNC: 0.66 MG/DL (ref 0.66–1.25)
ERYTHROCYTE [DISTWIDTH] IN BLOOD BY AUTOMATED COUNT: 13.7 % (ref 10–15)
GFR SERPL CREATININE-BSD FRML MDRD: >90 ML/MIN/1.73M2
GLUCOSE BLD-MCNC: 120 MG/DL (ref 70–99)
HCT VFR BLD AUTO: 24.1 % (ref 40–53)
HGB BLD-MCNC: 7.8 G/DL (ref 13.3–17.7)
MCH RBC QN AUTO: 28.3 PG (ref 26.5–33)
MCHC RBC AUTO-ENTMCNC: 32.4 G/DL (ref 31.5–36.5)
MCV RBC AUTO: 87 FL (ref 78–100)
PLATELET # BLD AUTO: 157 10E3/UL (ref 150–450)
POTASSIUM BLD-SCNC: 4.2 MMOL/L (ref 3.4–5.3)
PROT SERPL-MCNC: 7.1 G/DL (ref 6.8–8.8)
RBC # BLD AUTO: 2.76 10E6/UL (ref 4.4–5.9)
SODIUM SERPL-SCNC: 137 MMOL/L (ref 133–144)
WBC # BLD AUTO: 6.7 10E3/UL (ref 4–11)

## 2021-11-12 PROCEDURE — 85027 COMPLETE CBC AUTOMATED: CPT

## 2021-11-12 PROCEDURE — 86900 BLOOD TYPING SEROLOGIC ABO: CPT | Performed by: EMERGENCY MEDICINE

## 2021-11-12 PROCEDURE — 36415 COLL VENOUS BLD VENIPUNCTURE: CPT | Performed by: EMERGENCY MEDICINE

## 2021-11-12 PROCEDURE — 250N000011 HC RX IP 250 OP 636: Performed by: STUDENT IN AN ORGANIZED HEALTH CARE EDUCATION/TRAINING PROGRAM

## 2021-11-12 PROCEDURE — 250N000013 HC RX MED GY IP 250 OP 250 PS 637: Performed by: STUDENT IN AN ORGANIZED HEALTH CARE EDUCATION/TRAINING PROGRAM

## 2021-11-12 PROCEDURE — 80048 BASIC METABOLIC PNL TOTAL CA: CPT

## 2021-11-12 PROCEDURE — 82248 BILIRUBIN DIRECT: CPT

## 2021-11-12 PROCEDURE — 90686 IIV4 VACC NO PRSV 0.5 ML IM: CPT | Performed by: STUDENT IN AN ORGANIZED HEALTH CARE EDUCATION/TRAINING PROGRAM

## 2021-11-12 PROCEDURE — G0008 ADMIN INFLUENZA VIRUS VAC: HCPCS | Performed by: STUDENT IN AN ORGANIZED HEALTH CARE EDUCATION/TRAINING PROGRAM

## 2021-11-12 RX ORDER — OXYCODONE HYDROCHLORIDE 5 MG/1
5 TABLET ORAL EVERY 6 HOURS PRN
Qty: 15 TABLET | Refills: 0 | Status: SHIPPED | OUTPATIENT
Start: 2021-11-12 | End: 2021-11-24

## 2021-11-12 RX ADMIN — SPIRONOLACTONE 75 MG: 50 TABLET ORAL at 10:34

## 2021-11-12 RX ADMIN — CIPROFLOXACIN 500 MG: 500 TABLET, FILM COATED ORAL at 10:35

## 2021-11-12 RX ADMIN — OXYCODONE HYDROCHLORIDE 10 MG: 10 TABLET ORAL at 11:16

## 2021-11-12 RX ADMIN — FUROSEMIDE 80 MG: 80 TABLET ORAL at 10:35

## 2021-11-12 RX ADMIN — OXYCODONE HYDROCHLORIDE 10 MG: 10 TABLET ORAL at 02:28

## 2021-11-12 RX ADMIN — INFLUENZA A VIRUS A/VICTORIA/2570/2019 IVR-215 (H1N1) ANTIGEN (FORMALDEHYDE INACTIVATED), INFLUENZA A VIRUS A/TASMANIA/503/2020 IVR-221 (H3N2) ANTIGEN (FORMALDEHYDE INACTIVATED), INFLUENZA B VIRUS B/PHUKET/3073/2013 ANTIGEN (FORMALDEHYDE INACTIVATED), AND INFLUENZA B VIRUS B/WASHINGTON/02/2019 ANTIGEN (FORMALDEHYDE INACTIVATED) 0.5 ML: 15; 15; 15; 15 INJECTION, SUSPENSION INTRAMUSCULAR at 11:07

## 2021-11-12 RX ADMIN — Medication 400 MG: at 12:32

## 2021-11-12 NOTE — PLAN OF CARE
Admitted/transferred from: PACU s/p abdominal hernia repair  2 RN full   skin assessment completed by Kristine Wakefield RN and Charlene NETTLES RN.  Skin assessment finding: issues found umbilical incision, scab above L eyebrow, tattoos.    Interventions/actions: other pillows applied, encouraged OOB and turning.      Will continue to monitor.

## 2021-11-12 NOTE — PLAN OF CARE
Vital signs:  Temp: 97.8  F (36.6  C) Temp src: Oral BP: 117/62 Pulse: 91   Resp: 14 SpO2: 94 % O2 Device: Nasal cannula Oxygen Delivery: 1 LPM Height: 182.9 cm (6') Weight: 96.7 kg (213 lb 1.6 oz)  Estimated body mass index is 28.9 kg/m  as calculated from the following:    Height as of this encounter: 1.829 m (6').    Weight as of this encounter: 96.7 kg (213 lb 1.6 oz).    Shift: 1900-11:30  NEURO: A&Ox4.   RESPIRATORY: WDL, on 1LNC d/t pain related shallow breathing. Removed this AM.   CARDIAC: denies cardiac chest pain. VSS.   GI/: voids spontaneously w/o difficulty. Reports burning w/ urination, possibly related to catheterization.   DIET: clears, advanced to regular this AM. Poor appetite.  PAIN/NAUSEA: pain managed w/ po oxycodone x3, abdominal binder. Denies nausea.   INCISION/DRAINS/SKIN: umbilical incision WDL.   IV ACCESS: L PIV infiltrated LR. R PIV infused LR, now SL.   ACTIVITY: SBA, up ad lety.   LAB: reviewed.   CHANGES: improving, preparing for discharge. Given flu vaccine.   PLAN: continue w/ POC. Discharge this afternoon.

## 2021-11-12 NOTE — ANESTHESIA POSTPROCEDURE EVALUATION
Patient: Florian Nowak    Procedure: Procedure(s):  HERNIORRHAPHY, UMBILICAL, OPEN       Diagnosis:Hernia, umbilical [K42.9]  Diagnosis Additional Information: No value filed.    Anesthesia Type:  General    Note:  Disposition: Inpatient   Postop Pain Control: Uneventful            Sign Out: Well controlled pain   PONV: No   Neuro/Psych: Uneventful            Sign Out: Acceptable/Baseline neuro status   Airway/Respiratory: Uneventful            Sign Out: Acceptable/Baseline resp. status   CV/Hemodynamics: Uneventful            Sign Out: Acceptable CV status; No obvious hypovolemia; No obvious fluid overload   Other NRE: NONE   DID A NON-ROUTINE EVENT OCCUR? No           Last vitals:  Vitals Value Taken Time   /84 11/11/21 1930   Temp 36.2  C (97.1  F) 11/11/21 1900   Pulse 83 11/11/21 1936   Resp 15 11/11/21 1915   SpO2 97 % 11/11/21 1935   Vitals shown include unvalidated device data.    Electronically Signed By: Lico Gonzalez MD  November 11, 2021  7:56 PM

## 2021-11-12 NOTE — PROGRESS NOTES
Surgery Progress Note  11/12/2021       Subjective:  - CLEMENTINE overnight.  - Pt denies n/v   - Pain controlled     Objective:  Temp:  [96.8  F (36  C)-98.3  F (36.8  C)] 97.8  F (36.6  C)  Pulse:  [] 91  Resp:  [11-24] 14  BP: (108-148)/(62-92) 117/62  SpO2:  [93 %-100 %] 94 %    I/O last 3 completed shifts:  In: 1000 [P.O.:300; I.V.:700]  Out: 530 [Urine:500; Blood:30]      Gen: Awake, alert, NAD  Resp: NLB on RA  Abd: soft, nondistended, nontender  Incision: c/d/I  Ext: WWP, no edema     Labs:  Recent Labs   Lab 11/12/21  0716 11/11/21  1233   WBC 6.7 3.7*   HGB 7.8* 8.7*    179       Recent Labs   Lab 11/12/21  0716 11/11/21  1657 11/11/21  1233 11/05/21  1428     --  134 136   POTASSIUM 4.2  --  3.5 3.9   CHLORIDE 109  --  104 106   CO2 21  --  25 23   BUN 16  --  16 14   CR 0.66  --  0.82 0.82   * 105* 153* 75   SANJANA 8.7  --  8.9 8.2*       Imaging:       Assessment/Plan:   41 year old male with PMHx of EtOH cirrhosis s/p TIPS presenting with long-standing umbilical hernia s/p urgent repair 11/11/21, POD#0.     -Pt labs are unremarkable  -If Pt tolerates PO diet can discharge later today  -C/w PTA meds   -Prn ant-emetics      Seen, examined, and discussed with chief resident, who will discuss with staff.    Elina Hardwick   PGY 1 Surgery

## 2021-11-12 NOTE — PLAN OF CARE
Patient discharged home following hospital stay for: umbilical hernia repair    Vitals stable.  Oxygen status: room air   Patient tolerating diet: regular    Belongings sent home with patient. IV site discontinued. Discharge meds to discharge pharmacy. AVS and education gone over with patient; signed copy in patient chart. Pt to follow up as outpatient and with PCP. Pt verbalized understanding of all education and information.

## 2021-11-12 NOTE — OR NURSING
Pt resting comfortably at present; VSS. Sign out will be completed later; ok to transfer pt to  per Dr. Lin.

## 2021-11-12 NOTE — DISCHARGE SUMMARY
"[facility] Discharge Summary    Florian Nowak MRN: [id number]   YOB: 1980 Age: 41 year old     Date of Admission:  11/11/2021  Date of Discharge::  11/12/2021  Admitting Physician:  Costa Malave MD  Discharge Physician:  Costa Malave MD  Primary Care Physician:         Perez Salcido          Procedures:   Open Umbilical Herniorraphy without mesh placement          Consultations:   None          HPI (from H&P):   \"Florian Nowak is a 41 year old male with PMHx of EtOH cirrhosis s/p TIPS who is being evaluated for umbilical hernia. Has a history of this hernia for which he feels like it has mostly been fluid secondary to his ascites. He has been able to reduce it himself previously. Earlier today, he was lifting something and felt a pop. The bulge has not reduced, and he was in considerable pain. No nausea or vomiting.      Attempted conscious sedation reduction at bedside in the ED which was not completely successful.      He did have a TIPS procedure in September.\"           Hospital Course:   The patient underwent listed procedure(s) above, which he tolerated without complications. Overall unremarkable hospitalization.  At the time of discharge, he was tolerating PO intake, ambulating, voiding spontaneously without difficulty, and pain was controlled with oral pain medications. The patient was discharged home in stable and improved condition.         Medications Prior to Admission:     Medications Prior to Admission   Medication Sig Dispense Refill Last Dose     ciprofloxacin (CIPRO) 500 MG tablet Take 1 tablet (500 mg) by mouth daily 90 tablet 3      ciprofloxacin (CIPRO) 500 MG tablet Take 500 mg by mouth daily         ferrous sulfate (FEROSUL) 325 (65 Fe) MG tablet Take 325 mg by mouth daily (with breakfast)        fish oil-omega-3 fatty acids 1000 MG capsule Take 2 g by mouth daily        furosemide (LASIX) 40 MG tablet Take 2 tablets (80 mg) by mouth 2 times daily 120 " tablet 3      magnesium 250 MG tablet Take 1 tablet by mouth daily        ondansetron (ZOFRAN) 4 MG tablet Take 1 tablet (4 mg) by mouth every 8 hours as needed for nausea 20 tablet 0      spironolactone (ALDACTONE) 25 MG tablet Take 3 tablets (75 mg) by mouth 2 times daily 180 tablet 3      vitamin D3 (CHOLECALCIFEROL) 250 mcg (96585 units) capsule Take 1 capsule by mouth daily               Discharge Medications:     Current Discharge Medication List      CONTINUE these medications which have NOT CHANGED    Details   !! ciprofloxacin (CIPRO) 500 MG tablet Take 1 tablet (500 mg) by mouth daily  Qty: 90 tablet, Refills: 3    Associated Diagnoses: SBP (spontaneous bacterial peritonitis) (H)      !! ciprofloxacin (CIPRO) 500 MG tablet Take 500 mg by mouth daily       ferrous sulfate (FEROSUL) 325 (65 Fe) MG tablet Take 325 mg by mouth daily (with breakfast)      fish oil-omega-3 fatty acids 1000 MG capsule Take 2 g by mouth daily      furosemide (LASIX) 40 MG tablet Take 2 tablets (80 mg) by mouth 2 times daily  Qty: 120 tablet, Refills: 3    Associated Diagnoses: Alcoholic cirrhosis of liver with ascites (H)      magnesium 250 MG tablet Take 1 tablet by mouth daily      ondansetron (ZOFRAN) 4 MG tablet Take 1 tablet (4 mg) by mouth every 8 hours as needed for nausea  Qty: 20 tablet, Refills: 0    Associated Diagnoses: Non-intractable vomiting with nausea, unspecified vomiting type      spironolactone (ALDACTONE) 25 MG tablet Take 3 tablets (75 mg) by mouth 2 times daily  Qty: 180 tablet, Refills: 3    Associated Diagnoses: Alcoholic cirrhosis of liver with ascites (H)      vitamin D3 (CHOLECALCIFEROL) 250 mcg (92681 units) capsule Take 1 capsule by mouth daily       !! - Potential duplicate medications found. Please discuss with provider.               Day of Discharge Physical Exam:   Temp:  [96.8  F (36  C)-98.3  F (36.8  C)] 97.8  F (36.6  C)  Pulse:  [] 91  Resp:  [11-24] 14  BP: (108-148)/(62-92)  117/62  SpO2:  [93 %-100 %] 94 %  General: A&O, NAD, lying comfortably in bed  Chest: NLB on RA  Abd: Soft, ND, NT, incisions c/d/i  Extremities: WWP  Neuro: Moving all extremities spontaneously without apparent deficit         Discharge Instructions and Follow-Up:   No discharge procedures on file.    Condition at discharge: Stable

## 2021-11-15 ENCOUNTER — PATIENT OUTREACH (OUTPATIENT)
Dept: SURGERY | Facility: CLINIC | Age: 41
End: 2021-11-15
Payer: COMMERCIAL

## 2021-11-15 ENCOUNTER — PATIENT OUTREACH (OUTPATIENT)
Dept: GASTROENTEROLOGY | Facility: CLINIC | Age: 41
End: 2021-11-15
Payer: COMMERCIAL

## 2021-11-15 NOTE — PROGRESS NOTES
Florian JESSIE Nowak is a patient of Dr. Costa Malave that underwent open umbilical hernia repair approximately 4 days ago (11/11).  Attempted to contact patient via telephone for a status update and review post op teaching.  LM on VM to call office.  Await return call.      Of note:  Wound:  Skin Sealant  Follow-up:  11/17 at 1130 with Dr. George. Patient is to arrange paracentesis.  Restrictions:  - No strenuous exercise for 3-4 weeks  - No lifting, pushing, pulling more than 15-20 pounds for 3-4 weeks  New medications:  Oxycodone  Equipment/Supplies:  None

## 2021-11-15 NOTE — PROGRESS NOTES
Pt hospitalized 11/11-11/12 for irreducible umbilical hernia and is s/p open umbilical herniorrhaphy. Called pt to check in and review plan of care. Pt reported doing well since discharge. Pt denied nausea/vomiting and is tolerating regular diet. Pt's incision is clean, dry, and intact. Pt denied ascites and lower extremity edema. No changes made to pt's diuretics (furosemide 80 mg 2 times daily and spironolactone 75 mg 2 times daily). Educated pt that ascites can get worse after surgery and can cause issues with leaking around the wound. Reiterated the importance of scheduling paracentesis more frequently if pt starts noticing increased abdominal distension. Pt verbalized understanding. Plan to follow up with pt in 1-2 weeks.

## 2021-11-15 NOTE — OP NOTE
[facility]    Operative Note    Pre-operative diagnosis: Hernia, umbilical [K42.9]   Post-operative diagnosis * No post-op diagnosis entered *   Procedure: Procedure(s):  HERNIORRHAPHY, UMBILICAL, OPEN   Surgeon: Surgeon(s) and Role:     * Costa Malave MD - Primary     * Maciej Love MD - Resident - Assisting   Anesthesia: General    Estimated blood loss: Less than 100 ml   Drains: None   Specimens: ID Type Source Tests Collected by Time Destination   1 : Umbilical hernia sac Tissue Hernia Sac, Umbilical SURGICAL PATHOLOGY EXAM Costa Malave MD 11/11/2021  4:23 PM    A :  Peritoneal Fluid Peritoneum ANAEROBIC BACTERIAL CULTURE ROUTINE, GRAM STAIN, AEROBIC BACTERIAL CULTURE ROUTINE Costa Malave MD 11/11/2021  4:12 PM       Findings: Atypical bleeding inherent to the operation that was recognized and controlled.  There was an umbilical hernia measuring 1.5 cm and this contained 4 cm small bowel and omentum.    There was  incarceration at the umbilicus.     Complications: None.   Implants: None.         COMORBIDITIES:   Past Medical History:   Diagnosis Date     Diabetes (H)      History of blood transfusion      Hypertension        INDICATIONS FOR PROCEDURE  Florian Nowak is a 41 year old male who with liver cirrhosis, MELD 11 s/p TIPS procedure in 9/2021 with known umbilical hernia, presented with incarceration x 2 hours that was unable to be reduced with conscious sedation. The apatient was taken emergently to the OR for hernia reduction and repair.    After understanding the risks and benefits of proceeding with a open umbilical hernia repair, he agreed to an operation.    General risks related to abdominal surgery were reviewed with the patient.    These include, but are not limited to, death, myocardial infarction, pneumonia, urinary tract infection, deep venous thrombosis with or without pulmonary embolus, abdominal infection from bowel  "injury or abscess, bowel obstruction, wound infection, and bleeding.    Risks related to hernia repair were also discussed and these specifically include the risk of recurrence, chronic abdominal wall pain, seroma, and wound infection.    OPERATIVE PROCEDURE:  Under the benefits of general endotracheal anesthesia, a 4 cm incision was made in a curvilinear crease at the infraumbilical location.    A complete dissection at the umbilical stalk was performed using clamps and cautery.  Then the hernia opening was revealed by opening the peritoneum.   4 cm of inflamed and contused, but viable small bowel was involved in the hernia and this was reduced into the abdomen. Approximately 300 ml of turbid ascites fluid was suctioned and a sample was sent for culture.    The umbilical hernia defect size are as described in the findings above.    A decision was then made not to place mesh in the setting of acute incarceration and turbid ascites.    The umbilical hernia defect was closed using a series of four figure of eight suture of O PDS suture.    Next, the fascia was inspected and found to be intact.    Hemostasis was achieved.     At this time, redundant umbilical skin was excised due to its thin nature, discoloration.    Next, a 3-0 suture of vicryl was used to tack the underside of the umbilical skin to the fascia to re-create an \"innie\" umbilicus. A second layer of 3-) vicryl was used to bring deep tissue together.    The skin was closed using 4-0 absorbable suture and skin glue was applied.    Sterile dressings were applied.  The patient tolerated the procedure well.       I was scrubbed for all critical components of the operation.    All sponge and needle counts were correct x 2 at the end of the procedure.    Costa Malave MD                  "

## 2021-11-16 LAB
BACTERIA PRT CULT: NO GROWTH
PATH REPORT.COMMENTS IMP SPEC: NORMAL
PATH REPORT.COMMENTS IMP SPEC: NORMAL
PATH REPORT.FINAL DX SPEC: NORMAL
PATH REPORT.GROSS SPEC: NORMAL
PATH REPORT.MICROSCOPIC SPEC OTHER STN: NORMAL
PATH REPORT.RELEVANT HX SPEC: NORMAL
PHOTO IMAGE: NORMAL

## 2021-11-16 NOTE — PROGRESS NOTES
RN Post-Op/Post-Discharge Care Coordination Note    Spoke with Patient.    Support  Patient able to care for self independently     Health Status  Nausea/Vomiting: Patient denies nausea/vomiting.  Eating/drinking: Patient is able to eat and drink without any complaints.  Bowel habits: Patient reports constipation with last BM ~2 days ago. He is passing flatus.  He plans to take an OTC laxative today per package instructions  Drains (ROSARIO): N/A  Fevers/chills: Patient denies any fever or chills.  Incisions: Patient denies any signs and symptoms of infection..  Wound closure:  Skin Sealant. No ascites leaking. Denies need for paracentesis.  Pain: Improved, not taking narcotics.    Activity/Restrictions  No lifting in excess of 15-20 pounds for 3-4 weeks    Equipment  None    Pathology reviewed with patient:  N/A    All of his questions were answered including reviewing restrictions and wound care.  He will call this office if he has any further questions and/or concerns.      Post op appointment arranged 11/17 with Dr. George. Patient is aware.    Whom and When to Call  Patient acknowledges understanding of how to manage any medication changes and   when to seek medical care.     Patient advised that if after hour medical concerns arise to please call [phone] and choose option 4 to speak to the physician on call.

## 2021-11-18 LAB — BACTERIA PRT CULT: NORMAL

## 2021-11-24 ENCOUNTER — VIRTUAL VISIT (OUTPATIENT)
Dept: GASTROENTEROLOGY | Facility: CLINIC | Age: 41
End: 2021-11-24
Attending: STUDENT IN AN ORGANIZED HEALTH CARE EDUCATION/TRAINING PROGRAM
Payer: COMMERCIAL

## 2021-11-24 DIAGNOSIS — D62 ACUTE BLOOD LOSS ANEMIA: ICD-10-CM

## 2021-11-24 DIAGNOSIS — K65.2 SBP (SPONTANEOUS BACTERIAL PERITONITIS) (H): ICD-10-CM

## 2021-11-24 DIAGNOSIS — K70.31 ALCOHOLIC CIRRHOSIS OF LIVER WITH ASCITES (H): ICD-10-CM

## 2021-11-24 DIAGNOSIS — K42.0 INCARCERATED UMBILICAL HERNIA: Primary | ICD-10-CM

## 2021-11-24 PROCEDURE — 99214 OFFICE O/P EST MOD 30 MIN: CPT | Mod: 95 | Performed by: STUDENT IN AN ORGANIZED HEALTH CARE EDUCATION/TRAINING PROGRAM

## 2021-11-24 ASSESSMENT — PAIN SCALES - GENERAL: PAINLEVEL: NO PAIN (0)

## 2021-11-24 NOTE — PROGRESS NOTES
Florian is a 41 year old who is being evaluated via a billable video visit.      How would you like to obtain your AVS? MyChart  If the video visit is dropped, the invitation should be resent by: Send to e-mail at: [email or web address]  Will anyone else be joining your video visit? No      Video Start Time: 1:31 PM  Video-Visit Details    Type of service:  Video Visit    Video End Time:1:42 PM    Originating Location (pt. Location): Home    Distant Location (provider location):  [facility] [place]     Platform used for Video Visit: [employer]    [facility] Return Patient Visit    Date of Visit: September 8, 2021    Reason for referral: Alcohol related liver disease    Subjective: Mr. Nowak is a 41 year old man with a history of AUD, alcohol related cirrhosis, who presents for follow up of his decompensated cirrhosis    Initial History:     He was told he had KANG in his 30s while living in [place]. Was around 300 lbs then. At that time was drinking 5 drinks 5 days a week. Ferritin was elevated, did not get HC testing.     He reports he would drink 5 drinks 5 days a week roughly. He would have some periods of sobriety for a few months when trying to quit. Spring 2020 cut back to 2 glasses of wine a day. Was not feeling well, stopping drinking in October and then suddenly got bloated. Presented to urgent care 10/2020 found to have new ascites. BR was 5 at that time.     First few months could go 2-4 weeks between paracentesis, had to increase frequency. In May K noted to be high with DELLA (creatinine 3.4) - was admitted for this. Was taking hydrochlorothiazide, lisinopril and BB at that time. After this was getting twice a week paracentesis, sometimes up to 10 L. He does not eat processed foods, has cut back on his salt and now getting rob every 5-6 days, 6-10 liters with albumin replacement. Sent for cell count every time, no history of SBP. Not restricting  fluid specifically because gets light headed with this, does not think he is drinking too much. Taking lasix 80 mg daily, dose increased a few weeks ago.     He has never done alcohol counseling. No history of DUIs or trouble at work but there was an instance where someone thought he smelled like alcohol, had to get tested and that was negative. He is committed to never drinking again, does not have cravings to drink.     Painful leg tremors/shaking/cramping at night - causing sleep deprivation. Started magnesium and iron for this. Having issues with insomnia.     No history of HE, variceal bleeding. Had an EGD 6/2021 that showed grade 2 EVs with red whales, banded. Also severe PHG.     Had negative hepatitis B and C testing in the past. Getting vaccinated against hepatitis A/B.     Admission for SBP 8/27/2021 - presented with abdominal pain and chills. Found to have SBP and strep salivarius bacteremia. Treated with IV ceftriaxone, then transitioned to augmentin, then cipro ppx. Hospital course complicated by DELLA (creatinine 1.95), got IV albumin, creatinine  improved on discharge to 1.19. Na stable in the mid 120s.    Admission for variceal bleeding 9/4-7. Was at home with daughter on the weekend - felt really dizzy and lightheaded, passed out. Underwent an EGD - was banded x 5 on 9/5. Taking augment to complete this course for SBP ppx and then will go back to cipro daily. Hospital course c/b DELLA - creatinine 1.9, down to 1.6 on discharge. Taking lasix 40 mg daily.     Admitted for variceal bleeding the end of September. Transferred for TIPS 9/22/2021 - gradient 23 -> 9 mmHG.     Interval Events  - Having issues with his umbilical hernia - requiring ED visit to reduce this. Unfortunately 11/11/2021 presented with irreducible hernia and required open umbilical herniorrhaphy. Had small amount of turbid ascites during surgery - culture without growth, no cell count sent  - Abdomen not distended currently - taking  lasix 80 mg BID and danial 75 mg BID  - Swelling in legs well controlled on current dose of diuretics  - Otherwise feeling well    ROS: 14 point ROS negative except for positives noted in HPI.    PMHx:  - AUD  - Alcohol related cirrhosis c/b refractory ascites/SBP, variceal bleeding  - Asthma  - HTN    PSHx:  - Back surgery microdisectomy    FamHx:  No family history of liver disease, liver cancer    SocHx:  Social History     Socioeconomic History     Marital status:      Spouse name: Not on file     Number of children: Not on file     Years of education: Not on file     Highest education level: Not on file   Occupational History     Not on file   Tobacco Use     Smoking status: Former Smoker     Smokeless tobacco: Never Used     Tobacco comment: College   Substance and Sexual Activity     Alcohol use: Not Currently     Comment: Quit ETOH 10/2020     Drug use: Not Currently     Sexual activity: Not on file   Other Topics Concern     Parent/sibling w/ CABG, MI or angioplasty before 65F 55M? 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   No NSAIDs    Allergies:  Allergies   Allergen Reactions     Ketorolac Tromethamine Other (See Comments)       Objective:  There were no vitals taken for this visit.  Constitutional: pleasant man in NAD  Eyes: non icteric  Respiratory: Normal respiratory excursion   Abd: Non distended, incision c/d/i  Skin: No jaundice  Psychiatric: normal mood and orientation    Labs:  Last Comprehensive Metabolic Panel:  Sodium   Date Value Ref Range Status   11/12/2021 137 133 - 144 mmol/L Final     Potassium   Date Value Ref Range Status   11/12/2021 4.2 3.4 - 5.3 mmol/L Final     Chloride   Date Value Ref Range Status   11/12/2021 109 94 - 109 mmol/L Final     Carbon Dioxide (CO2)   Date Value Ref Range Status   11/12/2021 21 20 - 32 mmol/L Final     Anion Gap   Date Value Ref Range Status   11/12/2021 7 3 - 14 mmol/L Final     Glucose   Date Value Ref Range Status   11/12/2021 120 (H) 70 - 99 mg/dL Final     Urea Nitrogen   Date Value Ref Range Status   11/12/2021 16 7 - 30 mg/dL Final     Creatinine   Date Value Ref Range Status   11/12/2021 0.66 0.66 - 1.25 mg/dL Final     GFR Estimate   Date Value Ref Range Status   11/12/2021 >90 >60 mL/min/1.73m2 Final     Comment:     As of July 11, 2021, eGFR is calculated by the CKD-EPI creatinine equation, without race adjustment. eGFR can be influenced by muscle mass, exercise, and diet. The reported eGFR is an estimation only and is only applicable if the renal function is stable.     Calcium   Date Value Ref Range Status   11/12/2021 8.7 8.5 - 10.1 mg/dL Final     Bilirubin Total   Date Value Ref Range Status   11/12/2021 1.0 0.2 - 1.3 mg/dL Final     Alkaline Phosphatase   Date Value Ref Range Status   11/12/2021 109 40 - 150 U/L Final     ALT   Date Value Ref Range Status   11/12/2021 14 0 - 70 U/L Final     AST   Date Value Ref Range Status   11/12/2021 23 0 - 45 U/L Final       Lab Results   Component Value Date    WBC 8.5  08/18/2021     Lab Results   Component Value Date    RBC 3.26 08/18/2021     Lab Results   Component Value Date    HGB 9.7 08/18/2021     Lab Results   Component Value Date    HCT 29.0 08/18/2021     Lab Results   Component Value Date    MCV 89 08/18/2021     Lab Results   Component Value Date    MCH 29.8 08/18/2021     Lab Results   Component Value Date    MCHC 33.4 08/18/2021     Lab Results   Component Value Date    RDW 14.2 08/18/2021     Lab Results   Component Value Date     08/18/2021       INR   Date Value Ref Range Status   11/11/2021 1.27 (H) 0.86 - 1.14 Final     Last Comprehensive Metabolic Panel:  Sodium   Date Value Ref Range Status   11/12/2021 137 133 - 144 mmol/L Final     Potassium   Date Value Ref Range Status   11/12/2021 4.2 3.4 - 5.3 mmol/L Final     Chloride   Date Value Ref Range Status   11/12/2021 109 94 - 109 mmol/L Final     Carbon Dioxide (CO2)   Date Value Ref Range Status   11/12/2021 21 20 - 32 mmol/L Final     Anion Gap   Date Value Ref Range Status   11/12/2021 7 3 - 14 mmol/L Final     Glucose   Date Value Ref Range Status   11/12/2021 120 (H) 70 - 99 mg/dL Final     Urea Nitrogen   Date Value Ref Range Status   11/12/2021 16 7 - 30 mg/dL Final     Creatinine   Date Value Ref Range Status   11/12/2021 0.66 0.66 - 1.25 mg/dL Final     GFR Estimate   Date Value Ref Range Status   11/12/2021 >90 >60 mL/min/1.73m2 Final     Comment:     As of July 11, 2021, eGFR is calculated by the CKD-EPI creatinine equation, without race adjustment. eGFR can be influenced by muscle mass, exercise, and diet. The reported eGFR is an estimation only and is only applicable if the renal function is stable.     Calcium   Date Value Ref Range Status   11/12/2021 8.7 8.5 - 10.1 mg/dL Final     Bilirubin Total   Date Value Ref Range Status   11/12/2021 1.0 0.2 - 1.3 mg/dL Final     Alkaline Phosphatase   Date Value Ref Range Status   11/12/2021 109 40 - 150 U/L Final     ALT   Date Value Ref Range  Status   11/12/2021 14 0 - 70 U/L Final     AST   Date Value Ref Range Status   11/12/2021 23 0 - 45 U/L Final             Lab Results   Component Value Date    WBC 8.3 09/10/2021     Lab Results   Component Value Date    RBC 2.64 09/10/2021     Lab Results   Component Value Date    HGB 7.9 09/10/2021     Lab Results   Component Value Date    HCT 24.3 09/10/2021     No components found for: MCT  Lab Results   Component Value Date    MCV 92 09/10/2021     Lab Results   Component Value Date    MCH 29.9 09/10/2021     Lab Results   Component Value Date    MCHC 32.5 09/10/2021     Lab Results   Component Value Date    RDW 14.4 09/10/2021     Lab Results   Component Value Date     09/10/2021     MELD-Na score: 9 at 11/12/2021  7:16 AM  MELD score: 9 at 11/12/2021  7:16 AM  Calculated from:  Serum Creatinine: 0.66 mg/dL (Using min of 1 mg/dL) at 11/12/2021  7:16 AM  Serum Sodium: 137 mmol/L at 11/12/2021  7:16 AM  Total Bilirubin: 1.0 mg/dL at 11/12/2021  7:16 AM  INR(ratio): 1.27 at 11/11/2021 12:33 PM  Age: 41 years    Imaging:    CT AP 8/27/2021    ABDOMEN/PELVIS:     No free intraperitoneal air. Large volume ascites. Ascites extends into a noninflamed umbilical ventral wall hernia.     Heterogeneous and cirrhotic appearance of the liver. The portal venous system appears patent. Portosystemic collateralization includes but is not limited to gastroesophageal varices. The spleen is mildly enlarged at 16 cm oblique craniocaudad dimension.     Gallbladder, pancreas, adrenals, kidneys, and pelvic organs are unremarkable.     No bowel obstruction. Mild wall thickening of multiple small bowel loops and portions of the colon may reflect portal enteropathy and colopathy. The appendix is normal.     Small lymph nodes are scattered throughout the upper abdomen, mesentery, and retroperitoneum. These are nonspecific, but likely reactive in a patient with chronic liver disease.     MUSCULOSKELETAL: No aggressive appearing  bone lesion.       IMPRESSION:     1. Heterogeneous and cirrhotic appearing liver with portosystemic collateralization and mild splenomegaly.   2. Large volume ascites.   3. Mild wall thickening of multiple small bowel loops and portions of the colon likely reflects portal enteropathy and colopathy, though other types of enteritis and colitis are not excluded.   4. Noninflamed umbilical hernia containing ascites.      Endoscopy: Reviewed in EHR    Independently reviewed labs and imaging.      Assessment/Plan: Mr. Nowak is a 41 year old man with a history of AUD, alcohol related cirrhosis c/b refractory ascites/SBP, recurrent variceal bleeding, now s/p TIPS 9/22.     Ascites improved post TIPS, but had issues with being able to reduce his umbilical hernia, ultimately on 11/11 was unable to reduce and required surgical repair. Post surgery he is doing well, not having recurrence of ascites and LE is doing well    If his abdomen continues to be flat without evidence of recurrent ascites next week, will plan to start to slowly taper diuretics. Do not want to start too early to avoid fluid accumulation and pressure on the wound    - Plan to decrease diuretics next week if no clinical ascites - currently taking lasix 80 mg BID and danial 75 mg BID - decrease to lasix 60 mg BID and danial 50 mg BID  - Continue cipro for SBP ppx while still on diuretics  - Continue low sodium diet  - Followed by complex care management RN  - Discussed complete sobriety from alcohol, including NA beers  - HCC screening 8/2021- repeat 6 months  - Recommend daily iron to help with history of acute blood loss anemia    RTC 3 months    Cadence Murray MD MS  Hepatology/Liver Transplant  [facility]

## 2021-11-24 NOTE — Clinical Note
Jimmy - can you arrange RV in 3 months  Gege - I told him to check in next week, and if his ascites is still not there, can start to slowly decrease diuretics

## 2021-11-24 NOTE — LETTER
11/24/2021     RE: Florian Nowak  [address]  [facility] [address]    Dear Colleague,    Thank you for referring your patient, Florian Nowak, to the [facility] [place]. Please see a copy of my visit note below.    Florian is a 41 year old who is being evaluated via a billable video visit.      How would you like to obtain your AVS? MyChart  If the video visit is dropped, the invitation should be resent by: Send to e-mail at: [email or web address]  Will anyone else be joining your video visit? No      Video Start Time: 1:31 PM  Video-Visit Details    Type of service:  Video Visit    Video End Time:1:42 PM    Originating Location (pt. Location): Home    Distant Location (provider location):  [facility] [place]     Platform used for Video Visit: [employer]    [facility] Return Patient Visit    Date of Visit: September 8, 2021    Reason for referral: Alcohol related liver disease    Subjective: Mr. Nowak is a 41 year old man with a history of AUD, alcohol related cirrhosis, who presents for follow up of his decompensated cirrhosis    Initial History:     He was told he had KANG in his 30s while living in [place]. Was around 300 lbs then. At that time was drinking 5 drinks 5 days a week. Ferritin was elevated, did not get HC testing.     He reports he would drink 5 drinks 5 days a week roughly. He would have some periods of sobriety for a few months when trying to quit. Spring 2020 cut back to 2 glasses of wine a day. Was not feeling well, stopping drinking in October and then suddenly got bloated. Presented to urgent care 10/2020 found to have new ascites. BR was 5 at that time.     First few months could go 2-4 weeks between paracentesis, had to increase frequency. In May K noted to be high with DELLA (creatinine 3.4) - was admitted for this. Was taking hydrochlorothiazide, lisinopril and BB at that time. After  this was getting twice a week paracentesis, sometimes up to 10 L. He does not eat processed foods, has cut back on his salt and now getting rob every 5-6 days, 6-10 liters with albumin replacement. Sent for cell count every time, no history of SBP. Not restricting fluid specifically because gets light headed with this, does not think he is drinking too much. Taking lasix 80 mg daily, dose increased a few weeks ago.     He has never done alcohol counseling. No history of DUIs or trouble at work but there was an instance where someone thought he smelled like alcohol, had to get tested and that was negative. He is committed to never drinking again, does not have cravings to drink.     Painful leg tremors/shaking/cramping at night - causing sleep deprivation. Started magnesium and iron for this. Having issues with insomnia.     No history of HE, variceal bleeding. Had an EGD 6/2021 that showed grade 2 EVs with red whales, banded. Also severe PHG.     Had negative hepatitis B and C testing in the past. Getting vaccinated against hepatitis A/B.     Admission for SBP 8/27/2021 - presented with abdominal pain and chills. Found to have SBP and strep salivarius bacteremia. Treated with IV ceftriaxone, then transitioned to augmentin, then cipro ppx. Hospital course complicated by DELLA (creatinine 1.95), got IV albumin, creatinine  improved on discharge to 1.19. Na stable in the mid 120s.    Admission for variceal bleeding 9/4-7. Was at home with daughter on the weekend - felt really dizzy and lightheaded, passed out. Underwent an EGD - was banded x 5 on 9/5. Taking augment to complete this course for SBP ppx and then will go back to cipro daily. Hospital course c/b DELLA - creatinine 1.9, down to 1.6 on discharge. Taking lasix 40 mg daily.     Admitted for variceal bleeding the end of September. Transferred for TIPS 9/22/2021 - gradient 23 -> 9 mmHG.     Interval Events  - Having issues with his umbilical hernia - requiring ED  visit to reduce this. Unfortunately 11/11/2021 presented with irreducible hernia and required open umbilical herniorrhaphy. Had small amount of turbid ascites during surgery - culture without growth, no cell count sent  - Abdomen not distended currently - taking lasix 80 mg BID and danial 75 mg BID  - Swelling in legs well controlled on current dose of diuretics  - Otherwise feeling well    ROS: 14 point ROS negative except for positives noted in HPI.    PMHx:  - AUD  - Alcohol related cirrhosis c/b refractory ascites/SBP, variceal bleeding  - Asthma  - HTN    PSHx:  - Back surgery microdisectomy    FamHx:  No family history of liver disease, liver cancer    SocHx:  Social History     Socioeconomic History     Marital status:      Spouse name: Not on file     Number of children: Not on file     Years of education: Not on file     Highest education level: Not on file   Occupational History     Not on file   Tobacco Use     Smoking status: Former Smoker     Smokeless tobacco: Never Used     Tobacco comment: College   Substance and Sexual Activity     Alcohol use: Not Currently     Comment: Quit ETOH 10/2020     Drug use: Not Currently     Sexual activity: Not on file   Other Topics Concern     Parent/sibling w/ CABG, MI or angioplasty before 65F 55M? 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   No NSAIDs    Allergies:  Allergies   Allergen Reactions     Ketorolac Tromethamine Other (See Comments)       Objective:  There were no vitals taken for this visit.  Constitutional: pleasant man in NAD  Eyes: non icteric  Respiratory: Normal respiratory excursion   Abd: Non distended, incision c/d/i  Skin: No jaundice  Psychiatric: normal mood and orientation    Labs:  Last Comprehensive Metabolic Panel:  Sodium   Date Value Ref Range Status   11/12/2021 137 133 - 144 mmol/L Final     Potassium   Date Value Ref Range Status   11/12/2021 4.2 3.4 - 5.3 mmol/L Final     Chloride   Date Value Ref Range Status   11/12/2021 109 94 - 109 mmol/L Final     Carbon Dioxide (CO2)   Date Value Ref Range Status   11/12/2021 21 20 - 32 mmol/L Final     Anion Gap   Date Value Ref Range Status   11/12/2021 7 3 - 14 mmol/L Final     Glucose   Date Value Ref Range Status   11/12/2021 120 (H) 70 - 99 mg/dL Final     Urea Nitrogen   Date Value Ref Range Status   11/12/2021 16 7 - 30 mg/dL Final     Creatinine   Date Value Ref Range Status   11/12/2021 0.66 0.66 - 1.25 mg/dL Final     GFR Estimate   Date Value Ref Range Status   11/12/2021 >90 >60 mL/min/1.73m2 Final     Comment:     As of July 11, 2021, eGFR is calculated by the CKD-EPI creatinine equation, without race adjustment. eGFR can be influenced by muscle mass, exercise, and diet. The reported eGFR is an estimation only and is only applicable if the renal function is stable.     Calcium   Date Value Ref Range Status   11/12/2021 8.7 8.5 - 10.1 mg/dL Final     Bilirubin Total   Date Value Ref Range Status   11/12/2021 1.0 0.2 - 1.3 mg/dL Final     Alkaline Phosphatase    Date Value Ref Range Status   11/12/2021 109 40 - 150 U/L Final     ALT   Date Value Ref Range Status   11/12/2021 14 0 - 70 U/L Final     AST   Date Value Ref Range Status   11/12/2021 23 0 - 45 U/L Final       Lab Results   Component Value Date    WBC 8.5 08/18/2021     Lab Results   Component Value Date    RBC 3.26 08/18/2021     Lab Results   Component Value Date    HGB 9.7 08/18/2021     Lab Results   Component Value Date    HCT 29.0 08/18/2021     Lab Results   Component Value Date    MCV 89 08/18/2021     Lab Results   Component Value Date    MCH 29.8 08/18/2021     Lab Results   Component Value Date    MCHC 33.4 08/18/2021     Lab Results   Component Value Date    RDW 14.2 08/18/2021     Lab Results   Component Value Date     08/18/2021       INR   Date Value Ref Range Status   11/11/2021 1.27 (H) 0.86 - 1.14 Final     Last Comprehensive Metabolic Panel:  Sodium   Date Value Ref Range Status   11/12/2021 137 133 - 144 mmol/L Final     Potassium   Date Value Ref Range Status   11/12/2021 4.2 3.4 - 5.3 mmol/L Final     Chloride   Date Value Ref Range Status   11/12/2021 109 94 - 109 mmol/L Final     Carbon Dioxide (CO2)   Date Value Ref Range Status   11/12/2021 21 20 - 32 mmol/L Final     Anion Gap   Date Value Ref Range Status   11/12/2021 7 3 - 14 mmol/L Final     Glucose   Date Value Ref Range Status   11/12/2021 120 (H) 70 - 99 mg/dL Final     Urea Nitrogen   Date Value Ref Range Status   11/12/2021 16 7 - 30 mg/dL Final     Creatinine   Date Value Ref Range Status   11/12/2021 0.66 0.66 - 1.25 mg/dL Final     GFR Estimate   Date Value Ref Range Status   11/12/2021 >90 >60 mL/min/1.73m2 Final     Comment:     As of July 11, 2021, eGFR is calculated by the CKD-EPI creatinine equation, without race adjustment. eGFR can be influenced by muscle mass, exercise, and diet. The reported eGFR is an estimation only and is only applicable if the renal function is stable.     Calcium   Date Value Ref Range  Status   11/12/2021 8.7 8.5 - 10.1 mg/dL Final     Bilirubin Total   Date Value Ref Range Status   11/12/2021 1.0 0.2 - 1.3 mg/dL Final     Alkaline Phosphatase   Date Value Ref Range Status   11/12/2021 109 40 - 150 U/L Final     ALT   Date Value Ref Range Status   11/12/2021 14 0 - 70 U/L Final     AST   Date Value Ref Range Status   11/12/2021 23 0 - 45 U/L Final             Lab Results   Component Value Date    WBC 8.3 09/10/2021     Lab Results   Component Value Date    RBC 2.64 09/10/2021     Lab Results   Component Value Date    HGB 7.9 09/10/2021     Lab Results   Component Value Date    HCT 24.3 09/10/2021     No components found for: MCT  Lab Results   Component Value Date    MCV 92 09/10/2021     Lab Results   Component Value Date    MCH 29.9 09/10/2021     Lab Results   Component Value Date    MCHC 32.5 09/10/2021     Lab Results   Component Value Date    RDW 14.4 09/10/2021     Lab Results   Component Value Date     09/10/2021     MELD-Na score: 9 at 11/12/2021  7:16 AM  MELD score: 9 at 11/12/2021  7:16 AM  Calculated from:  Serum Creatinine: 0.66 mg/dL (Using min of 1 mg/dL) at 11/12/2021  7:16 AM  Serum Sodium: 137 mmol/L at 11/12/2021  7:16 AM  Total Bilirubin: 1.0 mg/dL at 11/12/2021  7:16 AM  INR(ratio): 1.27 at 11/11/2021 12:33 PM  Age: 41 years    Imaging:    CT AP 8/27/2021    ABDOMEN/PELVIS:     No free intraperitoneal air. Large volume ascites. Ascites extends into a noninflamed umbilical ventral wall hernia.     Heterogeneous and cirrhotic appearance of the liver. The portal venous system appears patent. Portosystemic collateralization includes but is not limited to gastroesophageal varices. The spleen is mildly enlarged at 16 cm oblique craniocaudad dimension.     Gallbladder, pancreas, adrenals, kidneys, and pelvic organs are unremarkable.     No bowel obstruction. Mild wall thickening of multiple small bowel loops and portions of the colon may reflect portal enteropathy and  colopathy. The appendix is normal.     Small lymph nodes are scattered throughout the upper abdomen, mesentery, and retroperitoneum. These are nonspecific, but likely reactive in a patient with chronic liver disease.     MUSCULOSKELETAL: No aggressive appearing bone lesion.       IMPRESSION:     1. Heterogeneous and cirrhotic appearing liver with portosystemic collateralization and mild splenomegaly.   2. Large volume ascites.   3. Mild wall thickening of multiple small bowel loops and portions of the colon likely reflects portal enteropathy and colopathy, though other types of enteritis and colitis are not excluded.   4. Noninflamed umbilical hernia containing ascites.      Endoscopy: Reviewed in EHR    Independently reviewed labs and imaging.      Assessment/Plan: Mr. Nowak is a 41 year old man with a history of AUD, alcohol related cirrhosis c/b refractory ascites/SBP, recurrent variceal bleeding, now s/p TIPS 9/22.     Ascites improved post TIPS, but had issues with being able to reduce his umbilical hernia, ultimately on 11/11 was unable to reduce and required surgical repair. Post surgery he is doing well, not having recurrence of ascites and LE is doing well    If his abdomen continues to be flat without evidence of recurrent ascites next week, will plan to start to slowly taper diuretics. Do not want to start too early to avoid fluid accumulation and pressure on the wound    - Plan to decrease diuretics next week if no clinical ascites - currently taking lasix 80 mg BID and danial 75 mg BID - decrease to lasix 60 mg BID and danial 50 mg BID  - Continue cipro for SBP ppx while still on diuretics  - Continue low sodium diet  - Followed by complex care management RN  - Discussed complete sobriety from alcohol, including NA beers  - HCC screening 8/2021- repeat 6 months  - Recommend daily iron to help with history of acute blood loss anemia    RTC 3 months    Cadence Murray MD MS  Hepatology/Liver  Transplant  [facility]

## 2021-12-01 ENCOUNTER — PATIENT OUTREACH (OUTPATIENT)
Dept: GASTROENTEROLOGY | Facility: CLINIC | Age: 41
End: 2021-12-01
Payer: COMMERCIAL

## 2021-12-01 NOTE — PROGRESS NOTES
Pt had hepatology follow up on 11/24. Plan established at visit per Dr. Murray:    1) Check in on pt's ascites in 1 week and decrease diuretics is able- Pt is currently taking lasix 80 mg 2 times daily and spironolactone 75 mg 2 times daily. Would decrease to lasix 60 mg 2 times daily and spironolactone 50 mg 2 times daily  2) Continue Ciprofloxacin for SBP prophylaxis while still on diuretics  3) Low Na diet  4) Complete sobriety from alcohol, including NA beers  5) HCC screening due 2/2022  6) Daily iron supplement to help with history of acute blood loss anemia  7) Return to clinic in 3 months      Attempted to reach patient for check in and review plan of care, no answer, message left requesting call back, number provided.

## 2021-12-17 ENCOUNTER — LAB (OUTPATIENT)
Dept: LAB | Facility: CLINIC | Age: 41
End: 2021-12-17
Payer: COMMERCIAL

## 2021-12-17 DIAGNOSIS — K70.31 ALCOHOLIC CIRRHOSIS OF LIVER WITH ASCITES (H): ICD-10-CM

## 2021-12-17 PROCEDURE — 36415 COLL VENOUS BLD VENIPUNCTURE: CPT

## 2021-12-17 PROCEDURE — 80048 BASIC METABOLIC PNL TOTAL CA: CPT

## 2021-12-18 LAB
ANION GAP SERPL CALCULATED.3IONS-SCNC: 6 MMOL/L (ref 3–14)
BUN SERPL-MCNC: 20 MG/DL (ref 7–30)
CALCIUM SERPL-MCNC: 8.3 MG/DL (ref 8.5–10.1)
CHLORIDE BLD-SCNC: 105 MMOL/L (ref 94–109)
CO2 SERPL-SCNC: 26 MMOL/L (ref 20–32)
CREAT SERPL-MCNC: 0.83 MG/DL (ref 0.66–1.25)
GFR SERPL CREATININE-BSD FRML MDRD: >90 ML/MIN/1.73M2
GLUCOSE BLD-MCNC: 156 MG/DL (ref 70–99)
POTASSIUM BLD-SCNC: 3 MMOL/L (ref 3.4–5.3)
SODIUM SERPL-SCNC: 137 MMOL/L (ref 133–144)

## 2021-12-27 ENCOUNTER — PATIENT OUTREACH (OUTPATIENT)
Dept: GASTROENTEROLOGY | Facility: CLINIC | Age: 41
End: 2021-12-27
Payer: COMMERCIAL

## 2021-12-27 NOTE — PROGRESS NOTES
Pt had paracentesis appointment on 12/22 and had 1800 ml of fluid removed. Attempted to reach patient for check in, no answer, message left requesting call back, number provided.

## 2021-12-27 NOTE — PROGRESS NOTES
Pt stated that he did not have abdominal discomfort prior to having paracentesis done on 12/22. Pt noticed small amount of abdominal distension and wanted to see how much ascites was present. Pt also reported lower extremity edema in bilateral legs. Pt is taking furosemide 80 mg by mouth 2 times daily. Discussed the importance of ensuring TIPS is functioning with ascites and pt plans to call [facility] and schedule TIPS follow up appointment and abdominal ultrasound.     Will check in with pt in 1-2 weeks. Pt verbalized understanding and is agreeable to plan of care.

## 2022-01-09 ENCOUNTER — HEALTH MAINTENANCE LETTER (OUTPATIENT)
Age: 42
End: 2022-01-09

## 2022-01-14 DIAGNOSIS — Z95.828 S/P TIPS (TRANSJUGULAR INTRAHEPATIC PORTOSYSTEMIC SHUNT): Primary | ICD-10-CM

## 2022-01-28 ENCOUNTER — PATIENT OUTREACH (OUTPATIENT)
Dept: GASTROENTEROLOGY | Facility: CLINIC | Age: 42
End: 2022-01-28
Payer: COMMERCIAL

## 2022-01-28 DIAGNOSIS — K70.31 ALCOHOLIC CIRRHOSIS OF LIVER WITH ASCITES (H): Primary | ICD-10-CM

## 2022-01-28 NOTE — PROGRESS NOTES
Pt stated that he has norovirus and had an episode of hematemesis. Pt denied having hematochezia and melena. Pt also denied feeling lightheaded or dizzy. Reviewed pt's symptoms with  and Dr. Murray recommended pt get labs drawn today. Orders placed for labs. Encouraged pt to stay hydrated and advised pt to present to the emergency room if he has more hematemesis. Pt verbalized understanding and is agreeable to plan of care.

## 2022-02-04 ENCOUNTER — LAB (OUTPATIENT)
Dept: LAB | Facility: CLINIC | Age: 42
End: 2022-02-04
Payer: COMMERCIAL

## 2022-02-04 ENCOUNTER — ANCILLARY PROCEDURE (OUTPATIENT)
Dept: ULTRASOUND IMAGING | Facility: CLINIC | Age: 42
End: 2022-02-04
Attending: STUDENT IN AN ORGANIZED HEALTH CARE EDUCATION/TRAINING PROGRAM
Payer: COMMERCIAL

## 2022-02-04 DIAGNOSIS — K70.31 ALCOHOLIC CIRRHOSIS OF LIVER WITH ASCITES (H): ICD-10-CM

## 2022-02-04 DIAGNOSIS — Z95.828 S/P TIPS (TRANSJUGULAR INTRAHEPATIC PORTOSYSTEMIC SHUNT): ICD-10-CM

## 2022-02-04 LAB
ALBUMIN SERPL-MCNC: 2.8 G/DL (ref 3.4–5)
ALP SERPL-CCNC: 108 U/L (ref 40–150)
ALT SERPL W P-5'-P-CCNC: 28 U/L (ref 0–70)
ANION GAP SERPL CALCULATED.3IONS-SCNC: 7 MMOL/L (ref 3–14)
AST SERPL W P-5'-P-CCNC: 40 U/L (ref 0–45)
BILIRUB SERPL-MCNC: 1.1 MG/DL (ref 0.2–1.3)
BUN SERPL-MCNC: 14 MG/DL (ref 7–30)
CALCIUM SERPL-MCNC: 8.5 MG/DL (ref 8.5–10.1)
CHLORIDE BLD-SCNC: 108 MMOL/L (ref 94–109)
CO2 SERPL-SCNC: 27 MMOL/L (ref 20–32)
CREAT SERPL-MCNC: 0.85 MG/DL (ref 0.66–1.25)
ERYTHROCYTE [DISTWIDTH] IN BLOOD BY AUTOMATED COUNT: 14.9 % (ref 10–15)
GFR SERPL CREATININE-BSD FRML MDRD: >90 ML/MIN/1.73M2
GLUCOSE BLD-MCNC: 95 MG/DL (ref 70–99)
HCT VFR BLD AUTO: 29.6 % (ref 40–53)
HGB BLD-MCNC: 9.4 G/DL (ref 13.3–17.7)
INR PPP: 1.31 (ref 0.85–1.15)
MCH RBC QN AUTO: 26.5 PG (ref 26.5–33)
MCHC RBC AUTO-ENTMCNC: 31.8 G/DL (ref 31.5–36.5)
MCV RBC AUTO: 83 FL (ref 78–100)
PLATELET # BLD AUTO: 139 10E3/UL (ref 150–450)
POTASSIUM BLD-SCNC: 3.3 MMOL/L (ref 3.4–5.3)
PROT SERPL-MCNC: 6.9 G/DL (ref 6.8–8.8)
RBC # BLD AUTO: 3.55 10E6/UL (ref 4.4–5.9)
SODIUM SERPL-SCNC: 142 MMOL/L (ref 133–144)
WBC # BLD AUTO: 4.3 10E3/UL (ref 4–11)

## 2022-02-04 PROCEDURE — 85610 PROTHROMBIN TIME: CPT | Performed by: PATHOLOGY

## 2022-02-04 PROCEDURE — 80053 COMPREHEN METABOLIC PANEL: CPT | Performed by: PATHOLOGY

## 2022-02-04 PROCEDURE — 36415 COLL VENOUS BLD VENIPUNCTURE: CPT | Performed by: PATHOLOGY

## 2022-02-04 PROCEDURE — 93975 VASCULAR STUDY: CPT | Mod: GC | Performed by: RADIOLOGY

## 2022-02-04 PROCEDURE — 85027 COMPLETE CBC AUTOMATED: CPT | Performed by: PATHOLOGY

## 2022-02-08 DIAGNOSIS — Z95.828 S/P TIPS (TRANSJUGULAR INTRAHEPATIC PORTOSYSTEMIC SHUNT): Primary | ICD-10-CM

## 2022-02-10 ENCOUNTER — ANCILLARY PROCEDURE (OUTPATIENT)
Dept: CT IMAGING | Facility: CLINIC | Age: 42
End: 2022-02-10
Attending: RADIOLOGY
Payer: COMMERCIAL

## 2022-02-10 DIAGNOSIS — Z95.828 S/P TIPS (TRANSJUGULAR INTRAHEPATIC PORTOSYSTEMIC SHUNT): ICD-10-CM

## 2022-02-10 PROCEDURE — 74177 CT ABD & PELVIS W/CONTRAST: CPT | Mod: GC | Performed by: RADIOLOGY

## 2022-02-10 RX ORDER — IOPAMIDOL 755 MG/ML
131 INJECTION, SOLUTION INTRAVASCULAR ONCE
Status: COMPLETED | OUTPATIENT
Start: 2022-02-10 | End: 2022-02-10

## 2022-02-10 RX ADMIN — IOPAMIDOL 131 ML: 755 INJECTION, SOLUTION INTRAVASCULAR at 15:07

## 2022-02-17 ENCOUNTER — PATIENT OUTREACH (OUTPATIENT)
Dept: GASTROENTEROLOGY | Facility: CLINIC | Age: 42
End: 2022-02-17
Payer: COMMERCIAL

## 2022-02-18 NOTE — PROGRESS NOTES
Pt reported intermittent nausea. Per pt, increased nausea was usually one of the symptoms pt felt with increased ascites. Pt denied abdominal distension but stated that he might schedule a paracentesis to see if there is enough fluid to drain and relieve nausea.     Pt has mild lower extremity edema. Pt reported compliance with diuretics. Pt denied hematemesis, hematochezia, and melena. Pt is having bowel movements.     Pt due for hepatology follow up in 2/2022. Pt will schedule follow up as soon as possible.Will check in with pt in 2-3 weeks. Pt verbalized understanding and is agreeable to plan of care.

## 2022-03-11 ENCOUNTER — TELEPHONE (OUTPATIENT)
Dept: SURGERY | Facility: CLINIC | Age: 42
End: 2022-03-11
Payer: COMMERCIAL

## 2022-03-11 ENCOUNTER — PATIENT OUTREACH (OUTPATIENT)
Dept: GASTROENTEROLOGY | Facility: CLINIC | Age: 42
End: 2022-03-11
Payer: COMMERCIAL

## 2022-03-11 DIAGNOSIS — K70.31 ALCOHOLIC CIRRHOSIS OF LIVER WITH ASCITES (H): Primary | ICD-10-CM

## 2022-03-11 NOTE — TELEPHONE ENCOUNTER
[facility] Call Center    Phone Message    May a detailed message be left on voicemail: yes     Reason for Call: Other: Pr pt has alot pain when the hernia come back out again. Per pt knows the hernia is back. Per pt had surgery with  back in [facility] of 2021. Writer looked up return pt and nothing is available until May. Per pt is in a lot of pain.  Please call pt back to schedule a follow up and possibly surgery again.     Action Taken: Message routed to:  [facility] ([facility] Gen surg    Travel Screening: Not Applicable

## 2022-03-11 NOTE — PROGRESS NOTES
Pt stated that he has an umbilical hernia again. Pt reported experiencing intermittent abdominal pain and nausea.Pt is having bowel movements and able to reduce hernia. Discussed that pt should schedule an appointment with general surgery and wear an abdominal binder as needed. Advised pt to present to the ER if experiencing uncontrolled nausea/vomiting, unable to have a bowel movement, and/or unable to reduce hernia.     Pt has a hepatology follow up on 3/22 and plans to get labs at local [facility] the week prior. Will check in with pt in 1-2 weeks. Pt verbalized understanding and is agreeable to plan of care.

## 2022-03-11 NOTE — TELEPHONE ENCOUNTER
Returned patients call. He is having the exact same pain he was having prior to his hernia repair. He has a bulge that he can push back in. Intermittently nauseated. Bowels are moving normally.     Scheduled patient to see Dr. Dickson on 3/15 since Dr. Malave does not have clinic again until May. Advised patient that if he develops persistent nausea/vomiting, his bowels are not moving, or he is unable to reduce his hernia he needs to be evaluated in the ED. He verbalized understanding and denies any further questions at this time.

## 2022-03-15 ENCOUNTER — OFFICE VISIT (OUTPATIENT)
Dept: SURGERY | Facility: CLINIC | Age: 42
End: 2022-03-15
Payer: COMMERCIAL

## 2022-03-15 VITALS
SYSTOLIC BLOOD PRESSURE: 126 MMHG | HEART RATE: 89 BPM | HEIGHT: 72 IN | WEIGHT: 259.4 LBS | BODY MASS INDEX: 35.13 KG/M2 | DIASTOLIC BLOOD PRESSURE: 69 MMHG | OXYGEN SATURATION: 100 %

## 2022-03-15 DIAGNOSIS — E66.01 MORBID OBESITY (H): ICD-10-CM

## 2022-03-15 DIAGNOSIS — R52 PAIN: Primary | ICD-10-CM

## 2022-03-15 PROBLEM — E11.9 TYPE 2 DIABETES MELLITUS WITHOUT COMPLICATION, WITHOUT LONG-TERM CURRENT USE OF INSULIN (H): Status: RESOLVED | Noted: 2018-01-08 | Resolved: 2022-03-15

## 2022-03-15 PROCEDURE — 99203 OFFICE O/P NEW LOW 30 MIN: CPT | Performed by: SURGERY

## 2022-03-15 ASSESSMENT — PAIN SCALES - GENERAL: PAINLEVEL: NO PAIN (0)

## 2022-03-15 NOTE — NURSING NOTE
Chief Complaint   Patient presents with     RECHECK     Hernia follow up        Vitals:    03/15/22 0843   BP: 126/69   BP Location: Left arm   Patient Position: Sitting   Cuff Size: Adult Large   Pulse: 89   SpO2: 100%   Weight: 117.7 kg (259 lb 6.4 oz)   Height: 1.829 m (6')       Body mass index is 35.18 kg/m .                          Elif Singh, EMT

## 2022-03-15 NOTE — PROGRESS NOTES
"Florian Nowak is status post:  11/11/2021  Herniorrhaphy umbilical (N/A) Procedure: HERNIORRHAPHY, UMBILICAL, OPEN;  Surgeon: Costa Malave MD;  Location: [facility] OR  Surgery without complications.  Post-op course without complications.  Past week noted significant pain at umbilicus with bulge.  No GI symptoms  Liver disease improved with Patient now off transplant list.  No tobacco, no diabetes, no immunosuppression.    Past Medical History:   Diagnosis Date     Diabetes (H)      History of blood transfusion      Hypertension      Past Surgical History:   Procedure Laterality Date     HERNIORRHAPHY UMBILICAL N/A 11/11/2021    Procedure: HERNIORRHAPHY, UMBILICAL, OPEN;  Surgeon: Costa Malave MD;  Location: [facility] OR       PHYSICAL EXAM  General appearance- healthy, alert, and in no distress.  Neck- Neck is supple without obvious adenopathy.  Lungs- Respiratory effort unlabored.  Gait- Normal.  BMI 35  Abdomen - soft non distended, non tender with well healed maurice umbilical scar, did not appreciate hernia defect on today's exam, some diastasis appreciated.  CT done 2/10/22  IMPRESSION:   1. TIPS appears patent and appropriately positioned.  2. Cirrhosis and signs of portal hypertension including splenomegaly  and ascites.  3. Moderate colonic stool burden.   Hernia not noted.    Impression:   Possible recurrence umbilical hernia  Vs diastasis.  Recommend:  CT to define anatomy  Binder to use when in pain  Needs to loss weight to BM! 30 before  surgery considered.  I will call with results.  \"Total time = 30 minutes, spent on the date of encounter doing chart review, history and physical, documentation, patient education, and any further activity as noted above.           "

## 2022-03-15 NOTE — PATIENT INSTRUCTIONS
You met with Dr. Price Dickson.      Today's visit instructions:    Dr. Dickson has ordered a CT scan. We will call you with those results and recommendations. In the meantime, wear an abdominal binder during waking hours to help with discomfort. Also, please work on weight loss to a goal weight of 225lb or BMI of 30.        If you have questions please contact Lianna RN or [place] RN during regular clinic hours, Monday through Friday 7:30 AM - 4:00 PM, or you can contact us via [employer] at anytime.       If you have urgent needs after-hours, weekends, or holidays please call the hospital at [phone] and ask to speak with our on-call General Surgery Team.    Appointment [phone]  Nurse Advice (Lianna or [place]): [phone]   Surgery Scheduler ([place]): [phone]  Fax: [phone]

## 2022-03-15 NOTE — LETTER
Date:March 15, 2022      Provider requested that no letter be sent. Do not send.       [facility]

## 2022-03-15 NOTE — LETTER
"3/15/2022       RE: Florian Nowak  [address]  [place] [facility] [address]     Dear Colleague,    Thank you for referring your patient, Florian Nowak, to the [facility] [place] at [facility]. Please see a copy of my visit note below.    Florian Nowak is status post:  11/11/2021  Herniorrhaphy umbilical (N/A) Procedure: HERNIORRHAPHY, UMBILICAL, OPEN;  Surgeon: Costa Malave MD;  Location: [facility] OR  Surgery without complications.  Post-op course without complications.  Past week noted significant pain at umbilicus with bulge.  No GI symptoms  Liver disease improved with Patient now off transplant list.  No tobacco, no diabetes, no immunosuppression.    Past Medical History:   Diagnosis Date     Diabetes (H)      History of blood transfusion      Hypertension      Past Surgical History:   Procedure Laterality Date     HERNIORRHAPHY UMBILICAL N/A 11/11/2021    Procedure: HERNIORRHAPHY, UMBILICAL, OPEN;  Surgeon: Costa Malave MD;  Location: [facility] OR       PHYSICAL EXAM  General appearance- healthy, alert, and in no distress.  Neck- Neck is supple without obvious adenopathy.  Lungs- Respiratory effort unlabored.  Gait- Normal.  BMI 35  Abdomen - soft non distended, non tender with well healed maurice umbilical scar, did not appreciate hernia defect on today's exam, some diastasis appreciated.  CT done 2/10/22  IMPRESSION:   1. TIPS appears patent and appropriately positioned.  2. Cirrhosis and signs of portal hypertension including splenomegaly  and ascites.  3. Moderate colonic stool burden.   Hernia not noted.    Impression:   Possible recurrence umbilical hernia  Vs diastasis.  Recommend:  CT to define anatomy  Binder to use when in pain  Needs to loss weight to BM! 30 before  surgery considered.  I will call with results.  \"Total time = 30 minutes, spent on the date of encounter doing chart review, history and " physical, documentation, patient education, and any further activity as noted above.               Again, thank you for allowing me to participate in the care of your patient.      Sincerely,    Price Dickson MD

## 2022-03-18 ENCOUNTER — LAB (OUTPATIENT)
Dept: LAB | Facility: CLINIC | Age: 42
End: 2022-03-18
Payer: COMMERCIAL

## 2022-03-18 DIAGNOSIS — K70.31 ALCOHOLIC CIRRHOSIS OF LIVER WITH ASCITES (H): ICD-10-CM

## 2022-03-18 DIAGNOSIS — D62 ACUTE BLOOD LOSS ANEMIA: ICD-10-CM

## 2022-03-18 LAB
ALBUMIN SERPL-MCNC: 3.1 G/DL (ref 3.5–5)
ALP SERPL-CCNC: 129 U/L (ref 45–120)
ALT SERPL W P-5'-P-CCNC: 23 U/L (ref 0–45)
ANION GAP SERPL CALCULATED.3IONS-SCNC: 7 MMOL/L (ref 5–18)
AST SERPL W P-5'-P-CCNC: 47 U/L (ref 0–40)
BILIRUB DIRECT SERPL-MCNC: 0.4 MG/DL
BILIRUB SERPL-MCNC: 1 MG/DL (ref 0–1)
BUN SERPL-MCNC: 17 MG/DL (ref 8–22)
CALCIUM SERPL-MCNC: 8.7 MG/DL (ref 8.5–10.5)
CHLORIDE BLD-SCNC: 104 MMOL/L (ref 98–107)
CO2 SERPL-SCNC: 26 MMOL/L (ref 22–31)
CREAT SERPL-MCNC: 0.92 MG/DL (ref 0.7–1.3)
ERYTHROCYTE [DISTWIDTH] IN BLOOD BY AUTOMATED COUNT: 15.3 % (ref 10–15)
GFR SERPL CREATININE-BSD FRML MDRD: >90 ML/MIN/1.73M2
GLUCOSE BLD-MCNC: 89 MG/DL (ref 70–125)
HCT VFR BLD AUTO: 28 % (ref 40–53)
HGB BLD-MCNC: 9.3 G/DL (ref 13.3–17.7)
INR PPP: 1.33 (ref 0.85–1.15)
MCH RBC QN AUTO: 27.4 PG (ref 26.5–33)
MCHC RBC AUTO-ENTMCNC: 33.2 G/DL (ref 31.5–36.5)
MCV RBC AUTO: 82 FL (ref 78–100)
PLATELET # BLD AUTO: 119 10E3/UL (ref 150–450)
POTASSIUM BLD-SCNC: 3.7 MMOL/L (ref 3.5–5)
PROT SERPL-MCNC: 6.7 G/DL (ref 6–8)
RBC # BLD AUTO: 3.4 10E6/UL (ref 4.4–5.9)
SODIUM SERPL-SCNC: 137 MMOL/L (ref 136–145)
WBC # BLD AUTO: 4.1 10E3/UL (ref 4–11)

## 2022-03-18 PROCEDURE — 82248 BILIRUBIN DIRECT: CPT

## 2022-03-18 PROCEDURE — 85027 COMPLETE CBC AUTOMATED: CPT

## 2022-03-18 PROCEDURE — 36415 COLL VENOUS BLD VENIPUNCTURE: CPT

## 2022-03-18 PROCEDURE — 83550 IRON BINDING TEST: CPT

## 2022-03-18 PROCEDURE — 82105 ALPHA-FETOPROTEIN SERUM: CPT

## 2022-03-18 PROCEDURE — 80053 COMPREHEN METABOLIC PANEL: CPT

## 2022-03-18 PROCEDURE — 85610 PROTHROMBIN TIME: CPT

## 2022-03-22 ENCOUNTER — VIRTUAL VISIT (OUTPATIENT)
Dept: GASTROENTEROLOGY | Facility: CLINIC | Age: 42
End: 2022-03-22
Attending: STUDENT IN AN ORGANIZED HEALTH CARE EDUCATION/TRAINING PROGRAM
Payer: COMMERCIAL

## 2022-03-22 DIAGNOSIS — D62 ACUTE BLOOD LOSS ANEMIA: Primary | ICD-10-CM

## 2022-03-22 DIAGNOSIS — Z95.828 S/P TIPS (TRANSJUGULAR INTRAHEPATIC PORTOSYSTEMIC SHUNT): ICD-10-CM

## 2022-03-22 DIAGNOSIS — K70.31 ALCOHOLIC CIRRHOSIS OF LIVER WITH ASCITES (H): ICD-10-CM

## 2022-03-22 LAB
IRON SATN MFR SERPL: 11 % (ref 15–46)
IRON SERPL-MCNC: 48 UG/DL (ref 35–180)
TIBC SERPL-MCNC: 431 UG/DL (ref 240–430)

## 2022-03-22 PROCEDURE — 99214 OFFICE O/P EST MOD 30 MIN: CPT | Mod: 95 | Performed by: STUDENT IN AN ORGANIZED HEALTH CARE EDUCATION/TRAINING PROGRAM

## 2022-03-22 ASSESSMENT — PAIN SCALES - GENERAL: PAINLEVEL: NO PAIN (0)

## 2022-03-22 NOTE — LETTER
3/22/2022         RE: Florian Nowak  [address]  [place] [facility] [id number]        Dear Colleague,    Thank you for referring your patient, Florian Nowak, to the [facility] [place]. Please see a copy of my visit note below.    Florian is a 41 year old who is being evaluated via a billable video visit.      What phone number would you like to be contacted at? [phone]  How would you like to obtain your AVS? MyChart    Start Time 8:07  End Time 8:27  Doximity  Video    [facility] Return Patient Visit    Date of Visit: 3/22/2022    Reason for referral: Alcohol related liver disease    Subjective: Mr. Nowak is a 41 year old man with a history of AUD, alcohol related cirrhosis, who presents for follow up of his decompensated cirrhosis    Initial History:     He was told he had KANG in his 30s while living in [place]. Was around 300 lbs then. At that time was drinking 5 drinks 5 days a week. Ferritin was elevated, did not get HC testing.     He reports he would drink 5 drinks 5 days a week roughly. He would have some periods of sobriety for a few months when trying to quit. Spring 2020 cut back to 2 glasses of wine a day. Was not feeling well, stopping drinking in October and then suddenly got bloated. Presented to urgent care 10/2020 found to have new ascites. BR was 5 at that time.     First few months could go 2-4 weeks between paracentesis, had to increase frequency. In May K noted to be high with DELLA (creatinine 3.4) - was admitted for this. Was taking hydrochlorothiazide, lisinopril and BB at that time. After this was getting twice a week paracentesis, sometimes up to 10 L. He does not eat processed foods, has cut back on his salt and now getting rob every 5-6 days, 6-10 liters with albumin replacement. Sent for cell count every time, no history of SBP. Not restricting fluid specifically because gets light headed with this, does  not think he is drinking too much. Taking lasix 80 mg daily, dose increased a few weeks ago.     He has never done alcohol counseling. No history of DUIs or trouble at work but there was an instance where someone thought he smelled like alcohol, had to get tested and that was negative. He is committed to never drinking again, does not have cravings to drink.     Painful leg tremors/shaking/cramping at night - causing sleep deprivation. Started magnesium and iron for this. Having issues with insomnia.     No history of HE, variceal bleeding. Had an EGD 6/2021 that showed grade 2 EVs with red whales, banded. Also severe PHG.     Had negative hepatitis B and C testing in the past. Getting vaccinated against hepatitis A/B.     Admission for SBP 8/27/2021 - presented with abdominal pain and chills. Found to have SBP and strep salivarius bacteremia. Treated with IV ceftriaxone, then transitioned to augmentin, then cipro ppx. Hospital course complicated by DELLA (creatinine 1.95), got IV albumin, creatinine  improved on discharge to 1.19. Na stable in the mid 120s.    Admission for variceal bleeding 9/4-7. Was at home with daughter on the weekend - felt really dizzy and lightheaded, passed out. Underwent an EGD - was banded x 5 on 9/5. Taking augment to complete this course for SBP ppx and then will go back to cipro daily. Hospital course c/b DELLA - creatinine 1.9, down to 1.6 on discharge. Taking lasix 40 mg daily.     Admitted for variceal bleeding the end of September. Transferred for TIPS 9/22/2021 - gradient 23 -> 9 mmHG.     Having issues with his umbilical hernia - requiring ED visit to reduce this. Unfortunately 11/11/2021 presented with irreducible hernia and required open umbilical herniorrhaphy. Had small amount of turbid ascites during surgery - culture without growth, no cell count sent    Interval Events:  - Last paracentesis 12/2021 with 1.8 L removed. Attempted para in with no fluid to remove.  Taking Lasix 80  mg twice a day.  Had gynecomastia with spironolactone.  Still having some sock line edema, otherwise legs are overall better.  Given that his ascites has gotten better, decided to hold on the need for TIPS revision.  He did talk to Dr. Caro about extending his TIPS in the past.  - Hemoglobin still low, not taking his iron pills regularly.  Denies overt blood loss.  States his stomach be queasy at times.  - Saw surgery recently for concern for recurrent umbilical hernia, they are getting a CT scan later this week    ROS: 14 point ROS negative except for positives noted in HPI.    PMHx:  - AUD  - Alcohol related cirrhosis c/b refractory ascites/SBP, variceal bleeding  - Asthma  - HTN    PSHx:  - Back surgery microdisectomy    FamHx:  No family history of liver disease, liver cancer    SocHx:  Social History     Socioeconomic History     Marital status:      Spouse name: Not on file     Number of children: Not on file     Years of education: Not on file     Highest education level: Not on file   Occupational History     Not on file   Tobacco Use     Smoking status: Former Smoker     Smokeless tobacco: Never Used     Tobacco comment: College   Substance and Sexual Activity     Alcohol use: Not Currently     Comment: Quit ETOH 10/2020     Drug use: Not Currently     Sexual activity: Not on file   Other Topics Concern     Parent/sibling w/ CABG, MI or angioplasty before 65F 55M? 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   No NSAIDs    Allergies:  Allergies   Allergen Reactions     Ketorolac Tromethamine Other (See Comments)       Objective:  There were no vitals taken for this visit.  Constitutional: pleasant man in NAD  Eyes: non icteric  Respiratory: Normal respiratory excursion   Abd: Non distended, incision c/d/i  Skin: No jaundice  Psychiatric: normal mood and orientation    Labs:  Last Comprehensive Metabolic Panel:  Sodium   Date Value Ref Range Status   03/18/2022 137 136 - 145 mmol/L Final     Potassium   Date Value Ref Range Status   03/18/2022 3.7 3.5 - 5.0 mmol/L Final     Chloride   Date Value Ref Range Status   03/18/2022 104 98 - 107 mmol/L Final     Carbon Dioxide (CO2)   Date Value Ref Range Status   03/18/2022 26 22 - 31 mmol/L Final     Anion Gap   Date Value Ref Range Status   03/18/2022 7 5 - 18 mmol/L Final     Glucose   Date Value Ref Range Status   03/18/2022 89 70 - 125 mg/dL Final     Urea Nitrogen   Date Value Ref Range Status   03/18/2022 17 8 - 22 mg/dL Final     Creatinine   Date Value Ref Range Status   03/18/2022 0.92 0.70 - 1.30 mg/dL Final     GFR Estimate   Date Value Ref Range Status   03/18/2022 >90 >60 mL/min/1.73m2 Final     Comment:     Effective December 21, 2021 eGFRcr in adults is calculated using the 2021 CKD-EPI creatinine equation which includes age and gender (Brijesh naranjo al., NEJM, DOI: 10.1056/VHYFao3992691)     Calcium   Date Value Ref Range Status   03/18/2022 8.7 8.5 - 10.5 mg/dL Final     Bilirubin Total   Date Value Ref Range Status   03/18/2022 1.0 0.0 - 1.0 mg/dL Final     Alkaline Phosphatase   Date Value Ref Range  Status   03/18/2022 129 (H) 45 - 120 U/L Final     ALT   Date Value Ref Range Status   03/18/2022 23 0 - 45 U/L Final     AST   Date Value Ref Range Status   03/18/2022 47 (H) 0 - 40 U/L Final       Lab Results   Component Value Date    WBC 8.5 08/18/2021     Lab Results   Component Value Date    RBC 3.26 08/18/2021     Lab Results   Component Value Date    HGB 9.7 08/18/2021     Lab Results   Component Value Date    HCT 29.0 08/18/2021     Lab Results   Component Value Date    MCV 89 08/18/2021     Lab Results   Component Value Date    MCH 29.8 08/18/2021     Lab Results   Component Value Date    MCHC 33.4 08/18/2021     Lab Results   Component Value Date    RDW 14.2 08/18/2021     Lab Results   Component Value Date     08/18/2021       INR   Date Value Ref Range Status   03/18/2022 1.33 (H) 0.85 - 1.15 Final     Last Comprehensive Metabolic Panel:  Sodium   Date Value Ref Range Status   03/18/2022 137 136 - 145 mmol/L Final     Potassium   Date Value Ref Range Status   03/18/2022 3.7 3.5 - 5.0 mmol/L Final     Chloride   Date Value Ref Range Status   03/18/2022 104 98 - 107 mmol/L Final     Carbon Dioxide (CO2)   Date Value Ref Range Status   03/18/2022 26 22 - 31 mmol/L Final     Anion Gap   Date Value Ref Range Status   03/18/2022 7 5 - 18 mmol/L Final     Glucose   Date Value Ref Range Status   03/18/2022 89 70 - 125 mg/dL Final     Urea Nitrogen   Date Value Ref Range Status   03/18/2022 17 8 - 22 mg/dL Final     Creatinine   Date Value Ref Range Status   03/18/2022 0.92 0.70 - 1.30 mg/dL Final     GFR Estimate   Date Value Ref Range Status   03/18/2022 >90 >60 mL/min/1.73m2 Final     Comment:     Effective December 21, 2021 eGFRcr in adults is calculated using the 2021 CKD-EPI creatinine equation which includes age and gender (Brijesh naranjo al., NEJM, DOI: 10.1056/TJLKci4275353)     Calcium   Date Value Ref Range Status   03/18/2022 8.7 8.5 - 10.5 mg/dL Final     Bilirubin Total   Date Value Ref Range  Status   03/18/2022 1.0 0.0 - 1.0 mg/dL Final     Alkaline Phosphatase   Date Value Ref Range Status   03/18/2022 129 (H) 45 - 120 U/L Final     ALT   Date Value Ref Range Status   03/18/2022 23 0 - 45 U/L Final     AST   Date Value Ref Range Status   03/18/2022 47 (H) 0 - 40 U/L Final             Lab Results   Component Value Date    WBC 8.3 09/10/2021     Lab Results   Component Value Date    RBC 2.64 09/10/2021     Lab Results   Component Value Date    HGB 7.9 09/10/2021     Lab Results   Component Value Date    HCT 24.3 09/10/2021     No components found for: MCT  Lab Results   Component Value Date    MCV 92 09/10/2021     Lab Results   Component Value Date    MCH 29.9 09/10/2021     Lab Results   Component Value Date    MCHC 32.5 09/10/2021     Lab Results   Component Value Date    RDW 14.4 09/10/2021     Lab Results   Component Value Date     09/10/2021     MELD-Na score: 9 at 3/18/2022  1:28 PM  MELD score: 9 at 3/18/2022  1:28 PM  Calculated from:  Serum Creatinine: 0.92 mg/dL (Using min of 1 mg/dL) at 3/18/2022  1:28 PM  Serum Sodium: 137 mmol/L at 3/18/2022  1:28 PM  Total Bilirubin: 1.0 mg/dL at 3/18/2022  1:28 PM  INR(ratio): 1.33 at 3/18/2022  1:28 PM  Age: 41 years    Imaging:    CT AP 2/10/2022    FINDINGS:     Abdomen and pelvis:   Cirrhotic morphology of the liver. No discrete hepatic lesion is  visualized. TIPS appears patent. No calcified gallstones or  gallbladder wall thickening. Splenomegaly measuring 16.7 cm in  craniocaudal dimensions. The adrenal glands, pancreas and kidneys are  unremarkable. No hydronephrosis or hydroureter. No pelvic mass.     The small and large bowel is normal in caliber without evidence of a  bowel obstruction. Moderate colonic stool burden. The major  intra-abdominal vasculature is patent and normal in caliber. No  enlarged abdominal or pelvic lymph nodes. Moderate volume of abdominal  ascites. No intra-abdominal free air.     Lung bases: Bibasilar atelectasis.  No pleural effusion. Heart size is  normal. No pericardial effusion.     Bones and soft tissues: No acute or aggressive appearing osseous  lesion. Degenerative changes of the spine.     IMPRESSION:   1. TIPS appears patent and appropriately positioned.  2. Cirrhosis and signs of portal hypertension including splenomegaly  and ascites.  3. Moderate colonic stool burden.     CT AP 8/27/2021    ABDOMEN/PELVIS:     No free intraperitoneal air. Large volume ascites. Ascites extends into a noninflamed umbilical ventral wall hernia.     Heterogeneous and cirrhotic appearance of the liver. The portal venous system appears patent. Portosystemic collateralization includes but is not limited to gastroesophageal varices. The spleen is mildly enlarged at 16 cm oblique craniocaudad dimension.     Gallbladder, pancreas, adrenals, kidneys, and pelvic organs are unremarkable.     No bowel obstruction. Mild wall thickening of multiple small bowel loops and portions of the colon may reflect portal enteropathy and colopathy. The appendix is normal.     Small lymph nodes are scattered throughout the upper abdomen, mesentery, and retroperitoneum. These are nonspecific, but likely reactive in a patient with chronic liver disease.     MUSCULOSKELETAL: No aggressive appearing bone lesion.     IMPRESSION:     1. Heterogeneous and cirrhotic appearing liver with portosystemic collateralization and mild splenomegaly.   2. Large volume ascites.   3. Mild wall thickening of multiple small bowel loops and portions of the colon likely reflects portal enteropathy and colopathy, though other types of enteritis and colitis are not excluded.   4. Noninflamed umbilical hernia containing ascites.    Endoscopy: Reviewed in EHR    Independently reviewed labs and imaging.      Assessment/Plan: Mr. Nowak is a 41 year old man with a history of AUD, alcohol related cirrhosis c/b refractory ascites/SBP, recurrent variceal bleeding, now s/p TIPS 9/22.      Ascites improved post TIPS, but had issues with umbilical hernia after his TIPS and on 11/11 was unable to reduce and required surgical repair.     He has had slow but continued improvement in his ascites post TIPS.   He is taking Lasix 40 mg twice a day for his ascites and the swelling in his legs.  Have discussed with IR TIPS revision, I decided to hold off as ascites has mostly resolved and TIPS appears patent on imaging.  In addition feel he still requires diuretics for his lower extremity swelling    -Lasix 80 mg twice a day  -We will assess his ascites on his upcoming CT scan, if more than trace we will increase his diuretics.  Ongoing discussions with IR about his TIPS. He is following with surgery for his umbilical hernia  -Continue low sodium diet  -Followed by complex care management RN  -Discussed complete sobriety from alcohol, including NA beers  -HCC screening CT February 22 2 without any masses, add on AFP  -Recommend taking daily iron.  We will add iron studies to his most recent labs.  Recommend rechecking CBC in 6 weeks given persistent anemia after his variceal bleed.  Recommend screening colonoscopy given he is 41 and his persistent anemia.    RTC 4 months    Cadence Murray MD MS  Hepatology/Liver Transplant  [facility]

## 2022-03-22 NOTE — PROGRESS NOTES
Florian is a 41 year old who is being evaluated via a billable video visit.      What phone number would you like to be contacted at? [phone]  How would you like to obtain your AVS? Bello    Start Time 8:07  End Time 8:27  Doximity  Video    [facility] Return Patient Visit    Date of Visit: 3/22/2022    Reason for referral: Alcohol related liver disease    Subjective: Mr. Nowak is a 41 year old man with a history of AUD, alcohol related cirrhosis, who presents for follow up of his decompensated cirrhosis    Initial History:     He was told he had KANG in his 30s while living in [place]. Was around 300 lbs then. At that time was drinking 5 drinks 5 days a week. Ferritin was elevated, did not get HC testing.     He reports he would drink 5 drinks 5 days a week roughly. He would have some periods of sobriety for a few months when trying to quit. Spring 2020 cut back to 2 glasses of wine a day. Was not feeling well, stopping drinking in October and then suddenly got bloated. Presented to urgent care 10/2020 found to have new ascites. BR was 5 at that time.     First few months could go 2-4 weeks between paracentesis, had to increase frequency. In May K noted to be high with DELLA (creatinine 3.4) - was admitted for this. Was taking hydrochlorothiazide, lisinopril and BB at that time. After this was getting twice a week paracentesis, sometimes up to 10 L. He does not eat processed foods, has cut back on his salt and now getting rob every 5-6 days, 6-10 liters with albumin replacement. Sent for cell count every time, no history of SBP. Not restricting fluid specifically because gets light headed with this, does not think he is drinking too much. Taking lasix 80 mg daily, dose increased a few weeks ago.     He has never done alcohol counseling. No history of DUIs or trouble at work but there was an instance where someone thought he smelled like alcohol, had to get tested and that was  negative. He is committed to never drinking again, does not have cravings to drink.     Painful leg tremors/shaking/cramping at night - causing sleep deprivation. Started magnesium and iron for this. Having issues with insomnia.     No history of HE, variceal bleeding. Had an EGD 6/2021 that showed grade 2 EVs with red whales, banded. Also severe PHG.     Had negative hepatitis B and C testing in the past. Getting vaccinated against hepatitis A/B.     Admission for SBP 8/27/2021 - presented with abdominal pain and chills. Found to have SBP and strep salivarius bacteremia. Treated with IV ceftriaxone, then transitioned to augmentin, then cipro ppx. Hospital course complicated by DELLA (creatinine 1.95), got IV albumin, creatinine  improved on discharge to 1.19. Na stable in the mid 120s.    Admission for variceal bleeding 9/4-7. Was at home with daughter on the weekend - felt really dizzy and lightheaded, passed out. Underwent an EGD - was banded x 5 on 9/5. Taking augment to complete this course for SBP ppx and then will go back to cipro daily. Hospital course c/b DELLA - creatinine 1.9, down to 1.6 on discharge. Taking lasix 40 mg daily.     Admitted for variceal bleeding the end of September. Transferred for TIPS 9/22/2021 - gradient 23 -> 9 mmHG.     Having issues with his umbilical hernia - requiring ED visit to reduce this. Unfortunately 11/11/2021 presented with irreducible hernia and required open umbilical herniorrhaphy. Had small amount of turbid ascites during surgery - culture without growth, no cell count sent    Interval Events:  - Last paracentesis 12/2021 with 1.8 L removed. Attempted para in with no fluid to remove.  Taking Lasix 80 mg twice a day.  Had gynecomastia with spironolactone.  Still having some sock line edema, otherwise legs are overall better.  Given that his ascites has gotten better, decided to hold on the need for TIPS revision.  He did talk to Dr. Caro about extending his TIPS in  the past.  - Hemoglobin still low, not taking his iron pills regularly.  Denies overt blood loss.  States his stomach be queasy at times.  - Saw surgery recently for concern for recurrent umbilical hernia, they are getting a CT scan later this week    ROS: 14 point ROS negative except for positives noted in HPI.    PMHx:  - AUD  - Alcohol related cirrhosis c/b refractory ascites/SBP, variceal bleeding  - Asthma  - HTN    PSHx:  - Back surgery microdisectomy    FamHx:  No family history of liver disease, liver cancer    SocHx:  Social History     Socioeconomic History     Marital status:      Spouse name: Not on file     Number of children: Not on file     Years of education: Not on file     Highest education level: Not on file   Occupational History     Not on file   Tobacco Use     Smoking status: Former Smoker     Smokeless tobacco: Never Used     Tobacco comment: College   Substance and Sexual Activity     Alcohol use: Not Currently     Comment: Quit ETOH 10/2020     Drug use: Not Currently     Sexual activity: Not on file   Other Topics Concern     Parent/sibling w/ CABG, MI or angioplasty before 65F 55M? 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   No NSAIDs    Allergies:  Allergies   Allergen Reactions     Ketorolac Tromethamine Other (See Comments)       Objective:  There were no vitals taken for this visit.  Constitutional: pleasant man in NAD  Eyes: non icteric  Respiratory: Normal respiratory excursion   Abd: Non distended, incision c/d/i  Skin: No jaundice  Psychiatric: normal mood and orientation    Labs:  Last Comprehensive Metabolic Panel:  Sodium   Date Value Ref Range Status   03/18/2022 137 136 - 145 mmol/L Final     Potassium   Date Value Ref Range Status   03/18/2022 3.7 3.5 - 5.0 mmol/L Final     Chloride   Date Value Ref Range Status   03/18/2022 104 98 - 107 mmol/L Final     Carbon Dioxide (CO2)   Date Value Ref Range Status   03/18/2022 26 22 - 31 mmol/L Final     Anion Gap   Date Value Ref Range Status   03/18/2022 7 5 - 18 mmol/L Final     Glucose   Date Value Ref Range Status   03/18/2022 89 70 - 125 mg/dL Final     Urea Nitrogen   Date Value Ref Range Status   03/18/2022 17 8 - 22 mg/dL Final     Creatinine   Date Value Ref Range Status   03/18/2022 0.92 0.70 - 1.30 mg/dL Final     GFR Estimate   Date Value Ref Range Status   03/18/2022 >90 >60 mL/min/1.73m2 Final     Comment:     Effective December 21, 2021 eGFRcr in adults is calculated using the 2021 CKD-EPI creatinine equation which includes age and gender (Brijesh naranjo al., NE, DOI: 10.1056/IBWRae1279160)     Calcium   Date Value Ref Range Status   03/18/2022 8.7 8.5 - 10.5 mg/dL Final     Bilirubin Total   Date Value Ref Range Status   03/18/2022 1.0 0.0 - 1.0 mg/dL Final     Alkaline Phosphatase   Date Value Ref Range Status   03/18/2022 129 (H) 45 - 120 U/L Final     ALT   Date Value Ref Range Status   03/18/2022 23 0 - 45 U/L Final     AST   Date Value Ref Range Status   03/18/2022 47 (H) 0 - 40 U/L Final       Lab Results   Component Value Date    WBC 8.5 08/18/2021     Lab Results   Component  Value Date    RBC 3.26 08/18/2021     Lab Results   Component Value Date    HGB 9.7 08/18/2021     Lab Results   Component Value Date    HCT 29.0 08/18/2021     Lab Results   Component Value Date    MCV 89 08/18/2021     Lab Results   Component Value Date    MCH 29.8 08/18/2021     Lab Results   Component Value Date    MCHC 33.4 08/18/2021     Lab Results   Component Value Date    RDW 14.2 08/18/2021     Lab Results   Component Value Date     08/18/2021       INR   Date Value Ref Range Status   03/18/2022 1.33 (H) 0.85 - 1.15 Final     Last Comprehensive Metabolic Panel:  Sodium   Date Value Ref Range Status   03/18/2022 137 136 - 145 mmol/L Final     Potassium   Date Value Ref Range Status   03/18/2022 3.7 3.5 - 5.0 mmol/L Final     Chloride   Date Value Ref Range Status   03/18/2022 104 98 - 107 mmol/L Final     Carbon Dioxide (CO2)   Date Value Ref Range Status   03/18/2022 26 22 - 31 mmol/L Final     Anion Gap   Date Value Ref Range Status   03/18/2022 7 5 - 18 mmol/L Final     Glucose   Date Value Ref Range Status   03/18/2022 89 70 - 125 mg/dL Final     Urea Nitrogen   Date Value Ref Range Status   03/18/2022 17 8 - 22 mg/dL Final     Creatinine   Date Value Ref Range Status   03/18/2022 0.92 0.70 - 1.30 mg/dL Final     GFR Estimate   Date Value Ref Range Status   03/18/2022 >90 >60 mL/min/1.73m2 Final     Comment:     Effective December 21, 2021 eGFRcr in adults is calculated using the 2021 CKD-EPI creatinine equation which includes age and gender (Brijesh et al., NEJM, DOI: 10.1056/FMIYda8882144)     Calcium   Date Value Ref Range Status   03/18/2022 8.7 8.5 - 10.5 mg/dL Final     Bilirubin Total   Date Value Ref Range Status   03/18/2022 1.0 0.0 - 1.0 mg/dL Final     Alkaline Phosphatase   Date Value Ref Range Status   03/18/2022 129 (H) 45 - 120 U/L Final     ALT   Date Value Ref Range Status   03/18/2022 23 0 - 45 U/L Final     AST   Date Value Ref Range Status   03/18/2022 47 (H) 0 - 40 U/L Final              Lab Results   Component Value Date    WBC 8.3 09/10/2021     Lab Results   Component Value Date    RBC 2.64 09/10/2021     Lab Results   Component Value Date    HGB 7.9 09/10/2021     Lab Results   Component Value Date    HCT 24.3 09/10/2021     No components found for: MCT  Lab Results   Component Value Date    MCV 92 09/10/2021     Lab Results   Component Value Date    MCH 29.9 09/10/2021     Lab Results   Component Value Date    MCHC 32.5 09/10/2021     Lab Results   Component Value Date    RDW 14.4 09/10/2021     Lab Results   Component Value Date     09/10/2021     MELD-Na score: 9 at 3/18/2022  1:28 PM  MELD score: 9 at 3/18/2022  1:28 PM  Calculated from:  Serum Creatinine: 0.92 mg/dL (Using min of 1 mg/dL) at 3/18/2022  1:28 PM  Serum Sodium: 137 mmol/L at 3/18/2022  1:28 PM  Total Bilirubin: 1.0 mg/dL at 3/18/2022  1:28 PM  INR(ratio): 1.33 at 3/18/2022  1:28 PM  Age: 41 years    Imaging:    CT AP 2/10/2022    FINDINGS:     Abdomen and pelvis:   Cirrhotic morphology of the liver. No discrete hepatic lesion is  visualized. TIPS appears patent. No calcified gallstones or  gallbladder wall thickening. Splenomegaly measuring 16.7 cm in  craniocaudal dimensions. The adrenal glands, pancreas and kidneys are  unremarkable. No hydronephrosis or hydroureter. No pelvic mass.     The small and large bowel is normal in caliber without evidence of a  bowel obstruction. Moderate colonic stool burden. The major  intra-abdominal vasculature is patent and normal in caliber. No  enlarged abdominal or pelvic lymph nodes. Moderate volume of abdominal  ascites. No intra-abdominal free air.     Lung bases: Bibasilar atelectasis. No pleural effusion. Heart size is  normal. No pericardial effusion.     Bones and soft tissues: No acute or aggressive appearing osseous  lesion. Degenerative changes of the spine.     IMPRESSION:   1. TIPS appears patent and appropriately positioned.  2. Cirrhosis and signs of portal  hypertension including splenomegaly  and ascites.  3. Moderate colonic stool burden.     CT AP 8/27/2021    ABDOMEN/PELVIS:     No free intraperitoneal air. Large volume ascites. Ascites extends into a noninflamed umbilical ventral wall hernia.     Heterogeneous and cirrhotic appearance of the liver. The portal venous system appears patent. Portosystemic collateralization includes but is not limited to gastroesophageal varices. The spleen is mildly enlarged at 16 cm oblique craniocaudad dimension.     Gallbladder, pancreas, adrenals, kidneys, and pelvic organs are unremarkable.     No bowel obstruction. Mild wall thickening of multiple small bowel loops and portions of the colon may reflect portal enteropathy and colopathy. The appendix is normal.     Small lymph nodes are scattered throughout the upper abdomen, mesentery, and retroperitoneum. These are nonspecific, but likely reactive in a patient with chronic liver disease.     MUSCULOSKELETAL: No aggressive appearing bone lesion.     IMPRESSION:     1. Heterogeneous and cirrhotic appearing liver with portosystemic collateralization and mild splenomegaly.   2. Large volume ascites.   3. Mild wall thickening of multiple small bowel loops and portions of the colon likely reflects portal enteropathy and colopathy, though other types of enteritis and colitis are not excluded.   4. Noninflamed umbilical hernia containing ascites.    Endoscopy: Reviewed in EHR    Independently reviewed labs and imaging.      Assessment/Plan: Mr. Nowak is a 41 year old man with a history of AUD, alcohol related cirrhosis c/b refractory ascites/SBP, recurrent variceal bleeding, now s/p TIPS 9/22.     Ascites improved post TIPS, but had issues with umbilical hernia after his TIPS and on 11/11 was unable to reduce and required surgical repair.     He has had slow but continued improvement in his ascites post TIPS.   He is taking Lasix 40 mg twice a day for his ascites and the swelling in  his legs.  Have discussed with IR TIPS revision, I decided to hold off as ascites has mostly resolved and TIPS appears patent on imaging.  In addition feel he still requires diuretics for his lower extremity swelling    -Lasix 80 mg twice a day  -We will assess his ascites on his upcoming CT scan, if more than trace we will increase his diuretics.  Ongoing discussions with IR about his TIPS. He is following with surgery for his umbilical hernia  -Continue low sodium diet  -Followed by complex care management RN  -Discussed complete sobriety from alcohol, including NA beers  -HCC screening CT February 22 2 without any masses, add on AFP  -Recommend taking daily iron.  We will add iron studies to his most recent labs.  Recommend rechecking CBC in 6 weeks given persistent anemia after his variceal bleed.  Recommend screening colonoscopy given he is 41 and his persistent anemia.    RTC 4 months    Cadence Murray MD MS  Hepatology/Liver Transplant  [facility]

## 2022-03-23 LAB — AFP SERPL-MCNC: 3 NG/ML

## 2022-03-23 NOTE — PROGRESS NOTES
Pt had hepatology follow up on 3/22. Plan established at visit per Dr. Murray:    1) Continue lasix 80 mg 2 times daily  2) CT abdomen and pelvis on 3/24- Will increase diuretics if CT scan shows ascites   3) Low Na diet  4) Complete abstinence from alcohol (including NA beers)  5) Add on iron studies to labs and determine need for daily iron  6) Recheck CBC in 6 weeks given persistent anemia  7) Recommend screening colonoscopy  8) RTC in 4 months     Will check in with pt and review plan of care in 1-2 weeks.

## 2022-03-24 ENCOUNTER — PATIENT OUTREACH (OUTPATIENT)
Dept: SURGERY | Facility: CLINIC | Age: 42
End: 2022-03-24
Payer: COMMERCIAL

## 2022-03-24 ENCOUNTER — HOSPITAL ENCOUNTER (OUTPATIENT)
Dept: CT IMAGING | Facility: HOSPITAL | Age: 42
Discharge: HOME | End: 2022-03-24
Attending: SURGERY | Admitting: SURGERY
Payer: COMMERCIAL

## 2022-03-24 DIAGNOSIS — R52 PAIN: ICD-10-CM

## 2022-03-24 PROCEDURE — 250N000011 HC RX IP 250 OP 636: Performed by: SURGERY

## 2022-03-24 PROCEDURE — 74177 CT ABD & PELVIS W/CONTRAST: CPT

## 2022-03-24 RX ORDER — IOPAMIDOL 755 MG/ML
100 INJECTION, SOLUTION INTRAVASCULAR ONCE
Status: COMPLETED | OUTPATIENT
Start: 2022-03-24 | End: 2022-03-24

## 2022-03-24 RX ADMIN — IOPAMIDOL 100 ML: 755 INJECTION, SOLUTION INTRAVENOUS at 07:55

## 2022-03-24 NOTE — PROGRESS NOTES
CT ordered by Dr. Dickson resulted. Message sent to Dr. Dickson to review.    ADDENDUM  03/24/22  12:36 PM  Dr. Dickson reviewed the CT scan and called the patient with results.  No hernia noted.  Bulge most likely related to procedure and should improve over time.  All of the patients questions were answered.

## 2022-03-29 ENCOUNTER — TELEPHONE (OUTPATIENT)
Dept: GASTROENTEROLOGY | Facility: CLINIC | Age: 42
End: 2022-03-29
Payer: COMMERCIAL

## 2022-03-29 DIAGNOSIS — Z11.59 ENCOUNTER FOR SCREENING FOR OTHER VIRAL DISEASES: Primary | ICD-10-CM

## 2022-03-29 NOTE — TELEPHONE ENCOUNTER
Screening Questions  BlueKIND OF PREP RedLOCATION [review exclusion criteria] GreenSEDATION TYPE  1. Have you had a positive covid test in the last 90 days? No      2. Are you active on mychart? Yes     3. What insurance is in the chart? BCBS     3.   Ordering/Referring Provider: Cadence Murray MD     4. BMI 33.9 [BMI OVER 40-EXTENDED PREP]  If greater than 40 review exclusion criteria [PAC APPT IF @ [facility]]        5.  Respiratory Screening :  [If yes to any of the following HOSPITAL setting only]     Do you use daily home oxygen? no     Do you have mod to severe Obstructive Sleep Apnea? No   [OKAY @ [facility] SH PH RI]   Do you have Pulmonary Hypertension? No      Do you have UNCONTROLLED asthma? No         6.   Have you had a heart or lung transplant? No       7.   Are you currently on dialysis? No  [ If yes, G-PREP & HOSPITAL setting only]     8.   Do you have chronic kidney disease? No  [ If yes, G-PREP ]    9.   Have you had a stroke or Transient ischemic attack (TIA - aka  mini stroke ) within 6 months?  No  (If yes, please review exclusion criteria)    10.   In the past 6 months, have you had any heart related issues including cardiomyopathy or heart attack? No            If yes, did it require cardiac stenting or other implantable device?       11.   Do you have any implantable devices in your body (pacemaker, defib, LVAD)? no  (If yes, please review exclusion criteria)    12.   Do you take nitroglycerin? No            If yes, how often?   (if yes, HOSPITAL setting ONLY)    13.   Are you currently taking any blood thinners? No            [IF YES, INFORM PATIENT TO FOLLOW UP W/ ORDERING PROVIDER FOR BRIDGING INSTRUCTIONS]     14.   Do you have a diagnosis of diabetes?no    [ If yes, G-PREP ]    15.   [FEMALES] Are you currently pregnant?     If yes, how many weeks?     16.   Are you taking any prescription pain medications on a routine schedule?  No   [ If yes, EXTENDED PREP.] [If yes, MAC]    17.   Do you  have any chemical dependencies such as alcohol, street drugs, or methadone?  No  [If yes, MAC]    18.   Do you have any history of post-traumatic stress syndrome, severe anxiety or history of psychosis?  No   [If yes, MAC]    19.   Do you have a significant intellectual disability?  No  [If yes, MAC]    20.   Do you transfer independently?  Yes     21.  On a regular basis do you go 3-5 days between bowel movements? no   [ If yes, EXTENDED PREP.]    22.   Preferred LOCAL Pharmacy for Pre Prescription     [facility] #[id number] - [address] AT [place] [address] & [facility]      Scheduling Details      Caller : Florian Nowak  (Please ask for phone number if not scheduled by patient)    Type of Procedure Scheduled: colonoscopy   Which Colonoscopy Prep was Sent?: miralax  KHORUTS CF PATIENTS & GROEN'S PATIENTS NEEDS EXTENDED PREP  Surgeon: Terri  Date of Procedure: 4/28/2022  [place] [facility] [place] Type: MAC-per order  Conscious Sedation- Needs  for 6 hours after the procedure  MAC/General-Needs  for 24 hours after procedure    Pre-op Required at [facility] [place], [place] and OR for MAC sedation: no  (advise patient they will need a pre-op prior to procedure -)      Informed patient they will need an adult  yes   Cannot take any type of public or medical transportation alone    [facility]Pro[facility] or Externally: yes at [facility]    Confirmed Nurse will call to complete assessment yes     Additional comments: no

## 2022-04-12 ENCOUNTER — PATIENT OUTREACH (OUTPATIENT)
Dept: GASTROENTEROLOGY | Facility: CLINIC | Age: 42
End: 2022-04-12
Payer: COMMERCIAL

## 2022-04-12 DIAGNOSIS — R11.2 NAUSEA AND VOMITING, INTRACTABILITY OF VOMITING NOT SPECIFIED, UNSPECIFIED VOMITING TYPE: Primary | ICD-10-CM

## 2022-04-12 NOTE — PROGRESS NOTES
Pt stated that he continues to have abdominal pain but that it is tolerable. Pt had an appointment with general surgery on 3/15 and CT of abdomen and pelvis done to assess for hernia. Per Dr. Dickson's note, pt does not have a hernia and the abdominal bulge is most likely related to the surgery and should improve over time. Pt is wearing an abdominal binder as needed.    Pt continues to feel fatigued and reported intermittent nausea. Dr. Murray instructed pt to start iron pills at visit on 3/22 and wanted to recheck CBC in 6 weeks. Discussed that nausea could be caused by iron pills. Pt stated that he does not believe the iron pills are the source of the nausea as pt just started the pills on 3/31 and has been experiencing nausea for weeks. Pt denied hematemesis, hematochezia, and melena. Encouraged pt to continue taking the iron pills and pt will get CBC rechecked mid May. Pt has colonoscopy scheduled on 4/28 and endoscopy added on to colonoscopy per Dr. Murray.     Will check in with pt in 1-2 weeks. Pt verbalized understanding and is agreeable to plan of care.

## 2022-04-13 ENCOUNTER — TELEPHONE (OUTPATIENT)
Dept: GASTROENTEROLOGY | Facility: CLINIC | Age: 42
End: 2022-04-13
Payer: COMMERCIAL

## 2022-04-13 NOTE — TELEPHONE ENCOUNTER
EGD added to case, Gisela info sent        Staff Message:    ----- Message from Cadence Murray MD sent at 4/12/2022  3:05 PM CDT -----  Can we add this patient on for an EGD during the same sedation 4/28? He is end of the day so shouldn't affect other patients? I will place the order  ----- Message -----  From: Gege Flanagan RN  Sent: 4/12/2022  10:57 AM CDT  To: Cadence Murray MD    Hi Dr. Murray,     I just connected with pt. Pt stated that his nausea is better but still there. Per pt, the nausea can't be from the iron pills because he just started taking them. Denied hematemesis, hematochezia, and melena. Pt is open to doing an upper endoscopy at same time as colonoscopy if you think he should.     Thoughts?    Thanks,   Gege

## 2022-04-21 ENCOUNTER — TELEPHONE (OUTPATIENT)
Dept: GASTROENTEROLOGY | Facility: CLINIC | Age: 42
End: 2022-04-21

## 2022-04-21 DIAGNOSIS — Z01.812 PRE-PROCEDURE LAB EXAM: Primary | ICD-10-CM

## 2022-04-21 NOTE — TELEPHONE ENCOUNTER
Patient scheduled for colonoscopy/EGD on 4/28/22.     Covid test scheduled: 4/25/22    Arrival time: 1340    Facility location: [facility]     Sedation type: MAC     Indication for procedure: anemia, n/v    Anticoagulations? no     Bowel prep recommendation: Miralax/Magnesium citrate/Dulcolax     Pre visit planning completed.    Calli Rodriguez RN

## 2022-04-21 NOTE — TELEPHONE ENCOUNTER
Pre assessment questions completed for upcoming colonoscopy/EGD procedure scheduled on 4/28/22    COVID test scheduled 4/25/22    Reviewed procedural arrival time 1340 and facility location [facility].    Designated  policy reviewed. Instructed to have someone stay 24 hours post procedure.     Reviewed Miralax prep instructions with patient. No fiber/iron supplements or foods that contain nuts/seeds 7 days prior to procedure.     Patient verbalized understanding and had no questions or concerns at this time.    Calli Rodriguez RN

## 2022-04-27 ENCOUNTER — LAB (OUTPATIENT)
Dept: LAB | Facility: CLINIC | Age: 42
End: 2022-04-27
Payer: COMMERCIAL

## 2022-04-27 DIAGNOSIS — Z01.812 PRE-PROCEDURE LAB EXAM: ICD-10-CM

## 2022-04-27 LAB — SARS-COV-2 RNA RESP QL NAA+PROBE: NEGATIVE

## 2022-04-27 PROCEDURE — U0003 INFECTIOUS AGENT DETECTION BY NUCLEIC ACID (DNA OR RNA); SEVERE ACUTE RESPIRATORY SYNDROME CORONAVIRUS 2 (SARS-COV-2) (CORONAVIRUS DISEASE [COVID-19]), AMPLIFIED PROBE TECHNIQUE, MAKING USE OF HIGH THROUGHPUT TECHNOLOGIES AS DESCRIBED BY CMS-2020-01-R: HCPCS | Mod: 90 | Performed by: PATHOLOGY

## 2022-04-27 PROCEDURE — U0005 INFEC AGEN DETEC AMPLI PROBE: HCPCS | Mod: 90 | Performed by: PATHOLOGY

## 2022-04-27 PROCEDURE — 99000 SPECIMEN HANDLING OFFICE-LAB: CPT | Performed by: PATHOLOGY

## 2022-04-28 ENCOUNTER — HOSPITAL ENCOUNTER (OUTPATIENT)
Facility: AMBULATORY SURGERY CENTER | Age: 42
Discharge: HOME | End: 2022-04-28
Attending: STUDENT IN AN ORGANIZED HEALTH CARE EDUCATION/TRAINING PROGRAM
Payer: COMMERCIAL

## 2022-04-28 ENCOUNTER — ANESTHESIA EVENT (OUTPATIENT)
Dept: SURGERY | Facility: AMBULATORY SURGERY CENTER | Age: 42
End: 2022-04-28
Payer: COMMERCIAL

## 2022-04-28 ENCOUNTER — ANESTHESIA (OUTPATIENT)
Dept: SURGERY | Facility: AMBULATORY SURGERY CENTER | Age: 42
End: 2022-04-28
Payer: COMMERCIAL

## 2022-04-28 VITALS
SYSTOLIC BLOOD PRESSURE: 108 MMHG | RESPIRATION RATE: 18 BRPM | BODY MASS INDEX: 35.08 KG/M2 | WEIGHT: 259 LBS | HEART RATE: 67 BPM | OXYGEN SATURATION: 100 % | HEIGHT: 72 IN | TEMPERATURE: 97.7 F | DIASTOLIC BLOOD PRESSURE: 64 MMHG

## 2022-04-28 VITALS — HEART RATE: 68 BPM

## 2022-04-28 DIAGNOSIS — D62 ACUTE BLOOD LOSS ANEMIA: Primary | ICD-10-CM

## 2022-04-28 DIAGNOSIS — Z95.828 S/P TIPS (TRANSJUGULAR INTRAHEPATIC PORTOSYSTEMIC SHUNT): Primary | ICD-10-CM

## 2022-04-28 LAB
COLONOSCOPY: NORMAL
UPPER GI ENDOSCOPY: NORMAL

## 2022-04-28 PROCEDURE — 43239 EGD BIOPSY SINGLE/MULTIPLE: CPT

## 2022-04-28 PROCEDURE — 88305 TISSUE EXAM BY PATHOLOGIST: CPT | Mod: 26 | Performed by: PATHOLOGY

## 2022-04-28 PROCEDURE — 88305 TISSUE EXAM BY PATHOLOGIST: CPT | Mod: TC | Performed by: STUDENT IN AN ORGANIZED HEALTH CARE EDUCATION/TRAINING PROGRAM

## 2022-04-28 PROCEDURE — 45385 COLONOSCOPY W/LESION REMOVAL: CPT

## 2022-04-28 RX ORDER — LIDOCAINE 40 MG/G
CREAM TOPICAL
Status: DISCONTINUED | OUTPATIENT
Start: 2022-04-28 | End: 2022-04-29 | Stop reason: HOSPADM

## 2022-04-28 RX ORDER — PROPOFOL 10 MG/ML
INJECTION, EMULSION INTRAVENOUS PRN
Status: DISCONTINUED | OUTPATIENT
Start: 2022-04-28 | End: 2022-04-28

## 2022-04-28 RX ORDER — SIMETHICONE
LIQUID (ML) MISCELLANEOUS PRN
Status: DISCONTINUED | OUTPATIENT
Start: 2022-04-28 | End: 2022-04-28 | Stop reason: HOSPADM

## 2022-04-28 RX ORDER — PROPOFOL 10 MG/ML
INJECTION, EMULSION INTRAVENOUS CONTINUOUS PRN
Status: DISCONTINUED | OUTPATIENT
Start: 2022-04-28 | End: 2022-04-28

## 2022-04-28 RX ORDER — ONDANSETRON 2 MG/ML
4 INJECTION INTRAMUSCULAR; INTRAVENOUS
Status: DISCONTINUED | OUTPATIENT
Start: 2022-04-28 | End: 2022-04-29 | Stop reason: HOSPADM

## 2022-04-28 RX ORDER — LIDOCAINE HYDROCHLORIDE 20 MG/ML
INJECTION, SOLUTION INFILTRATION; PERINEURAL PRN
Status: DISCONTINUED | OUTPATIENT
Start: 2022-04-28 | End: 2022-04-28

## 2022-04-28 RX ORDER — SODIUM CHLORIDE, SODIUM LACTATE, POTASSIUM CHLORIDE, CALCIUM CHLORIDE 600; 310; 30; 20 MG/100ML; MG/100ML; MG/100ML; MG/100ML
INJECTION, SOLUTION INTRAVENOUS CONTINUOUS
Status: DISCONTINUED | OUTPATIENT
Start: 2022-04-28 | End: 2022-04-29 | Stop reason: HOSPADM

## 2022-04-28 RX ADMIN — PROPOFOL 200 MCG/KG/MIN: 10 INJECTION, EMULSION INTRAVENOUS at 14:20

## 2022-04-28 RX ADMIN — SODIUM CHLORIDE, SODIUM LACTATE, POTASSIUM CHLORIDE, CALCIUM CHLORIDE: 600; 310; 30; 20 INJECTION, SOLUTION INTRAVENOUS at 14:12

## 2022-04-28 RX ADMIN — PROPOFOL 100 MG: 10 INJECTION, EMULSION INTRAVENOUS at 14:22

## 2022-04-28 RX ADMIN — PROPOFOL 100 MG: 10 INJECTION, EMULSION INTRAVENOUS at 14:21

## 2022-04-28 RX ADMIN — LIDOCAINE HYDROCHLORIDE 60 MG: 20 INJECTION, SOLUTION INFILTRATION; PERINEURAL at 14:20

## 2022-04-28 NOTE — ANESTHESIA CARE TRANSFER NOTE
Patient: Florian Nowak    Procedure: Procedure(s):  COLONOSCOPY, WITH POLYPECTOMY AND CLIPS  ESOPHAGOGASTRODUODENOSCOPY, WITH BIOPSY       Diagnosis: Acute blood loss anemia [D62]  Diagnosis Additional Information: No value filed.    Anesthesia Type:   No value filed.     Note:    Oropharynx: oropharynx clear of all foreign objects  Level of Consciousness: awake  Oxygen Supplementation: room air    Independent Airway: airway patency satisfactory and stable  Dentition: dentition unchanged  Vital Signs Stable: post-procedure vital signs reviewed and stable  Report to RN Given: handoff report given  Patient transferred to: Phase II  Comments: VSS and WNL, comfortable, no PONV, report to Lianna BAUTISTA  Handoff Report: Identifed the Patient, Identified the Reponsible Provider, Reviewed the pertinent medical history, Discussed the surgical course, Reviewed Intra-OP anesthesia mangement and issues during anesthesia, Set expectations for post-procedure period and Allowed opportunity for questions and acknowledgement of understanding      Vitals:  Vitals Value Taken Time   BP     Temp     Pulse     Resp     SpO2         Electronically Signed By: SHAR Pace CRNA  April 28, 2022  3:27 PM

## 2022-04-28 NOTE — H&P
Florian Nowak  [id number]  male  42 year old      Reason for procedure/surgery: EGD/Colonoscopy for anemia    Patient Active Problem List   Diagnosis     Alcoholic cirrhosis of liver with ascites (H)     Atypical depression     Drug-induced erectile dysfunction     HTN (hypertension)     Mild intermittent asthma without complication     Obesity (BMI 30-39.9)     Acute blood loss anemia     Acute upper GI bleeding     DELLA (acute kidney injury) (H)     Chronic hyponatremia     Coagulopathy (H)     Hyperlipidemia     SBP (spontaneous bacterial peritonitis) (H)     Incarcerated umbilical hernia     Morbid obesity (H)       Past Surgical History:    Past Surgical History:   Procedure Laterality Date     HERNIORRHAPHY UMBILICAL N/A 11/11/2021    Procedure: HERNIORRHAPHY, UMBILICAL, OPEN;  Surgeon: Costa Malave MD;  Location:  OR       Past Medical History:   Past Medical History:   Diagnosis Date     Diabetes (H)      History of blood transfusion      Hypertension        Social History:   Social History     Tobacco Use     Smoking status: Former Smoker     Smokeless tobacco: Never Used     Tobacco comment: [place]   Substance Use Topics     Alcohol use: Not Currently     Comment: Quit ETOH 10/2020       Family History: No family history on file.    Allergies:   Allergies   Allergen Reactions     Ketorolac Tromethamine Other (See Comments)       Active Medications:   Current Outpatient Medications   Medication Sig Dispense Refill     ferrous sulfate (FEROSUL) 325 (65 Fe) MG tablet Take 325 mg by mouth daily (with breakfast)       fish oil-omega-3 fatty acids 1000 MG capsule Take 2 g by mouth daily       furosemide (LASIX) 40 MG tablet Take 2 tablets (80 mg) by mouth 2 times daily 360 tablet 1     vitamin D3 (CHOLECALCIFEROL) 250 mcg (56039 units) capsule Take 1 capsule by mouth daily       ciprofloxacin (CIPRO) 500 MG tablet Take 1 tablet (500 mg) by mouth daily (Patient not taking: Reported on 3/22/2022) 90  tablet 3     magnesium 250 MG tablet Take 1 tablet by mouth daily (Patient not taking: Reported on 3/22/2022)       ondansetron (ZOFRAN) 4 MG tablet Take 1 tablet (4 mg) by mouth every 8 hours as needed for nausea (Patient not taking: Reported on 3/22/2022) 20 tablet 0       Systemic Review:   CONSTITUTIONAL: NEGATIVE for fever, chills, change in weight  ENT/MOUTH: NEGATIVE for ear, mouth and throat problems  RESP: NEGATIVE for significant cough or SOB  CV: NEGATIVE for chest pain, palpitations or peripheral edema    Physical Examination:   Vital Signs: /71   Pulse 83   Temp 97.4  F (36.3  C) (Temporal)   Resp 21   Ht 1.829 m (6')   Wt 117.5 kg (259 lb)   SpO2 99%   BMI 35.13 kg/m    GENERAL: healthy, alert and no distress  NECK: no adenopathy, no asymmetry, masses, or scars  RESP: lungs clear to auscultation - no rales, rhonchi or wheezes  CV: regular rate and rhythm, normal S1 S2, no S3 or S4, no murmur, click or rub, no peripheral edema and peripheral pulses strong  ABDOMEN: soft, nontender, no hepatosplenomegaly, no masses and bowel sounds normal  MS: no gross musculoskeletal defects noted, no edema    Plan: Appropriate to proceed as scheduled.      Cadence Murray MD  4/28/2022    PCP:  Perez Salcido     SHAUN COATES  71y, Female  Allergy: codeine (Other (Moderate))  Depakote (Unknown)  Dilaudid (Short breath; Rash)  IV Contrast (Anaphylaxis)  losartan (Angioedema)  Risperdal (Other)  verapamil (Short breath; Angioedema)      CHIEF COMPLAINT:   chest pain weakness (24 Dec 2020 08:46)      LOS  1d    HPI  HPI:  72yo female pmhx of COPD on 5L home oxygen, paroxysmal afib on pradaxa, carotid stenosis (L 80%, R 40%) s/p L carotid stenting recently , HFpEF (EF 70% Dec 2020), HTN, HLD, bipolar disorder ,left parotid mass ,  recently admitted between - for COVID PNA s/p steroids/RDV presented to the ED with chest pain , sharp in character , 8/10 intensity, non radiating, post tussive. Patient mentioned she has chronic non productive cough but yesterday afternoon she had pretty bad cough giving her chest pain. She also mentions she had 1 episode of subjective fever yesterday. Denies nausea/vomiting/chills/diarrhea/dysuria/urgency.       ED Course: initially vitally stable but she became hypotensive to 70/50 s/p 3L bolus >>remained hypotensive was started on low dose levo. Labs: BUN/Cr: 39/1.6, lactate :3, UA + Nitrite LE+ admitted for urosepsis and JOYCE   (23 Dec 2020 01:50)      INFECTIOUS DISEASE HISTORY:  UCX NG, UA without significant pyuria   BCX coNS 1/4 bottles  CXR bilateral opacities     PMH  PAST MEDICAL & SURGICAL HISTORY:  Falls    Congestive heart failure    Transient ischemic attack    FLAVIO on CPAP    Bipolar 1 disorder    Allergic reaction    PVD (peripheral vascular disease)    Other emphysema    Other cardiomyopathy    TIA (transient ischemic attack)    COPD (chronic obstructive pulmonary disease)    Depression    Hypertension    History of cholecystectomy        FAMILY HISTORY  No pertinent family history in first degree relatives        SOCIAL HISTORY  Social History:  Lives with COVID+ daughter (2020 16:06)        VITALS:  T(F): 98.8, Max: 103 (20 @ 18:00)  HR: 99  BP: 102/58  RR: 20Vital Signs Last 24 Hrs  T(C): 37.1 (24 Dec 2020 12:08), Max: 39.4 (23 Dec 2020 18:00)  T(F): 98.8 (24 Dec 2020 12:08), Max: 103 (23 Dec 2020 18:00)  HR: 99 (24 Dec 2020 12:08) (99 - 120)  BP: 102/58 (24 Dec 2020 12:08) (90/60 - 138/67)  BP(mean): 72 (24 Dec 2020 12:08) (70 - 89)  RR: 20 (24 Dec 2020 12:08) (20 - 21)  SpO2: 100% (24 Dec 2020 12:08) (90% - 100%)      TESTS & MEASUREMENTS:                        10.3   5.63  )-----------( 157      ( 23 Dec 2020 07:33 )             32.7         139  |  106  |  31<H>  ----------------------------<  97  3.9   |  24  |  1.0    Ca    8.4<L>      23 Dec 2020 07:33  Phos  3.4       Mg     1.8         TPro  5.3<L>  /  Alb  3.1<L>  /  TBili  0.5  /  DBili  x   /  AST  43<H>  /  ALT  26  /  AlkPhos  81  1223      LIVER FUNCTIONS - ( 23 Dec 2020 07:33 )  Alb: 3.1 g/dL / Pro: 5.3 g/dL / ALK PHOS: 81 U/L / ALT: 26 U/L / AST: 43 U/L / GGT: x           Urinalysis Basic - ( 23 Dec 2020 00:07 )    Color: Light Yellow / Appearance: Clear / S.012 / pH: x  Gluc: x / Ketone: Negative  / Bili: Negative / Urobili: <2 mg/dL   Blood: x / Protein: Trace / Nitrite: Positive   Leuk Esterase: Moderate / RBC: 3 /HPF / WBC 6 /HPF   Sq Epi: x / Non Sq Epi: 2 /HPF / Bacteria: Negative        Culture - Urine (collected 20 @ 00:07)  Source: .Urine Clean Catch (Midstream)  Final Report (20 @ 02:06):    <10,000 CFU/mL Normal Urogenital Beronica    Culture - Blood (collected 20 @ 21:53)  Source: .Blood Blood-Peripheral  Gram Stain (20 @ 03:50):    Growth in aerobic bottle: Gram Positive Cocci in Clusters  Preliminary Report (20 @ 03:51):    Growth in aerobic bottle: Gram Positive Cocci in Clusters    ***Blood Panel PCR results on this specimen are available    approximately 3 hours after the Gram stain result.***    Gram stain, PCR, and/or culture results may not always    correspond due to difference in methodologies.    ************************************************************    This PCR assay was performed using [employer].    The following targets are tested for: Enterococcus,    vancomycin resistant enterococci, Listeria monocytogenes,    coagulase negative staphylococci, S. aureus,    methicillin resistant S. aureus, Streptococcus agalactiae    (Group B), S. pneumoniae, S. pyogenes (Group A),    Acinetobacter baumannii, Enterobacter cloacae, E. coli,    Klebsiella oxytoca, K. pneumoniae, Proteus sp.,    Serratia marcescens, Haemophilus influenzae,    Neisseria meningitidis, Pseudomonas aeruginosa, Candida    albicans, C. glabrata, C krusei, C parapsilosis,    C. tropicalis and the KPC resistance gene.    "Due to technical problems, Proteus sp. and Pseudomonas    aeruginosa will Not be reported as part of the BCID panel    until further notice".  Organism: Blood Culture PCR (20 @ 06:23)  Organism: Blood Culture PCR (20 @ 06:23)      -  Coagulase negative Staphylococcus: Detec      Method Type: PCR    Culture - Blood (collected 20 @ 21:53)  Source: .Blood Blood-Peripheral  Preliminary Report (20 @ 07:01):    No growth to date.    Culture - Blood (collected 20 @ 11:28)  Source: .Blood Blood-Peripheral  Final Report (20 @ 23:01):    No Growth Final    Culture - Urine (collected 10-16-20 @ 12:00)  Source: .Urine Clean Catch (Midstream)  Final Report (10-19-20 @ 18:15):    >100,000 CFU/ml Escherichia coli  Organism: Escherichia coli (10-19-20 @ 18:15)  Organism: Escherichia coli (10-19-20 @ 18:15)      -  Amikacin: S <=16      -  Amoxicillin/Clavulanic Acid: S <=8/4      -  Ampicillin: S <=8 These ampicillin results predict results for amoxicillin      -  Ampicillin/Sulbactam: S <=4/2 Enterobacter, Citrobacter, and Serratia may develop resistance during prolonged therapy (3-4 days)      -  Aztreonam: S <=4      -  Cefazolin: S <=2 (MIC_CL_COM_ENTERIC_CEFAZU) For uncomplicated UTI with K. pneumoniae, E. coli, or P. mirablis: MARCELLO <=16 is sensitive and MARCELLO >=32 is resistant. This also predicts results for oral agents cefaclor, cefdinir, cefpodoxime, cefprozil, cefuroxime axetil, cephalexin and locarbef for uncomplicated UTI. Note that some isolates may be susceptible to these agents while testing resistant to cefazolin.      -  Cefepime: S <=2      -  Cefoxitin: S <=8      -  Ceftriaxone: S <=1 Enterobacter, Citrobacter, and Serratia may develop resistance during prolonged therapy      -  Ciprofloxacin: R >2      -  Ertapenem: S <=0.5      -  Gentamicin: S <=2      -  Imipenem: S <=1      -  Levofloxacin: R >4      -  Meropenem: S <=1      -  Nitrofurantoin: S <=32 Should not be used to treat pyelonephritis      -  Piperacillin/Tazobactam: S <=8      -  Tigecycline: S <=2      -  Tobramycin: S 4      -  Trimethoprim/Sulfamethoxazole: S <=0.5/9.5      Method Type: MARCELLO    Culture - Urine (collected 20 @ 16:30)  Source: .Urine Clean Catch (Midstream)  Final Report (20 @ 20:01):    >100,000 CFU/ml Proteus mirabilis    10,000 - 49,000 CFU/mL Escherichia coli  Organism: Proteus mirabilis  Escherichia coli (20 @ 20:01)  Organism: Escherichia coli (20 @ 20:01)      -  Amikacin: S <=16      -  Ampicillin: S <=8 These ampicillin results predict results for amoxicillin      -  Ampicillin/Sulbactam: S <=8/4 Enterobacter, Citrobacter, and Serratia may develop resistance during prolonged therapy (3-4 days)      -  Aztreonam: S <=4      -  Cefazolin: S <=8 (MIC_CL_COM_ENTERIC_CEFAZU) For uncomplicated UTI with K. pneumoniae, E. coli, or P. mirablis: MARCELLO <=16 is sensitive and MARCELLO >=32 is resistant. This also predicts results for oral agents cefaclor, cefdinir, cefpodoxime, cefprozil, cefuroxime axetil, cephalexin and locarbef for uncomplicated UTI. Note that some isolates may be susceptible to these agents while testing resistant to cefazolin.      -  Cefepime: S <=4      -  Cefoxitin: S <=8      -  Ceftriaxone: S <=1 Enterobacter, Citrobacter, and Serratia may develop resistance during prolonged therapy      -  Ciprofloxacin: R >2      -  Gentamicin: S <=4      -  Imipenem: S <=1      -  Levofloxacin: R >4      -  Meropenem: S <=1      -  Nitrofurantoin: S <=32 Should not be used to treat pyelonephritis      -  Piperacillin/Tazobactam: S <=16      -  Tigecycline: S <=2      -  Tobramycin: S <=4      -  Trimethoprim/Sulfamethoxazole: S <=2/38      Method Type: MARCELLO  Organism: Proteus mirabilis (20 @ 20:01)      -  Amikacin: S <=16      -  Ampicillin: S <=8 These ampicillin results predict results for amoxicillin      -  Ampicillin/Sulbactam: S <=8/4 Enterobacter, Citrobacter, and Serratia may develop resistance during prolonged therapy (3-4 days)      -  Aztreonam: S <=4      -  Cefazolin: S <=8 (MIC_CL_COM_ENTERIC_CEFAZU) For uncomplicated UTI with K. pneumoniae, E. coli, or P. mirablis: MARCELLO <=16 is sensitive and MARCELLO >=32 is resistant. This also predicts results for oral agents cefaclor, cefdinir, cefpodoxime, cefprozil, cefuroxime axetil, cephalexin and locarbef for uncomplicated UTI. Note that some isolates may be susceptible to these agents while testing resistant to cefazolin.      -  Cefepime: S <=4      -  Cefoxitin: S <=8      -  Ceftriaxone: S <=1 Enterobacter, Citrobacter, and Serratia may develop resistance during prolonged therapy      -  Ciprofloxacin: S <=1      -  Gentamicin: S <=4      -  Levofloxacin: S <=2      -  Meropenem: S <=1      -  Nitrofurantoin: R >64 Should not be used to treat pyelonephritis      -  Piperacillin/Tazobactam: S <=16      -  Tobramycin: S <=4      -  Trimethoprim/Sulfamethoxazole: S <=2/38      Method Type: MARCELLO        Lactate, Blood: 1.4 mmol/L (20 @ 07:33)  Blood Gas Venous - Lactate: ?2.7 mmoL/L (20 @ 02:23)  Blood Gas Venous - Lactate: 3.0 mmoL/L (20 @ 22:07)      INFECTIOUS DISEASES TESTING  COVID-19 PCR: Detected (20 @ 04:50)  Procalcitonin, Serum: 0.84 ng/mL (20 @ 21:56)  Procalcitonin, Serum: 0.10 ng/mL (20 @ 08:14)  COVID-19 PCR: Detected (20 @ 12:30)  Procalcitonin, Serum: 0.09 ng/mL (20 @ 10:05)  Procalcitonin, Serum: 0.09 ng/mL (20 @ 21:30)  COVID-19 PCR: NotDetec (20 @ 13:30)  Procalcitonin, Serum: 0.10 ng/mL (20 @ 13:15)  Rapid RVP Result: NotDetec (10-14-20 @ 18:06)      INFLAMMATORY MARKERS  C-Reactive Protein, Serum: 9.57 mg/dL (20 @ 07:33)      RADIOLOGY & ADDITIONAL TESTS:  I have personally reviewed the last Chest xray  CXR      CT      CARDIOLOGY TESTING  12 Lead ECG:   Ventricular Rate 99 BPM    Atrial Rate 99 BPM    P-R Interval 140 ms    QRS Duration 88 ms    Q-T Interval 352 ms    QTC Calculation(Bazett) 451 ms    P Axis 66 degrees    R Axis 58 degrees    T Axis 35 degrees    Diagnosis Line Normal sinus rhythm  Biatrial enlargement  T wave abnormality, consider lateral ischemia  Abnormal ECG    Confirmed by Scott Cowan ([id number]) on 2020 4:12:24 PM (20 @ 10:57)  12 Lead ECG:   Ventricular Rate 127 BPM    QRS Duration 86 ms    Q-T Interval 328 ms    QTC Calculation(Bazett) 476 ms    R Axis 58 degrees    T Axis -4 degrees    Diagnosis Line Atrial fibrillation with rapid ventricular response  Marked ST abnormality, possibleinferior subendocardial injury  Abnormal ECG    Confirmed by Scott Cowan ([id number]) on 2020 10:56:52 PM (20 @ 21:07)      MEDICATIONS  aspirin  chewable 81 Oral daily  atorvastatin 20 Oral at bedtime  budesonide 160 MICROgram(s)/formoterol 4.5 MICROgram(s) Inhaler 2 Inhalation two times a day  cefepime   IVPB 1000 IV Intermittent every 12 hours  clopidogrel Tablet 75 Oral daily  dabigatran 150 Oral every 12 hours  gabapentin 300 Oral three times a day  lamoTRIgine 100 Oral daily  montelukast 10 Oral daily  multivitamin 1 Oral daily  pantoprazole    Tablet 40 Oral before breakfast  QUEtiapine 200 Oral at bedtime  sodium chloride 0.9%. 1000 IV Continuous <Continuous>  sodium chloride 0.9%. 1000 IV Continuous <Continuous>  tiotropium 18 MICROgram(s) Capsule 1 Inhalation daily      Weight  Weight (kg): 82.599 (20 @ 04:25)    ANTIBIOTICS:  cefepime   IVPB 1000 milliGRAM(s) IV Intermittent every 12 hours      ALLERGIES:  codeine (Other (Moderate))  Depakote (Unknown)  Dilaudid (Short breath; Rash)  IV Contrast (Anaphylaxis)  losartan (Angioedema)  Risperdal (Other)  verapamil (Short breath; Angioedema)

## 2022-04-29 LAB
PATH REPORT.COMMENTS IMP SPEC: NORMAL
PATH REPORT.COMMENTS IMP SPEC: NORMAL
PATH REPORT.FINAL DX SPEC: NORMAL
PATH REPORT.GROSS SPEC: NORMAL
PATH REPORT.MICROSCOPIC SPEC OTHER STN: NORMAL
PATH REPORT.RELEVANT HX SPEC: NORMAL
PHOTO IMAGE: NORMAL

## 2022-05-04 NOTE — ANESTHESIA PREPROCEDURE EVALUATION
Anesthesia Pre-Procedure Evaluation    Patient: Florian Nowak   MRN: [id number] : 1980        Procedure : Procedure(s):  COLONOSCOPY, WITH POLYPECTOMY AND CLIPS  ESOPHAGOGASTRODUODENOSCOPY, WITH BIOPSY          Past Medical History:   Diagnosis Date     Diabetes (H)      History of blood transfusion      Hypertension       Past Surgical History:   Procedure Laterality Date     COLONOSCOPY N/A 2022    Procedure: COLONOSCOPY, WITH POLYPECTOMY AND CLIPS;  Surgeon: Cadence Murray MD;  Location: [facility] OR     ESOPHAGOSCOPY, GASTROSCOPY, DUODENOSCOPY (EGD), COMBINED N/A 2022    Procedure: ESOPHAGOGASTRODUODENOSCOPY, WITH BIOPSY;  Surgeon: Cadence Murray MD;  Location: [facility] OR     HERNIORRHAPHY UMBILICAL N/A 2021    Procedure: HERNIORRHAPHY, UMBILICAL, OPEN;  Surgeon: Costa Malave MD;  Location: [facility] OR      Allergies   Allergen Reactions     Ketorolac Tromethamine Other (See Comments)      Social History     Tobacco Use     Smoking status: Former Smoker     Smokeless tobacco: Never Used     Tobacco comment: [email or web address]   Substance Use Topics     Alcohol use: Not Currently     Comment: Quit ETOH 10/2020      Wt Readings from Last 1 Encounters:   22 117.5 kg (259 lb)        Anesthesia Evaluation   Pt has had prior anesthetic.     No history of anesthetic complications       ROS/MED HX  ENT/Pulmonary:     (+) Intermittent, asthma     Neurologic:  - neg neurologic ROS     Cardiovascular:     (+) Dyslipidemia hypertension-----    METS/Exercise Tolerance: >4 METS    Hematologic:  - neg hematologic  ROS     Musculoskeletal:  - neg musculoskeletal ROS     GI/Hepatic:     (+) liver disease (S/p transplant ),     Renal/Genitourinary:  - neg Renal ROS     Endo:     (+) Obesity,     Psychiatric/Substance Use:     (+) psychiatric history anxiety and depression alcohol abuse     Infectious Disease:  - neg infectious disease ROS     Malignancy:  - neg malignancy ROS     Other:  - neg other ROS           Physical Exam    Airway  airway exam normal           Respiratory Devices and Support         Dental  no notable dental history         Cardiovascular   cardiovascular exam normal          Pulmonary   pulmonary exam normal                OUTSIDE LABS:  CBC:   Lab Results   Component Value Date    WBC 4.1 03/18/2022    WBC 4.3 02/04/2022    HGB 9.3 (L) 03/18/2022    HGB 9.4 (L) 02/04/2022    HCT 28.0 (L) 03/18/2022    HCT 29.6 (L) 02/04/2022     (L) 03/18/2022     (L) 02/04/2022     BMP:   Lab Results   Component Value Date     03/18/2022     02/04/2022    POTASSIUM 3.7 03/18/2022    POTASSIUM 3.3 (L) 02/04/2022    CHLORIDE 104 03/18/2022    CHLORIDE 108 02/04/2022    CO2 26 03/18/2022    CO2 27 02/04/2022    BUN 17 03/18/2022    BUN 14 02/04/2022    CR 0.92 03/18/2022    CR 0.85 02/04/2022    GLC 89 03/18/2022    GLC 95 02/04/2022     COAGS:   Lab Results   Component Value Date    PTT 36 11/11/2021    INR 1.33 (H) 03/18/2022     POC: No results found for: BGM, HCG, HCGS  HEPATIC:   Lab Results   Component Value Date    ALBUMIN 3.1 (L) 03/18/2022    PROTTOTAL 6.7 03/18/2022    ALT 23 03/18/2022    AST 47 (H) 03/18/2022    ALKPHOS 129 (H) 03/18/2022    BILITOTAL 1.0 03/18/2022     OTHER:   Lab Results   Component Value Date    LACT 2.4 (H) 11/11/2021    SANJANA 8.7 03/18/2022    LIPASE 237 11/11/2021       Anesthesia Plan    ASA Status:  3   NPO Status:  NPO Appropriate    Anesthesia Type: MAC.     - Reason for MAC: straight local not clinically adequate   Induction: N/a.   Maintenance: TIVA.        Consents    Anesthesia Plan(s) and associated risks, benefits, and realistic alternatives discussed. Questions answered and patient/representative(s) expressed understanding.    - Discussed:     - Discussed with:  Patient         Postoperative Care       PONV prophylaxis: Ondansetron (or other 5HT-3)     Comments:                Garret Ladd MD

## 2022-05-04 NOTE — ANESTHESIA POSTPROCEDURE EVALUATION
Patient: Florian Nowak    Procedure: Procedure(s):  COLONOSCOPY, WITH POLYPECTOMY AND CLIPS  ESOPHAGOGASTRODUODENOSCOPY, WITH BIOPSY       Anesthesia Type:  MAC    Note:  Disposition: Outpatient   Postop Pain Control: Uneventful            Sign Out: Well controlled pain   PONV: No   Neuro/Psych: Uneventful            Sign Out: Acceptable/Baseline neuro status   Airway/Respiratory: Uneventful            Sign Out: Acceptable/Baseline resp. status   CV/Hemodynamics: Uneventful            Sign Out: Acceptable CV status; No obvious hypovolemia; No obvious fluid overload   Other NRE: NONE   DID A NON-ROUTINE EVENT OCCUR? No           Last vitals:  Vitals Value Taken Time   /64 04/28/22 1550   Temp 36.5  C (97.7  F) 04/28/22 1526   Pulse 67 04/28/22 1550   Resp 18 04/28/22 1550   SpO2 100 % 04/28/22 1550       Electronically Signed By: Garret Ladd MD  May 4, 2022  3:43 PM

## 2022-05-05 ENCOUNTER — PATIENT OUTREACH (OUTPATIENT)
Dept: GASTROENTEROLOGY | Facility: CLINIC | Age: 42
End: 2022-05-05
Payer: COMMERCIAL

## 2022-05-05 NOTE — PROGRESS NOTES
Pt had upper endoscopy and colonoscopy on 4/28. Grade 1 esophageal varices noted and US abdomen limited with TIPSS doppler ordered per Dr. Murray. Pt has ultrasound scheduled on 5/11.     Pt reported feeling well since endoscopy and colonoscopy. Denied hematemesis, hematochezia, and melena. Pt reported intermittently taking oral iron and encouraged pt to iron supplement daily if able to tolerate. Pt stated that he was taking iron daily prior to EGD and colonoscopy and needs to get back into the habit of taking it. Discussed that Dr. Murray wanted pt to get repeat CBC after taking iron supplement for 6 weeks. Will plan to check labs in 1 month.     Pt continues to have abdominal pain and a bulge around umbilicus. Pt is working on losing weight with diet and exercise to see if the improves abdominal pain. Encouraged pt to wear abdominal binder. Advised pt to reach out to surgery again if pt unable to reduce bulge and/or experiences increased abdominal pain.     Will check in with pt in 2-3 weeks. Pt verbalized understanding and is agreeable to plan of care.

## 2022-05-11 ENCOUNTER — HOSPITAL ENCOUNTER (OUTPATIENT)
Dept: ULTRASOUND IMAGING | Facility: HOSPITAL | Age: 42
Discharge: HOME | End: 2022-05-11
Attending: STUDENT IN AN ORGANIZED HEALTH CARE EDUCATION/TRAINING PROGRAM | Admitting: STUDENT IN AN ORGANIZED HEALTH CARE EDUCATION/TRAINING PROGRAM
Payer: COMMERCIAL

## 2022-05-11 DIAGNOSIS — Z95.828 S/P TIPS (TRANSJUGULAR INTRAHEPATIC PORTOSYSTEMIC SHUNT): ICD-10-CM

## 2022-05-11 PROCEDURE — 76705 ECHO EXAM OF ABDOMEN: CPT

## 2022-05-13 ENCOUNTER — TRANSFERRED RECORDS (OUTPATIENT)
Dept: HEALTH INFORMATION MANAGEMENT | Facility: CLINIC | Age: 42
End: 2022-05-13

## 2022-05-17 ENCOUNTER — VIRTUAL VISIT (OUTPATIENT)
Dept: SURGERY | Facility: CLINIC | Age: 42
End: 2022-05-17
Payer: COMMERCIAL

## 2022-05-17 VITALS — WEIGHT: 240 LBS | BODY MASS INDEX: 32.51 KG/M2 | HEIGHT: 72 IN

## 2022-05-17 DIAGNOSIS — K42.9 UMBILICAL HERNIA WITHOUT OBSTRUCTION AND WITHOUT GANGRENE: Primary | ICD-10-CM

## 2022-05-17 PROCEDURE — 99213 OFFICE O/P EST LOW 20 MIN: CPT | Mod: 95 | Performed by: SURGERY

## 2022-05-17 ASSESSMENT — PAIN SCALES - GENERAL: PAINLEVEL: NO PAIN (0)

## 2022-05-17 NOTE — PATIENT INSTRUCTIONS
You met with Dr. Price Dickson.      Today's visit instructions:    Dr. Dickson recommends that you follow-up with your liver doctor as soon as possible. You need to be free of ascites for 6 months prior to having hernia repair surgery. Please call us after you have seen your liver doctor to set up another appointment with Dr. Dickson and a repeat CT scan.       If you have questions please contact Lianna RN or [place] RN during regular clinic hours, Monday through Friday 7:30 AM - 4:00 PM, or you can contact us via Oscar at anytime.       If you have urgent needs after-hours, weekends, or holidays please call the hospital at [phone] and ask to speak with our on-call General Surgery Team.    Appointment [phone]  Nurse Advice ([place]ianna or [place]): [phone]   Surgery Scheduler ([place]): [phone]  Fax: [phone]

## 2022-05-17 NOTE — NURSING NOTE
Chief Complaint   Patient presents with     RECHECK     Discuss hernia repair       Vitals:    05/17/22 0738   Weight: 108.9 kg (240 lb)   Height: 1.829 m (6')       Body mass index is 32.55 kg/m .                          Elif Singh, EMT

## 2022-05-17 NOTE — PROGRESS NOTES
"Florian is a 42 year old who is being evaluated via a billable video visit.      Patient seen 3/15/22 in follow up from     11/11/2021  Herniorrhaphy umbilical (N/A) Procedure: HERNIORRHAPHY, UMBILICAL, OPEN;  Surgeon: Costa Malave MD;  Location: [facility] OR    Repeat CT done 3/24 did not show recurrence.    Patient continues with pain. I had recommended weight loss to goal 225 lbs to see if that would help.  Has lost some weight though not to goal.  Today c/o continued pain.    Last ultrasound done 5/11/22     IMPRESSION:  1.  TIPS is patent.  2.  Ultrasound findings suggestive of underlying cirrhosis.  3.  Moderate ascites and splenomegaly suggestive of portal hypertension.    Impression: continued pain s/p umb hernia repair in patient with cirrhosis and ascites.  Rec: continue weight loss and work with GI for management liver disease.  Can repeat CT after that eval to see if recurrence actual developing.  Would not offer surgery if recurrence until ascites free and weight to goal.  Discussed at length with patient.  Understands he can seek second opinion or see his primary surgeon.  \"Total time = 20 minutes, spent on the date of encounter doing chart review, history and physical, documentation, patient education, and any further activity as noted above.           How would you like to obtain your AVS? MyChart  If the video visit is dropped, the invitation should be resent by: [phone]  Will anyone else be joining your video visit? No    During this virtual visit the patient is located in [place], patient verifies this as the location during the entirety of this visit.   Video Start Time: 8;40  Video-Visit Details    Type of service:  Video Visit    Video End Time:8;50    Originating Location (pt. Location): Home    Distant Location (provider location):  [facility] SURGERY [facility]     Platform used for Video Visit: [employer]    "

## 2022-05-17 NOTE — LETTER
Date:May 25, 2022      Provider requested that no letter be sent. Do not send.       [facility]

## 2022-05-20 ENCOUNTER — VIRTUAL VISIT (OUTPATIENT)
Dept: GASTROENTEROLOGY | Facility: CLINIC | Age: 42
End: 2022-05-20
Attending: STUDENT IN AN ORGANIZED HEALTH CARE EDUCATION/TRAINING PROGRAM
Payer: COMMERCIAL

## 2022-05-20 DIAGNOSIS — K70.31 ALCOHOLIC CIRRHOSIS OF LIVER WITH ASCITES (H): ICD-10-CM

## 2022-05-20 DIAGNOSIS — Z95.828 S/P TIPS (TRANSJUGULAR INTRAHEPATIC PORTOSYSTEMIC SHUNT): Primary | ICD-10-CM

## 2022-05-20 DIAGNOSIS — D62 ACUTE BLOOD LOSS ANEMIA: ICD-10-CM

## 2022-05-20 PROCEDURE — 99214 OFFICE O/P EST MOD 30 MIN: CPT | Mod: 95 | Performed by: STUDENT IN AN ORGANIZED HEALTH CARE EDUCATION/TRAINING PROGRAM

## 2022-05-20 RX ORDER — EPLERENONE 25 MG/1
25 TABLET, FILM COATED ORAL DAILY
Qty: 30 TABLET | Refills: 1 | Status: SHIPPED | OUTPATIENT
Start: 2022-05-20 | End: 2022-07-22

## 2022-05-20 ASSESSMENT — PAIN SCALES - GENERAL: PAINLEVEL: NO PAIN (0)

## 2022-05-20 NOTE — PROGRESS NOTES
Florian is a 42 year old who is being evaluated via a billable video visit.      How would you like to obtain your AVS? [employer]  If the video visit is dropped, the invitation should be resent by: Text to cell phone: [phone]  Will anyone else be joining your video visit? No      Video Start Time: 9:35  Video-Visit Details    Type of service:  Video Visit    Video End Time:9:50    Originating Location (pt. Location): Home    Distant Location (provider location):  [facility] [place]     Platform used for Video Visit: [employer]

## 2022-05-20 NOTE — PROGRESS NOTES
[facility] Return Patient Visit    Date of Visit: 5/20/2022    Reason for referral: Alcohol related liver disease    Subjective: Mr. Nowak is a 41 year old man with a history of AUD, alcohol related cirrhosis c/b refractory ascites, SBP, variceal bleeding, s/p TIPS 9/22/2021, who presents for routine follow up     Interval Events:  - Still has some perihepatic ascites on imaging. Taking Lasix 80 mg twice a day.  Had gynecomastia with spironolactone. Having abdominal discomfort around his hernia site, seeing surgery here who does not feel he clinically has a hernia. He note that he will have a protrusion around his umbilical hernia repair but it is reducible. This is very painful  - Taking iron pills consistently for about a month  - Otherwise doing. Diagnosed with macular degeneration    ROS: 14 point ROS negative except for positives noted in HPI.    PMHx:  - AUD  - Alcohol related cirrhosis c/b refractory ascites/SBP, variceal bleeding  - Asthma  - HTN    PSHx:  - Back surgery microdisectomy    FamHx:  No family history of liver disease, liver cancer    SocHx:  Social History     Socioeconomic History     Marital status:      Spouse name: Not on file     Number of children: Not on file     Years of education: Not on file     Highest education level: Not on file   Occupational History     Not on file   Tobacco Use     Smoking status: Former Smoker     Smokeless tobacco: Never Used     Tobacco comment: College   Substance and Sexual Activity     Alcohol use: Not Currently     Comment: Quit ETOH 10/2020     Drug use: Not Currently     Sexual activity: Not on file   Other Topics Concern     Parent/sibling w/ CABG, MI or angioplasty before 65F 55M? 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   No NSAIDs    Allergies:  Allergies   Allergen Reactions     Ketorolac Tromethamine Other (See Comments)       Objective:  There were no vitals taken for this visit.  Constitutional: pleasant man in NAD  Eyes: non icteric  Respiratory: Normal respiratory excursion   Abd: Non distended, incision c/d/i  Skin: No jaundice  Psychiatric: normal mood and orientation    Labs:  Last Comprehensive Metabolic Panel:  Sodium   Date Value Ref Range Status   03/18/2022 137 136 - 145 mmol/L Final     Potassium   Date Value Ref Range Status   03/18/2022 3.7 3.5 - 5.0 mmol/L Final     Chloride   Date Value Ref Range Status   03/18/2022 104 98 - 107 mmol/L Final     Carbon Dioxide (CO2)   Date Value Ref Range Status   03/18/2022 26 22 - 31 mmol/L Final     Anion Gap   Date Value Ref Range Status   03/18/2022 7 5 - 18 mmol/L Final     Glucose   Date Value Ref Range Status   03/18/2022 89 70 - 125 mg/dL Final     Urea Nitrogen   Date Value Ref Range Status   03/18/2022 17 8 - 22 mg/dL Final     Creatinine   Date Value Ref Range Status   03/18/2022 0.92 0.70 - 1.30 mg/dL Final     GFR Estimate   Date Value Ref Range Status   03/18/2022 >90 >60 mL/min/1.73m2 Final     Comment:     Effective December 21, 2021 eGFRcr in adults is calculated using the 2021 CKD-EPI creatinine equation which  includes age and gender (Brijesh naranjo al., NEJ, DOI: 10.1056/UTZEmc2190101)     Calcium   Date Value Ref Range Status   03/18/2022 8.7 8.5 - 10.5 mg/dL Final     Bilirubin Total   Date Value Ref Range Status   03/18/2022 1.0 0.0 - 1.0 mg/dL Final     Alkaline Phosphatase   Date Value Ref Range Status   03/18/2022 129 (H) 45 - 120 U/L Final     ALT   Date Value Ref Range Status   03/18/2022 23 0 - 45 U/L Final     AST   Date Value Ref Range Status   03/18/2022 47 (H) 0 - 40 U/L Final       Lab Results   Component Value Date    WBC 8.5 08/18/2021     Lab Results   Component Value Date    RBC 3.26 08/18/2021     Lab Results   Component Value Date    HGB 9.7 08/18/2021     Lab Results   Component Value Date    HCT 29.0 08/18/2021     Lab Results   Component Value Date    MCV 89 08/18/2021     Lab Results   Component Value Date    MCH 29.8 08/18/2021     Lab Results   Component Value Date    MCHC 33.4 08/18/2021     Lab Results   Component Value Date    RDW 14.2 08/18/2021     Lab Results   Component Value Date     08/18/2021       INR   Date Value Ref Range Status   03/18/2022 1.33 (H) 0.85 - 1.15 Final     Last Comprehensive Metabolic Panel:  Sodium   Date Value Ref Range Status   03/18/2022 137 136 - 145 mmol/L Final     Potassium   Date Value Ref Range Status   03/18/2022 3.7 3.5 - 5.0 mmol/L Final     Chloride   Date Value Ref Range Status   03/18/2022 104 98 - 107 mmol/L Final     Carbon Dioxide (CO2)   Date Value Ref Range Status   03/18/2022 26 22 - 31 mmol/L Final     Anion Gap   Date Value Ref Range Status   03/18/2022 7 5 - 18 mmol/L Final     Glucose   Date Value Ref Range Status   03/18/2022 89 70 - 125 mg/dL Final     Urea Nitrogen   Date Value Ref Range Status   03/18/2022 17 8 - 22 mg/dL Final     Creatinine   Date Value Ref Range Status   03/18/2022 0.92 0.70 - 1.30 mg/dL Final     GFR Estimate   Date Value Ref Range Status   03/18/2022 >90 >60 mL/min/1.73m2 Final     Comment:     Effective December  21, 2021 eGFRcr in adults is calculated using the 2021 CKD-EPI creatinine equation which includes age and gender (Brijesh et al., NEJ, DOI: 10.1056/RZAIba4163963)     Calcium   Date Value Ref Range Status   03/18/2022 8.7 8.5 - 10.5 mg/dL Final     Bilirubin Total   Date Value Ref Range Status   03/18/2022 1.0 0.0 - 1.0 mg/dL Final     Alkaline Phosphatase   Date Value Ref Range Status   03/18/2022 129 (H) 45 - 120 U/L Final     ALT   Date Value Ref Range Status   03/18/2022 23 0 - 45 U/L Final     AST   Date Value Ref Range Status   03/18/2022 47 (H) 0 - 40 U/L Final             Lab Results   Component Value Date    WBC 8.3 09/10/2021     Lab Results   Component Value Date    RBC 2.64 09/10/2021     Lab Results   Component Value Date    HGB 7.9 09/10/2021     Lab Results   Component Value Date    HCT 24.3 09/10/2021     No components found for: MCT  Lab Results   Component Value Date    MCV 92 09/10/2021     Lab Results   Component Value Date    MCH 29.9 09/10/2021     Lab Results   Component Value Date    MCHC 32.5 09/10/2021     Lab Results   Component Value Date    RDW 14.4 09/10/2021     Lab Results   Component Value Date     09/10/2021     MELD-Na score: 9 at 3/18/2022  1:28 PM  MELD score: 9 at 3/18/2022  1:28 PM  Calculated from:  Serum Creatinine: 0.92 mg/dL (Using min of 1 mg/dL) at 3/18/2022  1:28 PM  Serum Sodium: 137 mmol/L at 3/18/2022  1:28 PM  Total Bilirubin: 1.0 mg/dL at 3/18/2022  1:28 PM  INR(ratio): 1.33 at 3/18/2022  1:28 PM  Age: 41 years    Imaging:    CT AP 2/10/2022    FINDINGS:     Abdomen and pelvis:   Cirrhotic morphology of the liver. No discrete hepatic lesion is  visualized. TIPS appears patent. No calcified gallstones or  gallbladder wall thickening. Splenomegaly measuring 16.7 cm in  craniocaudal dimensions. The adrenal glands, pancreas and kidneys are  unremarkable. No hydronephrosis or hydroureter. No pelvic mass.     The small and large bowel is normal in caliber without  evidence of a  bowel obstruction. Moderate colonic stool burden. The major  intra-abdominal vasculature is patent and normal in caliber. No  enlarged abdominal or pelvic lymph nodes. Moderate volume of abdominal  ascites. No intra-abdominal free air.     Lung bases: Bibasilar atelectasis. No pleural effusion. Heart size is  normal. No pericardial effusion.     Bones and soft tissues: No acute or aggressive appearing osseous  lesion. Degenerative changes of the spine.     IMPRESSION:   1. TIPS appears patent and appropriately positioned.  2. Cirrhosis and signs of portal hypertension including splenomegaly  and ascites.  3. Moderate colonic stool burden.     CT AP 8/27/2021    ABDOMEN/PELVIS:     No free intraperitoneal air. Large volume ascites. Ascites extends into a noninflamed umbilical ventral wall hernia.     Heterogeneous and cirrhotic appearance of the liver. The portal venous system appears patent. Portosystemic collateralization includes but is not limited to gastroesophageal varices. The spleen is mildly enlarged at 16 cm oblique craniocaudad dimension.     Gallbladder, pancreas, adrenals, kidneys, and pelvic organs are unremarkable.     No bowel obstruction. Mild wall thickening of multiple small bowel loops and portions of the colon may reflect portal enteropathy and colopathy. The appendix is normal.     Small lymph nodes are scattered throughout the upper abdomen, mesentery, and retroperitoneum. These are nonspecific, but likely reactive in a patient with chronic liver disease.     MUSCULOSKELETAL: No aggressive appearing bone lesion.     IMPRESSION:     1. Heterogeneous and cirrhotic appearing liver with portosystemic collateralization and mild splenomegaly.   2. Large volume ascites.   3. Mild wall thickening of multiple small bowel loops and portions of the colon likely reflects portal enteropathy and colopathy, though other types of enteritis and colitis are not excluded.   4. Noninflamed umbilical  hernia containing ascites.    Endoscopy: Reviewed in EHR    Independently reviewed labs and imaging.      Assessment/Plan: Mr. Nowak is a 41 year old man with a history of AUD, alcohol related cirrhosis c/b refractory ascites/SBP, recurrent variceal bleeding, now s/p TIPS 9/22.     Ascites improved post TIPS, but had issues with umbilical hernia after his TIPS and on 11/11 was unable to reduce and required surgical repair.     On diuretics for some ascites and LE edema. He still has some perihepatic ascites on imaging, potentially worsening his abdominal distention and hernia.     -Lasix 80 mg twice a day, start eplerenone 25 mg daily for perihepatic ascites, see if this improves hernia. Discussed TIPS revision with IR, no plan for this at this time based on US.  Recheck labs next week to ensure electrolytes are stable.  Consider referral to another surgeon for second opinion once ascites is resolved.  -Continue low sodium diet  -Followed by complex care management RN  -Discussed complete sobriety from alcohol, including NA beers  HCC screening with AFP March 2022, abdominal ultrasound April 2022 without masses.  -Recommend taking daily iron.  Hemoglobin and ferritin next week    RTC 6 months    Cadence Murray MD MS  Hepatology/Liver Transplant  [facility]

## 2022-05-20 NOTE — LETTER
5/20/2022         RE: Florian Nowak  [address]  [facility] [address]        Dear Colleague,    Thank you for referring your patient, Florian Nowak, to the [facility] [place]. Please see a copy of my visit note below.    [facility] Return Patient Visit    Date of Visit: 5/20/2022    Reason for referral: Alcohol related liver disease    Subjective: Mr. Nowak is a 41 year old man with a history of AUD, alcohol related cirrhosis c/b refractory ascites, SBP, variceal bleeding, s/p TIPS 9/22/2021, who presents for routine follow up     Interval Events:  - Still has some perihepatic ascites on imaging. Taking Lasix 80 mg twice a day.  Had gynecomastia with spironolactone. Having abdominal discomfort around his hernia site, seeing surgery here who does not feel he clinically has a hernia. He note that he will have a protrusion around his umbilical hernia repair but it is reducible. This is very painful  - Taking iron pills consistently for about a month  - Otherwise doing. Diagnosed with macular degeneration    ROS: 14 point ROS negative except for positives noted in HPI.    PMHx:  - AUD  - Alcohol related cirrhosis c/b refractory ascites/SBP, variceal bleeding  - Asthma  - HTN    PSHx:  - Back surgery microdisectomy    FamHx:  No family history of liver disease, liver cancer    SocHx:  Social History     Socioeconomic History     Marital status:      Spouse name: Not on file     Number of children: Not on file     Years of education: Not on file     Highest education level: Not on file   Occupational History     Not on file   Tobacco Use     Smoking status: Former Smoker     Smokeless tobacco: Never Used     Tobacco comment: College   Substance and Sexual Activity     Alcohol use: Not Currently     Comment: Quit ETOH 10/2020     Drug use: Not Currently     Sexual activity: Not on file   Other Topics Concern     Parent/sibling w/  CABG, MI or angioplasty before 65F 55M? 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   No NSAIDs    Allergies:  Allergies   Allergen Reactions     Ketorolac Tromethamine Other (See Comments)       Objective:  There were no vitals taken for this visit.  Constitutional: pleasant man in NAD  Eyes: non icteric  Respiratory: Normal respiratory excursion   Abd: Non distended, incision c/d/i  Skin: No jaundice  Psychiatric: normal mood and orientation    Labs:  Last Comprehensive Metabolic Panel:  Sodium   Date Value Ref Range Status   03/18/2022 137 136 - 145 mmol/L Final     Potassium   Date Value Ref Range Status   03/18/2022 3.7 3.5 - 5.0 mmol/L Final     Chloride   Date Value Ref Range Status   03/18/2022 104 98 - 107 mmol/L Final     Carbon Dioxide (CO2)   Date Value Ref Range Status   03/18/2022 26 22 - 31 mmol/L Final     Anion Gap   Date Value Ref Range Status   03/18/2022 7 5 - 18 mmol/L Final     Glucose   Date Value Ref Range Status   03/18/2022 89 70 - 125 mg/dL Final     Urea Nitrogen   Date Value Ref Range Status   03/18/2022 17 8 - 22 mg/dL Final     Creatinine   Date  Value Ref Range Status   03/18/2022 0.92 0.70 - 1.30 mg/dL Final     GFR Estimate   Date Value Ref Range Status   03/18/2022 >90 >60 mL/min/1.73m2 Final     Comment:     Effective December 21, 2021 eGFRcr in adults is calculated using the 2021 CKD-EPI creatinine equation which includes age and gender (Brijesh et al., NE, DOI: 10.1056/GBXHls9439342)     Calcium   Date Value Ref Range Status   03/18/2022 8.7 8.5 - 10.5 mg/dL Final     Bilirubin Total   Date Value Ref Range Status   03/18/2022 1.0 0.0 - 1.0 mg/dL Final     Alkaline Phosphatase   Date Value Ref Range Status   03/18/2022 129 (H) 45 - 120 U/L Final     ALT   Date Value Ref Range Status   03/18/2022 23 0 - 45 U/L Final     AST   Date Value Ref Range Status   03/18/2022 47 (H) 0 - 40 U/L Final       Lab Results   Component Value Date    WBC 8.5 08/18/2021     Lab Results   Component Value Date    RBC 3.26 08/18/2021     Lab Results   Component Value Date    HGB 9.7 08/18/2021     Lab Results   Component Value Date    HCT 29.0 08/18/2021     Lab Results   Component Value Date    MCV 89 08/18/2021     Lab Results   Component Value Date    MCH 29.8 08/18/2021     Lab Results   Component Value Date    MCHC 33.4 08/18/2021     Lab Results   Component Value Date    RDW 14.2 08/18/2021     Lab Results   Component Value Date     08/18/2021       INR   Date Value Ref Range Status   03/18/2022 1.33 (H) 0.85 - 1.15 Final     Last Comprehensive Metabolic Panel:  Sodium   Date Value Ref Range Status   03/18/2022 137 136 - 145 mmol/L Final     Potassium   Date Value Ref Range Status   03/18/2022 3.7 3.5 - 5.0 mmol/L Final     Chloride   Date Value Ref Range Status   03/18/2022 104 98 - 107 mmol/L Final     Carbon Dioxide (CO2)   Date Value Ref Range Status   03/18/2022 26 22 - 31 mmol/L Final     Anion Gap   Date Value Ref Range Status   03/18/2022 7 5 - 18 mmol/L Final     Glucose   Date Value Ref Range Status   03/18/2022 89 70 - 125 mg/dL Final     Urea  Nitrogen   Date Value Ref Range Status   03/18/2022 17 8 - 22 mg/dL Final     Creatinine   Date Value Ref Range Status   03/18/2022 0.92 0.70 - 1.30 mg/dL Final     GFR Estimate   Date Value Ref Range Status   03/18/2022 >90 >60 mL/min/1.73m2 Final     Comment:     Effective December 21, 2021 eGFRcr in adults is calculated using the 2021 CKD-EPI creatinine equation which includes age and gender (Brijesh et al., NE, DOI: 10.1056/AFPLfr3575805)     Calcium   Date Value Ref Range Status   03/18/2022 8.7 8.5 - 10.5 mg/dL Final     Bilirubin Total   Date Value Ref Range Status   03/18/2022 1.0 0.0 - 1.0 mg/dL Final     Alkaline Phosphatase   Date Value Ref Range Status   03/18/2022 129 (H) 45 - 120 U/L Final     ALT   Date Value Ref Range Status   03/18/2022 23 0 - 45 U/L Final     AST   Date Value Ref Range Status   03/18/2022 47 (H) 0 - 40 U/L Final             Lab Results   Component Value Date    WBC 8.3 09/10/2021     Lab Results   Component Value Date    RBC 2.64 09/10/2021     Lab Results   Component Value Date    HGB 7.9 09/10/2021     Lab Results   Component Value Date    HCT 24.3 09/10/2021     No components found for: MCT  Lab Results   Component Value Date    MCV 92 09/10/2021     Lab Results   Component Value Date    MCH 29.9 09/10/2021     Lab Results   Component Value Date    MCHC 32.5 09/10/2021     Lab Results   Component Value Date    RDW 14.4 09/10/2021     Lab Results   Component Value Date     09/10/2021     MELD-Na score: 9 at 3/18/2022  1:28 PM  MELD score: 9 at 3/18/2022  1:28 PM  Calculated from:  Serum Creatinine: 0.92 mg/dL (Using min of 1 mg/dL) at 3/18/2022  1:28 PM  Serum Sodium: 137 mmol/L at 3/18/2022  1:28 PM  Total Bilirubin: 1.0 mg/dL at 3/18/2022  1:28 PM  INR(ratio): 1.33 at 3/18/2022  1:28 PM  Age: 41 years    Imaging:    CT AP 2/10/2022    FINDINGS:     Abdomen and pelvis:   Cirrhotic morphology of the liver. No discrete hepatic lesion is  visualized. TIPS appears patent. No  calcified gallstones or  gallbladder wall thickening. Splenomegaly measuring 16.7 cm in  craniocaudal dimensions. The adrenal glands, pancreas and kidneys are  unremarkable. No hydronephrosis or hydroureter. No pelvic mass.     The small and large bowel is normal in caliber without evidence of a  bowel obstruction. Moderate colonic stool burden. The major  intra-abdominal vasculature is patent and normal in caliber. No  enlarged abdominal or pelvic lymph nodes. Moderate volume of abdominal  ascites. No intra-abdominal free air.     Lung bases: Bibasilar atelectasis. No pleural effusion. Heart size is  normal. No pericardial effusion.     Bones and soft tissues: No acute or aggressive appearing osseous  lesion. Degenerative changes of the spine.     IMPRESSION:   1. TIPS appears patent and appropriately positioned.  2. Cirrhosis and signs of portal hypertension including splenomegaly  and ascites.  3. Moderate colonic stool burden.     CT AP 8/27/2021    ABDOMEN/PELVIS:     No free intraperitoneal air. Large volume ascites. Ascites extends into a noninflamed umbilical ventral wall hernia.     Heterogeneous and cirrhotic appearance of the liver. The portal venous system appears patent. Portosystemic collateralization includes but is not limited to gastroesophageal varices. The spleen is mildly enlarged at 16 cm oblique craniocaudad dimension.     Gallbladder, pancreas, adrenals, kidneys, and pelvic organs are unremarkable.     No bowel obstruction. Mild wall thickening of multiple small bowel loops and portions of the colon may reflect portal enteropathy and colopathy. The appendix is normal.     Small lymph nodes are scattered throughout the upper abdomen, mesentery, and retroperitoneum. These are nonspecific, but likely reactive in a patient with chronic liver disease.     MUSCULOSKELETAL: No aggressive appearing bone lesion.     IMPRESSION:     1. Heterogeneous and cirrhotic appearing liver with portosystemic  collateralization and mild splenomegaly.   2. Large volume ascites.   3. Mild wall thickening of multiple small bowel loops and portions of the colon likely reflects portal enteropathy and colopathy, though other types of enteritis and colitis are not excluded.   4. Noninflamed umbilical hernia containing ascites.    Endoscopy: Reviewed in EHR    Independently reviewed labs and imaging.      Assessment/Plan: Mr. Nowak is a 41 year old man with a history of AUD, alcohol related cirrhosis c/b refractory ascites/SBP, recurrent variceal bleeding, now s/p TIPS 9/22.     Ascites improved post TIPS, but had issues with umbilical hernia after his TIPS and on 11/11 was unable to reduce and required surgical repair.     On diuretics for some ascites and LE edema. He still has some perihepatic ascites on imaging, potentially worsening his abdominal distention and hernia.     -Lasix 80 mg twice a day, start eplerenone 25 mg daily for perihepatic ascites, see if this improves hernia. Discussed TIPS revision with IR, no plan for this at this time based on US.  Recheck labs next week to ensure electrolytes are stable.  Consider referral to another surgeon for second opinion once ascites is resolved.  -Continue low sodium diet  -Followed by complex care management RN  -Discussed complete sobriety from alcohol, including NA beers  HCC screening with AFP March 2022, abdominal ultrasound April 2022 without masses.  -Recommend taking daily iron.  Hemoglobin and ferritin next week    RTC 6 months    Cadence Murray MD MS  Hepatology/Liver Transplant  [facility]

## 2022-05-26 ENCOUNTER — PATIENT OUTREACH (OUTPATIENT)
Dept: GASTROENTEROLOGY | Facility: CLINIC | Age: 42
End: 2022-05-26

## 2022-05-26 NOTE — PROGRESS NOTES
Pt had hepatology follow up on 5/20. Plan established per Dr. Murray:     1) Start eplerenone 25 mg daily for perihepatic ascites  2) Continue low Na diet  4) Continue taking daily iron supplement  4) Complete sobriety from alcohol, including NA beers  5) HCC screening due 10/2022  6)  Repeat labs in 1 week (CMP, CBC, and Ferritin)  7) RTC in 6 months      Attempted to reach patient for check in and to review plan of care, no answer, message left requesting call back, number provided.

## 2022-06-01 ENCOUNTER — LAB (OUTPATIENT)
Dept: LAB | Facility: CLINIC | Age: 42
End: 2022-06-01
Payer: COMMERCIAL

## 2022-06-01 DIAGNOSIS — Z95.828 S/P TIPS (TRANSJUGULAR INTRAHEPATIC PORTOSYSTEMIC SHUNT): ICD-10-CM

## 2022-06-01 DIAGNOSIS — D62 ACUTE BLOOD LOSS ANEMIA: ICD-10-CM

## 2022-06-01 LAB
ALBUMIN SERPL-MCNC: 3.5 G/DL (ref 3.5–5)
ALP SERPL-CCNC: 142 U/L (ref 45–120)
ALT SERPL W P-5'-P-CCNC: 34 U/L (ref 0–45)
ANION GAP SERPL CALCULATED.3IONS-SCNC: 11 MMOL/L (ref 5–18)
AST SERPL W P-5'-P-CCNC: 48 U/L (ref 0–40)
BILIRUB SERPL-MCNC: 2 MG/DL (ref 0–1)
BUN SERPL-MCNC: 12 MG/DL (ref 8–22)
CALCIUM SERPL-MCNC: 8.7 MG/DL (ref 8.5–10.5)
CHLORIDE BLD-SCNC: 104 MMOL/L (ref 98–107)
CO2 SERPL-SCNC: 24 MMOL/L (ref 22–31)
CREAT SERPL-MCNC: 0.84 MG/DL (ref 0.7–1.3)
ERYTHROCYTE [DISTWIDTH] IN BLOOD BY AUTOMATED COUNT: 15.7 % (ref 10–15)
FERRITIN SERPL-MCNC: 110 NG/ML (ref 27–300)
GFR SERPL CREATININE-BSD FRML MDRD: >90 ML/MIN/1.73M2
GLUCOSE BLD-MCNC: 80 MG/DL (ref 70–125)
HCT VFR BLD AUTO: 36.9 % (ref 40–53)
HGB BLD-MCNC: 12.6 G/DL (ref 13.3–17.7)
MCH RBC QN AUTO: 29.7 PG (ref 26.5–33)
MCHC RBC AUTO-ENTMCNC: 34.1 G/DL (ref 31.5–36.5)
MCV RBC AUTO: 87 FL (ref 78–100)
PLATELET # BLD AUTO: 115 10E3/UL (ref 150–450)
POTASSIUM BLD-SCNC: 4 MMOL/L (ref 3.5–5)
PROT SERPL-MCNC: 7.1 G/DL (ref 6–8)
RBC # BLD AUTO: 4.24 10E6/UL (ref 4.4–5.9)
SODIUM SERPL-SCNC: 139 MMOL/L (ref 136–145)
WBC # BLD AUTO: 5 10E3/UL (ref 4–11)

## 2022-06-01 PROCEDURE — 36415 COLL VENOUS BLD VENIPUNCTURE: CPT

## 2022-06-01 PROCEDURE — 80053 COMPREHEN METABOLIC PANEL: CPT

## 2022-06-01 PROCEDURE — 85027 COMPLETE CBC AUTOMATED: CPT

## 2022-06-01 PROCEDURE — 82728 ASSAY OF FERRITIN: CPT

## 2022-06-02 NOTE — PROGRESS NOTES
"Pt reported no side effects with starting eplerenone 25 mg daily for perihepatic ascites. Pt reported mild lower extremity edema. Pt got labs drawn on 6/1 and is wondering if he should increase his eplerenone. Reviewed that Dr. Murray has not reviewed lab results yet and Dr. Murray or writer will reach out with any recommendations.     Pt reported compliance with iron supplements and will continue to take medication. Pt stated that his energy level is \"okay\" but he still fatigues easily. Reviewed energy conservation techniques and encouraged pt to utilize these techniques.     Pt reported moderate abdominal pain and is frustrated with general surgery's recommendations. Pt stated that he was told that he would need to lose weight (weigh 225 lbs) and be ascites free for 6 months. Acknowledged pt's frustration. Discussed the high risk of complications with surgical repair of hernia and that pt will need to be ascites free for 6 months for any surgeon to want to proceed with an elective repair. Reviewed that hopefully ascites will resolve with increased diuretic doses.   "

## 2022-07-22 DIAGNOSIS — Z95.828 S/P TIPS (TRANSJUGULAR INTRAHEPATIC PORTOSYSTEMIC SHUNT): ICD-10-CM

## 2022-07-22 RX ORDER — EPLERENONE 25 MG/1
25 TABLET, FILM COATED ORAL DAILY
Qty: 90 TABLET | Refills: 3 | Status: SHIPPED | OUTPATIENT
Start: 2022-07-22 | End: 2023-08-22

## 2022-07-25 ENCOUNTER — OFFICE VISIT (OUTPATIENT)
Dept: TRANSPLANT | Facility: CLINIC | Age: 42
End: 2022-07-25
Attending: TRANSPLANT SURGERY
Payer: COMMERCIAL

## 2022-07-25 VITALS
SYSTOLIC BLOOD PRESSURE: 117 MMHG | BODY MASS INDEX: 33.44 KG/M2 | HEART RATE: 71 BPM | OXYGEN SATURATION: 100 % | WEIGHT: 246.6 LBS | DIASTOLIC BLOOD PRESSURE: 71 MMHG

## 2022-07-25 DIAGNOSIS — K70.31 ALCOHOLIC CIRRHOSIS OF LIVER WITH ASCITES (H): Primary | ICD-10-CM

## 2022-07-25 PROCEDURE — 99203 OFFICE O/P NEW LOW 30 MIN: CPT | Performed by: TRANSPLANT SURGERY

## 2022-07-25 NOTE — PROGRESS NOTES
HPI      ROS      Physical Exam    Patient has been diagnosed to have Laënnec cirrhosis.  He decompensated in the form of ascites.  He received a TIPS shunt and the ascites has significantly improved.    Present complaints:    Recurrent umbilical hernia    The hernia gets bigger towards evening and is causing pain and disturbing him.  No history of a reducibility or obstruction.  In the past he had an emergency umbilical hernia repaired no mesh was used.    Past Medical History:   Diagnosis Date     Diabetes (H)      History of blood transfusion      Hypertension      Past Surgical History:   Procedure Laterality Date     COLONOSCOPY N/A 4/28/2022    Procedure: COLONOSCOPY, WITH POLYPECTOMY AND CLIPS;  Surgeon: Cadence Murray MD;  Location: [facility] OR     ESOPHAGOSCOPY, GASTROSCOPY, DUODENOSCOPY (EGD), COMBINED N/A 4/28/2022    Procedure: ESOPHAGOGASTRODUODENOSCOPY, WITH BIOPSY;  Surgeon: Cadence Murray MD;  Location: [facility] OR     HERNIORRHAPHY UMBILICAL N/A 11/11/2021    Procedure: HERNIORRHAPHY, UMBILICAL, OPEN;  Surgeon: Costa Malave MD;  Location: [facility] OR     Past Surgical History:   Procedure Laterality Date     COLONOSCOPY N/A 4/28/2022    Procedure: COLONOSCOPY, WITH POLYPECTOMY AND CLIPS;  Surgeon: Cadence Murray MD;  Location: [facility] OR     ESOPHAGOSCOPY, GASTROSCOPY, DUODENOSCOPY (EGD), COMBINED N/A 4/28/2022    Procedure: ESOPHAGOGASTRODUODENOSCOPY, WITH BIOPSY;  Surgeon: Cadence Murray MD;  Location: [facility] OR     HERNIORRHAPHY UMBILICAL N/A 11/11/2021    Procedure: HERNIORRHAPHY, UMBILICAL, OPEN;  Surgeon: Costa Malave MD;  Location: [facility] OR       Patient Active Problem List   Diagnosis     Alcoholic cirrhosis of liver with ascites (H)     Atypical depression     Drug-induced erectile dysfunction     HTN (hypertension)     Mild intermittent asthma without complication     Obesity (BMI 30-39.9)     Acute blood loss anemia     Acute upper GI bleeding     DELLA (acute kidney injury) (H)     Chronic  hyponatremia     Coagulopathy (H)     Hyperlipidemia     SBP (spontaneous bacterial peritonitis) (H)     Incarcerated umbilical hernia     Morbid obesity (H)       Current Outpatient Medications   Medication Sig Dispense Refill     eplerenone (INSPRA) 25 MG tablet Take 1 tablet (25 mg) by mouth daily 90 tablet 3     ferrous sulfate (FEROSUL) 325 (65 Fe) MG tablet Take 325 mg by mouth daily (with breakfast)       furosemide (LASIX) 40 MG tablet Take 2 tablets (80 mg) by mouth 2 times daily 360 tablet 1     ondansetron (ZOFRAN) 4 MG tablet Take 1 tablet (4 mg) by mouth every 8 hours as needed for nausea 20 tablet 0     ciprofloxacin (CIPRO) 500 MG tablet Take 1 tablet (500 mg) by mouth daily (Patient not taking: Reported on 3/22/2022) 90 tablet 3     fish oil-omega-3 fatty acids 1000 MG capsule Take 2 g by mouth daily       magnesium 250 MG tablet Take 1 tablet by mouth daily (Patient not taking: No sig reported)       vitamin D3 (CHOLECALCIFEROL) 250 mcg (69493 units) capsule Take 1 capsule by mouth daily            Allergies   Allergen Reactions     Ketorolac Tromethamine Other (See Comments)       Vitals:    07/25/22 0935   BP: 117/71   Pulse: 71   SpO2: 100%   Weight: 111.9 kg (246 lb 9.6 oz)     Examination of the abdomen:  A small 2 x 2 centimeter hernia present.      Clinical impression:    Umbilical hernia recurrent  MELD-Na score: 9 at 3/18/2022  1:28 PM  MELD score: 9 at 3/18/2022  1:28 PM  Calculated from:  Serum Creatinine: 0.92 mg/dL (Using min of 1 mg/dL) at 3/18/2022  1:28 PM  Serum Sodium: 137 mmol/L at 3/18/2022  1:28 PM  Total Bilirubin: 1.0 mg/dL at 3/18/2022  1:28 PM  INR(ratio): 1.33 at 3/18/2022  1:28 PM  Age: 41 years    Recommendations  Open mesh repair, risk benefits alternatives of hernia repair explained.      Would recommend ultrasound CBC and INR prior to surgery.

## 2022-07-25 NOTE — LETTER
7/25/2022         RE: Florian Nowak  [address]  [place] [facility] [address]        Dear Colleague,    Thank you for referring your patient, Florian Nowak, to the [facility]. Please see a copy of my visit note below.    HPI      ROS      Physical Exam    Patient has been diagnosed to have Laënnec cirrhosis.  He decompensated in the form of ascites.  He received a TIPS shunt and the ascites has significantly improved.    Present complaints:    Recurrent umbilical hernia    The hernia gets bigger towards evening and is causing pain and disturbing him.  No history of a reducibility or obstruction.  In the past he had an emergency umbilical hernia repaired no mesh was used.    Past Medical History:   Diagnosis Date     Diabetes (H)      History of blood transfusion      Hypertension      Past Surgical History:   Procedure Laterality Date     COLONOSCOPY N/A 4/28/2022    Procedure: COLONOSCOPY, WITH POLYPECTOMY AND CLIPS;  Surgeon: Cadence Murray MD;  Location: [facility]     ESOPHAGOSCOPY, GASTROSCOPY, DUODENOSCOPY (EGD), COMBINED N/A 4/28/2022    Procedure: ESOPHAGOGASTRODUODENOSCOPY, WITH BIOPSY;  Surgeon: Cadence Murray MD;  Location: [facility] OR     HERNIORRHAPHY UMBILICAL N/A 11/11/2021    Procedure: HERNIORRHAPHY, UMBILICAL, OPEN;  Surgeon: Costa Malave MD;  Location: [facility] OR     Past Surgical History:   Procedure Laterality Date     COLONOSCOPY N/A 4/28/2022    Procedure: COLONOSCOPY, WITH POLYPECTOMY AND CLIPS;  Surgeon: Cadence Murray MD;  Location: [facility]     ESOPHAGOSCOPY, GASTROSCOPY, DUODENOSCOPY (EGD), COMBINED N/A 4/28/2022    Procedure: ESOPHAGOGASTRODUODENOSCOPY, WITH BIOPSY;  Surgeon: Cadence Murray MD;  Location: [facility] OR     HERNIORRHAPHY UMBILICAL N/A 11/11/2021    Procedure: HERNIORRHAPHY, UMBILICAL, OPEN;  Surgeon: Costa Malave MD;  Location: [facility] OR       Patient Active Problem List   Diagnosis     Alcoholic cirrhosis of liver with ascites (H)      Atypical depression     Drug-induced erectile dysfunction     HTN (hypertension)     Mild intermittent asthma without complication     Obesity (BMI 30-39.9)     Acute blood loss anemia     Acute upper GI bleeding     DELLA (acute kidney injury) (H)     Chronic hyponatremia     Coagulopathy (H)     Hyperlipidemia     SBP (spontaneous bacterial peritonitis) (H)     Incarcerated umbilical hernia     Morbid obesity (H)       Current Outpatient Medications   Medication Sig Dispense Refill     eplerenone (INSPRA) 25 MG tablet Take 1 tablet (25 mg) by mouth daily 90 tablet 3     ferrous sulfate (FEROSUL) 325 (65 Fe) MG tablet Take 325 mg by mouth daily (with breakfast)       furosemide (LASIX) 40 MG tablet Take 2 tablets (80 mg) by mouth 2 times daily 360 tablet 1     ondansetron (ZOFRAN) 4 MG tablet Take 1 tablet (4 mg) by mouth every 8 hours as needed for nausea 20 tablet 0     ciprofloxacin (CIPRO) 500 MG tablet Take 1 tablet (500 mg) by mouth daily (Patient not taking: Reported on 3/22/2022) 90 tablet 3     fish oil-omega-3 fatty acids 1000 MG capsule Take 2 g by mouth daily       magnesium 250 MG tablet Take 1 tablet by mouth daily (Patient not taking: No sig reported)       vitamin D3 (CHOLECALCIFEROL) 250 mcg (24168 units) capsule Take 1 capsule by mouth daily            Allergies   Allergen Reactions     Ketorolac Tromethamine Other (See Comments)       Vitals:    07/25/22 0935   BP: 117/71   Pulse: 71   SpO2: 100%   Weight: 111.9 kg (246 lb 9.6 oz)     Examination of the abdomen:  A small 2 x 2 centimeter hernia present.      Clinical impression:    Umbilical hernia recurrent  MELD-Na score: 9 at 3/18/2022  1:28 PM  MELD score: 9 at 3/18/2022  1:28 PM  Calculated from:  Serum Creatinine: 0.92 mg/dL (Using min of 1 mg/dL) at 3/18/2022  1:28 PM  Serum Sodium: 137 mmol/L at 3/18/2022  1:28 PM  Total Bilirubin: 1.0 mg/dL at 3/18/2022  1:28 PM  INR(ratio): 1.33 at 3/18/2022  1:28 PM  Age: 41  years    Recommendations  Open mesh repair, risk benefits alternatives of hernia repair explained.      Would recommend ultrasound CBC and INR prior to surgery.      Again, thank you for allowing me to participate in the care of your patient.        Sincerely,        Burak Real MD

## 2022-08-02 ENCOUNTER — PATIENT OUTREACH (OUTPATIENT)
Dept: GASTROENTEROLOGY | Facility: CLINIC | Age: 42
End: 2022-08-02

## 2022-08-02 NOTE — PROGRESS NOTES
Pt had an appointment with Dr. Real on 7/25 to assess recurrent umbilical hernia. Per chart review, Dr. Real recommended open mesh repair of hernia.     Pt confirmed that he will have surgical repair of recurrent hernia. Pt stated that he has not heard from Dr. Real's office regarding timing of procedure. Pt reported increased pain at hernia site but that hernia is reducible. Pt is experiencing intermittent nausea but no vomiting. Pt's appetite is fair.     Pt continues to take diuretics as prescribed. Denied lower extremity edema and ascites.     Will check in with pt in 3-4 weeks. Pt verbalized understanding and is agreeable to plan of care.

## 2022-08-18 ENCOUNTER — PREP FOR PROCEDURE (OUTPATIENT)
Dept: TRANSPLANT | Facility: CLINIC | Age: 42
End: 2022-08-18

## 2022-08-18 DIAGNOSIS — K42.9 HERNIA, UMBILICAL: Primary | ICD-10-CM

## 2022-08-24 NOTE — PROGRESS NOTES
Per chart review, pt is scheduled for open hernia repair on 9/14.     Pt reported no changes to hernia. Pt's hernia continues to protrude throughout the day but is reducible. Pt has intermittent nausea but is able to tolerate a regular diet. Pt is complaint with diuretics and denied lower extremity edema.    Pt denied hematemesis, hematochezia, melena. Pt is taking oral iron every other day and energy level is improved.     Will check in with pt in 1 month. Encouraged pt to reach out with any questions or concerns. Pt verbalized understanding and is agreeable to plan of care.

## 2022-09-12 NOTE — OR NURSING
"Message  Received: Today  Lalitha Meléndez MD Johnson, Judy, RN  He should take his lasix please     Lalitha             Previous Messages       ----- Message -----   From: Ruthann Shah RN   Sent: 9/12/2022   2:41 PM CDT   To: Lalitha Meléndez MD, [facility]     Hi Dr. Meléndez,     Pt is scheduled for Open Umbilical Hernia Repair with Mesh on 9/14.     Note by Dr Real on says 7/25 \"pt was diagnosed with Laënnec cirrhosis.  He decompensated in the form of ascites.  He received a TIPS shunt and the ascites has significantly improved\".     He takes Eplerenone 25 mg daily  and Lasix 80 MG bid. Should he take or hold Diuretics on DOS?     Thanks so much.   Ruthann Shah, RN, BSN   Pre-Anesthesia Screening   [phone] office   [phone] Direct Line        "

## 2022-09-12 NOTE — OR NURSING
"RE: H&P  Received: Today  Ruthann Shah, Helen Green; Meagan Sena; Lydia Fong, YANET  Cc: Dora Artis all,     I was just going to do pt's pre-op call for his surgery on 9/14. I do not see an H&P appt in his chart. Do you know yet if you can arrange a pre-op H&P in clinic for him tomorrow or will his H&P be done on DOS?     Also, Dr Real's note on 7/25 says \" Would recommend ultrasound CBC and INR prior to surgery.\" Do you know if there is a plan to do these before surgery?     Thanks so much.     Ruthann Shah RN, BSN   Pre-Anesthesia Screening   [phone]         PAS Notification: Your patient is scheduled for surgery in 2 days. Critical chart components are missing. If the required components are not completed in EPIC by noon the day prior to surgery your case may be rescheduled. Thank you in advance for completing the record and improving [facility]'s quality of care.     Surgeon's Name:    Date of Surgery: 9/14/22   Procedure: Open Umbilical Hernia Repair with Mesh     Missing Components:H&P within 30 days and  Dr Real's note on 7/25 says \" Would recommend ultrasound CBC and INR prior to surgery\".                       Previous Messages       ----- Message -----   From: Helen Conner   Sent: 9/12/2022   8:54 AM CDT   To: Meagan Cortez   Subject: RE: H&P                                           Hi Dora,     I will see if our NP Lydia can see the patient tomorrow, otherwise we will have a fellow see him.     Thank you!   Helen   ----- Message -----   From: Meagan Sena   Sent: 9/12/2022   8:40 AM CDT   To: Dora Riley   Subject: RE: H&P                                           Hi Dora,     Looping in Helen here - Helen see below - do you know when the H&P is supposed to happen?     ST     ----- Message -----   From: Dora Artis   Sent: 9/10/2022  12:21 AM CDT   To: Meagan Sena   Subject: H&P                             "                   Dr.Chinnakolta Eli has this patient scheduled for 09/14 for a hernia repair, does your office/team have a H&P plan set up for this patient? Will  be able to update DOS from his visit from 07/25, looks like per his [employer] message he currently does not have a PCP in place and is unable to get an H&P done before his procedure.     Thank you,       Dora

## 2022-09-13 ENCOUNTER — LAB (OUTPATIENT)
Dept: LAB | Facility: CLINIC | Age: 42
End: 2022-09-13
Payer: COMMERCIAL

## 2022-09-13 ENCOUNTER — ANESTHESIA EVENT (OUTPATIENT)
Dept: SURGERY | Facility: CLINIC | Age: 42
End: 2022-09-13
Payer: COMMERCIAL

## 2022-09-13 DIAGNOSIS — K70.31 ALCOHOLIC CIRRHOSIS OF LIVER WITH ASCITES (H): ICD-10-CM

## 2022-09-13 LAB
BASOPHILS # BLD AUTO: 0 10E3/UL (ref 0–0.2)
BASOPHILS NFR BLD AUTO: 0 %
EOSINOPHIL # BLD AUTO: 0.1 10E3/UL (ref 0–0.7)
EOSINOPHIL NFR BLD AUTO: 2 %
ERYTHROCYTE [DISTWIDTH] IN BLOOD BY AUTOMATED COUNT: 13.7 % (ref 10–15)
HCT VFR BLD AUTO: 35.6 % (ref 40–53)
HGB BLD-MCNC: 12.7 G/DL (ref 13.3–17.7)
IMM GRANULOCYTES # BLD: 0 10E3/UL
IMM GRANULOCYTES NFR BLD: 0 %
INR PPP: 1.29 (ref 0.85–1.15)
LYMPHOCYTES # BLD AUTO: 2.2 10E3/UL (ref 0.8–5.3)
LYMPHOCYTES NFR BLD AUTO: 36 %
MCH RBC QN AUTO: 32.2 PG (ref 26.5–33)
MCHC RBC AUTO-ENTMCNC: 35.7 G/DL (ref 31.5–36.5)
MCV RBC AUTO: 90 FL (ref 78–100)
MONOCYTES # BLD AUTO: 0.6 10E3/UL (ref 0–1.3)
MONOCYTES NFR BLD AUTO: 10 %
NEUTROPHILS # BLD AUTO: 3 10E3/UL (ref 1.6–8.3)
NEUTROPHILS NFR BLD AUTO: 51 %
PLATELET # BLD AUTO: 133 10E3/UL (ref 150–450)
RBC # BLD AUTO: 3.95 10E6/UL (ref 4.4–5.9)
WBC # BLD AUTO: 6 10E3/UL (ref 4–11)

## 2022-09-13 PROCEDURE — 36415 COLL VENOUS BLD VENIPUNCTURE: CPT

## 2022-09-13 PROCEDURE — 85610 PROTHROMBIN TIME: CPT

## 2022-09-13 PROCEDURE — 85025 COMPLETE CBC W/AUTO DIFF WBC: CPT

## 2022-09-13 NOTE — OR NURSING
"RE: H&P  Received: Today  Helen Conner Judy, RN; Meagan Sena; Lydia Fong NP  Cc: Dora Artis,     So he is scheduled for his lab draw today, and will have his ultrasound tomorrow before surgery. He will also have his H&P done on DOS.     Thank you!!   Helen             Previous Messages       ----- Message -----   From: Ruthann Shah RN   Sent: 9/12/2022   2:55 PM CDT   To: Dora Riley Laura M Schuller, NP, *   Subject: RE: H&P                                           Hi all,     I was just going to do pt's pre-op call for his surgery on 9/14. I do not see an H&P appt in his chart. Do you know yet if you can arrange a pre-op H&P in clinic for him tomorrow or will his H&P be done on DOS?     Also, Dr Real's note on 7/25 says \" Would recommend ultrasound CBC and INR prior to surgery.\" Do you know if there is a plan to do these before surgery?     Thanks so much.     Ruthann Shah RN, BSN   Pre-Anesthesia Screening   [phone]         PAS Notification: Your patient is scheduled for surgery in 2 days. Critical chart components are missing. If the required components are not completed in EPIC by noon the day prior to surgery your case may be rescheduled. Thank you in advance for completing the record and improving [facility]'s quality of care.     Surgeon's Name:    Date of Surgery: 9/14/22   Procedure: Open Umbilical Hernia Repair with Mesh     Missing Components:H&P within 30 days and  Dr Real's note on 7/25 says \" Would recommend ultrasound CBC and INR prior to surgery\".               ----- Message -----   From: Helen Conner   Sent: 9/12/2022   8:54 AM CDT   To: Meagan Cortez   Subject: RE: H&P                                           Hi Dora,     I will see if our NP Lydia can see the patient tomorrow, otherwise we will have a fellow see him.     Thank you!   Helen   ----- Message -----   From: Meagan Sena "   Sent: 9/12/2022   8:40 AM CDT   To: Dora Riley   Subject: RE: H&P                                           Hi Dora,     Tobiasing in Helen here - Helen see below - do you know when the H&P is supposed to happen?     ST     ----- Message -----   From: Dora Artis   Sent: 9/10/2022  12:21 AM CDT   To: Meagan Sena   Subject: H&P                                               Hi Dr.Chinnakolta Meagan has this patient scheduled for 09/14 for a hernia repair, does your office/team have a H&P plan set up for this patient? Will  be able to update DOS from his visit from 07/25, looks like per his [employer] message he currently does not have a PCP in place and is unable to get an H&P done before his procedure.     Thank you,       Dora

## 2022-09-13 NOTE — ANESTHESIA PREPROCEDURE EVALUATION
Anesthesia Pre-Procedure Evaluation    Patient: Florian Nowak   MRN: [id number] : 1980        Procedure : Procedure(s):  Open Umbilical Hernia Repair with Mesh          Past Medical History:   Diagnosis Date     Diabetes (H)      History of blood transfusion      Hypertension       Past Surgical History:   Procedure Laterality Date     COLONOSCOPY N/A 2022    Procedure: COLONOSCOPY, WITH POLYPECTOMY AND CLIPS;  Surgeon: Cadence Murray MD;  Location: [facility] OR     ESOPHAGOSCOPY, GASTROSCOPY, DUODENOSCOPY (EGD), COMBINED N/A 2022    Procedure: ESOPHAGOGASTRODUODENOSCOPY, WITH BIOPSY;  Surgeon: Cadence Murray MD;  Location: [facility] OR     HERNIORRHAPHY UMBILICAL N/A 2021    Procedure: HERNIORRHAPHY, UMBILICAL, OPEN;  Surgeon: Costa Malave MD;  Location: [facility] OR      Allergies   Allergen Reactions     Bee Venom      Allergic to Bee Stings-swelling.     Ketorolac Tromethamine Other (See Comments)      Social History     Tobacco Use     Smoking status: Former Smoker     Smokeless tobacco: Never Used     Tobacco comment: [email or web address]   Substance Use Topics     Alcohol use: Not Currently     Comment: Quit ETOH 10/2020      Wt Readings from Last 1 Encounters:   22 111.9 kg (246 lb 9.6 oz)        Anesthesia Evaluation   Pt has had prior anesthetic. Type: General and MAC.    No history of anesthetic complications       ROS/MED HX  ENT/Pulmonary:     (+) Intermittent, asthma     Neurologic:  - neg neurologic ROS     Cardiovascular:     (+) Dyslipidemia hypertension-----Previous cardiac testing   Echo: Date: 21 Results:  Global and regional left ventricular function is normal with an EF of 60-65%. Right ventricular function, chamber size, wall motion, and thickness are normal.  Pulmonary artery systolic pressure is normal.  The inferior vena cava is normal.  No pericardial effusion is present.  Stress Test: Date: Results:    ECG Reviewed: Date: Results:    Cath: Date: Results:      METS/Exercise  Tolerance: >4 METS    Hematologic:  - neg hematologic  ROS   (+) anemia, history of blood transfusion, no previous transfusion reaction,     Musculoskeletal:  - neg musculoskeletal ROS     GI/Hepatic: Comment: Recurrent umbilical hernia s/p repair in 11/2021    (+) liver disease (S/p transplant 2013),     Renal/Genitourinary:  - neg Renal ROS   (+) renal disease, type: ARF,     Endo:     (+) type II DM, Last HgA1c: 5.4, date: 2021, Not using insulin, Obesity,  (-) Type I DM   Psychiatric/Substance Use:     (+) psychiatric history anxiety and depression alcohol abuse     Infectious Disease:  - neg infectious disease ROS     Malignancy:  - neg malignancy ROS     Other:  - neg other ROS          Physical Exam    Airway  airway exam normal      Mallampati: II   TM distance: > 3 FB   Neck ROM: full   Mouth opening: > 3 cm    Respiratory Devices and Support         Dental  no notable dental history         Cardiovascular   cardiovascular exam normal       Rhythm and rate: regular and normal     Pulmonary   pulmonary exam normal        breath sounds clear to auscultation           OUTSIDE LABS:  CBC:   Lab Results   Component Value Date    WBC 6.0 09/13/2022    WBC 5.0 06/01/2022    HGB 12.7 (L) 09/13/2022    HGB 12.6 (L) 06/01/2022    HCT 35.6 (L) 09/13/2022    HCT 36.9 (L) 06/01/2022     (L) 09/13/2022     (L) 06/01/2022     BMP:   Lab Results   Component Value Date     06/01/2022     03/18/2022    POTASSIUM 4.0 06/01/2022    POTASSIUM 3.7 03/18/2022    CHLORIDE 104 06/01/2022    CHLORIDE 104 03/18/2022    CO2 24 06/01/2022    CO2 26 03/18/2022    BUN 12 06/01/2022    BUN 17 03/18/2022    CR 0.84 06/01/2022    CR 0.92 03/18/2022    GLC 80 06/01/2022    GLC 89 03/18/2022     COAGS:   Lab Results   Component Value Date    PTT 36 11/11/2021    INR 1.33 (H) 03/18/2022     POC: No results found for: BGM, HCG, HCGS  HEPATIC:   Lab Results   Component Value Date    ALBUMIN 3.5 06/01/2022    PROTTOTAL  7.1 06/01/2022    ALT 34 06/01/2022    AST 48 (H) 06/01/2022    ALKPHOS 142 (H) 06/01/2022    BILITOTAL 2.0 (H) 06/01/2022     OTHER:   Lab Results   Component Value Date    LACT 2.4 (H) 11/11/2021    SANJANA 8.7 06/01/2022    LIPASE 237 11/11/2021       Anesthesia Plan    ASA Status:  3, emergent    NPO Status:  NPO Appropriate    Anesthesia Type: General.     - Airway: ETT   Induction: Intravenous.   Maintenance: Balanced.   Techniques and Equipment:     - Airway: Video-Laryngoscope     - Lines/Monitors: 2nd IV     Consents    Anesthesia Plan(s) and associated risks, benefits, and realistic alternatives discussed. Questions answered and patient/representative(s) expressed understanding.     - Discussed: Risks, Benefits and Alternatives for the PROCEDURE were discussed     - Discussed with:  Patient      - Extended Intubation/Ventilatory Support Discussed: No.      - Patient is DNR/DNI Status: No    Use of blood products discussed: No .     Postoperative Care    Pain management: IV analgesics, Oral pain medications, Multi-modal analgesia, Peripheral nerve block (Single Shot).   PONV prophylaxis: Ondansetron (or other 5HT-3), Dexamethasone or Solumedrol     Comments:                Jeremias Payton MD

## 2022-09-14 ENCOUNTER — ANCILLARY PROCEDURE (OUTPATIENT)
Dept: ULTRASOUND IMAGING | Facility: CLINIC | Age: 42
End: 2022-09-14
Attending: TRANSPLANT SURGERY
Payer: COMMERCIAL

## 2022-09-14 ENCOUNTER — HOSPITAL ENCOUNTER (OUTPATIENT)
Facility: CLINIC | Age: 42
Discharge: HOME | End: 2022-09-14
Attending: TRANSPLANT SURGERY | Admitting: TRANSPLANT SURGERY
Payer: COMMERCIAL

## 2022-09-14 ENCOUNTER — ANESTHESIA (OUTPATIENT)
Dept: SURGERY | Facility: CLINIC | Age: 42
End: 2022-09-14
Payer: COMMERCIAL

## 2022-09-14 VITALS
HEART RATE: 84 BPM | TEMPERATURE: 98.8 F | DIASTOLIC BLOOD PRESSURE: 80 MMHG | WEIGHT: 244.27 LBS | OXYGEN SATURATION: 97 % | HEIGHT: 72 IN | SYSTOLIC BLOOD PRESSURE: 131 MMHG | RESPIRATION RATE: 14 BRPM | BODY MASS INDEX: 33.09 KG/M2

## 2022-09-14 DIAGNOSIS — N17.9 AKI (ACUTE KIDNEY INJURY) (H): Primary | ICD-10-CM

## 2022-09-14 DIAGNOSIS — G89.18 ACUTE POST-OPERATIVE PAIN: ICD-10-CM

## 2022-09-14 DIAGNOSIS — Z11.59 ENCOUNTER FOR SCREENING FOR OTHER VIRAL DISEASES: ICD-10-CM

## 2022-09-14 DIAGNOSIS — K42.0 INCARCERATED UMBILICAL HERNIA: ICD-10-CM

## 2022-09-14 LAB
ABO/RH(D): NORMAL
ANTIBODY SCREEN: NEGATIVE
GLUCOSE BLDC GLUCOMTR-MCNC: 97 MG/DL (ref 70–99)
GLUCOSE BLDC GLUCOMTR-MCNC: 98 MG/DL (ref 70–99)
HOLD SPECIMEN: NORMAL
SARS-COV-2 RNA RESP QL NAA+PROBE: NEGATIVE
SPECIMEN EXPIRATION DATE: NORMAL

## 2022-09-14 PROCEDURE — 272N000001 HC OR GENERAL SUPPLY STERILE: Performed by: TRANSPLANT SURGERY

## 2022-09-14 PROCEDURE — C9290 INJ, BUPIVACAINE LIPOSOME: HCPCS | Performed by: ANESTHESIOLOGY

## 2022-09-14 PROCEDURE — 360N000075 HC SURGERY LEVEL 2, PER MIN: Performed by: TRANSPLANT SURGERY

## 2022-09-14 PROCEDURE — 250N000013 HC RX MED GY IP 250 OP 250 PS 637: Performed by: STUDENT IN AN ORGANIZED HEALTH CARE EDUCATION/TRAINING PROGRAM

## 2022-09-14 PROCEDURE — 999N000141 HC STATISTIC PRE-PROCEDURE NURSING ASSESSMENT: Performed by: TRANSPLANT SURGERY

## 2022-09-14 PROCEDURE — 250N000011 HC RX IP 250 OP 636: Performed by: STUDENT IN AN ORGANIZED HEALTH CARE EDUCATION/TRAINING PROGRAM

## 2022-09-14 PROCEDURE — 258N000003 HC RX IP 258 OP 636: Performed by: STUDENT IN AN ORGANIZED HEALTH CARE EDUCATION/TRAINING PROGRAM

## 2022-09-14 PROCEDURE — 86901 BLOOD TYPING SEROLOGIC RH(D): CPT | Performed by: STUDENT IN AN ORGANIZED HEALTH CARE EDUCATION/TRAINING PROGRAM

## 2022-09-14 PROCEDURE — 710N000010 HC RECOVERY PHASE 1, LEVEL 2, PER MIN: Performed by: TRANSPLANT SURGERY

## 2022-09-14 PROCEDURE — U0003 INFECTIOUS AGENT DETECTION BY NUCLEIC ACID (DNA OR RNA); SEVERE ACUTE RESPIRATORY SYNDROME CORONAVIRUS 2 (SARS-COV-2) (CORONAVIRUS DISEASE [COVID-19]), AMPLIFIED PROBE TECHNIQUE, MAKING USE OF HIGH THROUGHPUT TECHNOLOGIES AS DESCRIBED BY CMS-2020-01-R: HCPCS

## 2022-09-14 PROCEDURE — C1781 MESH (IMPLANTABLE): HCPCS | Performed by: TRANSPLANT SURGERY

## 2022-09-14 PROCEDURE — 76705 ECHO EXAM OF ABDOMEN: CPT | Performed by: STUDENT IN AN ORGANIZED HEALTH CARE EDUCATION/TRAINING PROGRAM

## 2022-09-14 PROCEDURE — 82962 GLUCOSE BLOOD TEST: CPT

## 2022-09-14 PROCEDURE — 258N000003 HC RX IP 258 OP 636: Performed by: NURSE PRACTITIONER

## 2022-09-14 PROCEDURE — 250N000011 HC RX IP 250 OP 636: Performed by: NURSE PRACTITIONER

## 2022-09-14 PROCEDURE — 250N000011 HC RX IP 250 OP 636: Performed by: ANESTHESIOLOGY

## 2022-09-14 PROCEDURE — 710N000012 HC RECOVERY PHASE 2, PER MINUTE: Performed by: TRANSPLANT SURGERY

## 2022-09-14 PROCEDURE — 250N000025 HC SEVOFLURANE, PER MIN: Performed by: TRANSPLANT SURGERY

## 2022-09-14 PROCEDURE — 250N000009 HC RX 250: Performed by: STUDENT IN AN ORGANIZED HEALTH CARE EDUCATION/TRAINING PROGRAM

## 2022-09-14 PROCEDURE — 49585 PR REPAIR UMBILICAL HERN,5+Y/O,REDUC: CPT | Performed by: TRANSPLANT SURGERY

## 2022-09-14 PROCEDURE — 370N000017 HC ANESTHESIA TECHNICAL FEE, PER MIN: Performed by: TRANSPLANT SURGERY

## 2022-09-14 PROCEDURE — 36415 COLL VENOUS BLD VENIPUNCTURE: CPT | Performed by: STUDENT IN AN ORGANIZED HEALTH CARE EDUCATION/TRAINING PROGRAM

## 2022-09-14 PROCEDURE — 86850 RBC ANTIBODY SCREEN: CPT | Performed by: STUDENT IN AN ORGANIZED HEALTH CARE EDUCATION/TRAINING PROGRAM

## 2022-09-14 DEVICE — PHASIX ST MESH, 10 CM X 15 CM (4" X 6"), RECTANGLE
Type: IMPLANTABLE DEVICE | Site: ABDOMEN | Status: FUNCTIONAL
Brand: PHASIX

## 2022-09-14 RX ORDER — BUPIVACAINE HYDROCHLORIDE 2.5 MG/ML
INJECTION, SOLUTION EPIDURAL; INFILTRATION; INTRACAUDAL
Status: COMPLETED | OUTPATIENT
Start: 2022-09-14 | End: 2022-09-14

## 2022-09-14 RX ORDER — FUROSEMIDE 10 MG/ML
INJECTION INTRAMUSCULAR; INTRAVENOUS PRN
Status: DISCONTINUED | OUTPATIENT
Start: 2022-09-14 | End: 2022-09-14

## 2022-09-14 RX ORDER — SODIUM CHLORIDE, SODIUM LACTATE, POTASSIUM CHLORIDE, CALCIUM CHLORIDE 600; 310; 30; 20 MG/100ML; MG/100ML; MG/100ML; MG/100ML
INJECTION, SOLUTION INTRAVENOUS CONTINUOUS
Status: DISCONTINUED | OUTPATIENT
Start: 2022-09-14 | End: 2022-09-14 | Stop reason: HOSPADM

## 2022-09-14 RX ORDER — ONDANSETRON 2 MG/ML
4 INJECTION INTRAMUSCULAR; INTRAVENOUS EVERY 30 MIN PRN
Status: DISCONTINUED | OUTPATIENT
Start: 2022-09-14 | End: 2022-09-14 | Stop reason: HOSPADM

## 2022-09-14 RX ORDER — OXYCODONE HYDROCHLORIDE 5 MG/1
5-10 TABLET ORAL EVERY 6 HOURS PRN
Qty: 12 TABLET | Refills: 0 | Status: SHIPPED | OUTPATIENT
Start: 2022-09-14 | End: 2022-09-19

## 2022-09-14 RX ORDER — FENTANYL CITRATE 50 UG/ML
25-50 INJECTION, SOLUTION INTRAMUSCULAR; INTRAVENOUS
Status: DISCONTINUED | OUTPATIENT
Start: 2022-09-14 | End: 2022-09-14 | Stop reason: HOSPADM

## 2022-09-14 RX ORDER — AMOXICILLIN 250 MG
1 CAPSULE ORAL 2 TIMES DAILY
Status: DISCONTINUED | OUTPATIENT
Start: 2022-09-14 | End: 2022-09-14 | Stop reason: HOSPADM

## 2022-09-14 RX ORDER — PROCHLORPERAZINE MALEATE 10 MG
10 TABLET ORAL EVERY 6 HOURS PRN
Status: DISCONTINUED | OUTPATIENT
Start: 2022-09-14 | End: 2022-09-14 | Stop reason: HOSPADM

## 2022-09-14 RX ORDER — LABETALOL HYDROCHLORIDE 5 MG/ML
10 INJECTION, SOLUTION INTRAVENOUS
Status: DISCONTINUED | OUTPATIENT
Start: 2022-09-14 | End: 2022-09-14 | Stop reason: HOSPADM

## 2022-09-14 RX ORDER — FENTANYL CITRATE 50 UG/ML
INJECTION, SOLUTION INTRAMUSCULAR; INTRAVENOUS PRN
Status: DISCONTINUED | OUTPATIENT
Start: 2022-09-14 | End: 2022-09-14

## 2022-09-14 RX ORDER — HYDROMORPHONE HYDROCHLORIDE 1 MG/ML
0.2 INJECTION, SOLUTION INTRAMUSCULAR; INTRAVENOUS; SUBCUTANEOUS EVERY 5 MIN PRN
Status: DISCONTINUED | OUTPATIENT
Start: 2022-09-14 | End: 2022-09-14 | Stop reason: HOSPADM

## 2022-09-14 RX ORDER — ONDANSETRON 2 MG/ML
INJECTION INTRAMUSCULAR; INTRAVENOUS PRN
Status: DISCONTINUED | OUTPATIENT
Start: 2022-09-14 | End: 2022-09-14

## 2022-09-14 RX ORDER — SODIUM CHLORIDE, SODIUM LACTATE, POTASSIUM CHLORIDE, CALCIUM CHLORIDE 600; 310; 30; 20 MG/100ML; MG/100ML; MG/100ML; MG/100ML
INJECTION, SOLUTION INTRAVENOUS CONTINUOUS PRN
Status: DISCONTINUED | OUTPATIENT
Start: 2022-09-14 | End: 2022-09-14

## 2022-09-14 RX ORDER — LIDOCAINE 40 MG/G
CREAM TOPICAL
Status: DISCONTINUED | OUTPATIENT
Start: 2022-09-14 | End: 2022-09-14 | Stop reason: HOSPADM

## 2022-09-14 RX ORDER — IBUPROFEN 600 MG/1
600 TABLET, FILM COATED ORAL EVERY 6 HOURS PRN
Status: DISCONTINUED | OUTPATIENT
Start: 2022-09-14 | End: 2022-09-14 | Stop reason: HOSPADM

## 2022-09-14 RX ORDER — FLUMAZENIL 0.1 MG/ML
0.2 INJECTION, SOLUTION INTRAVENOUS
Status: DISCONTINUED | OUTPATIENT
Start: 2022-09-14 | End: 2022-09-14 | Stop reason: HOSPADM

## 2022-09-14 RX ORDER — NALOXONE HYDROCHLORIDE 0.4 MG/ML
0.4 INJECTION, SOLUTION INTRAMUSCULAR; INTRAVENOUS; SUBCUTANEOUS
Status: DISCONTINUED | OUTPATIENT
Start: 2022-09-14 | End: 2022-09-14 | Stop reason: HOSPADM

## 2022-09-14 RX ORDER — ONDANSETRON 4 MG/1
4 TABLET, ORALLY DISINTEGRATING ORAL EVERY 30 MIN PRN
Status: DISCONTINUED | OUTPATIENT
Start: 2022-09-14 | End: 2022-09-14 | Stop reason: HOSPADM

## 2022-09-14 RX ORDER — ONDANSETRON 4 MG/1
4 TABLET, ORALLY DISINTEGRATING ORAL
Status: CANCELLED | OUTPATIENT
Start: 2022-09-14

## 2022-09-14 RX ORDER — ACETAMINOPHEN 325 MG/1
975 TABLET ORAL ONCE
Status: COMPLETED | OUTPATIENT
Start: 2022-09-14 | End: 2022-09-14

## 2022-09-14 RX ORDER — NALOXONE HYDROCHLORIDE 0.4 MG/ML
0.2 INJECTION, SOLUTION INTRAMUSCULAR; INTRAVENOUS; SUBCUTANEOUS
Status: DISCONTINUED | OUTPATIENT
Start: 2022-09-14 | End: 2022-09-14 | Stop reason: HOSPADM

## 2022-09-14 RX ORDER — CIPROFLOXACIN 500 MG/1
500 TABLET, FILM COATED ORAL 2 TIMES DAILY
Qty: 5 TABLET | Refills: 0 | Status: SHIPPED | OUTPATIENT
Start: 2022-09-14 | End: 2022-11-29

## 2022-09-14 RX ORDER — ONDANSETRON 4 MG/1
4 TABLET, ORALLY DISINTEGRATING ORAL EVERY 6 HOURS PRN
Status: DISCONTINUED | OUTPATIENT
Start: 2022-09-14 | End: 2022-09-14 | Stop reason: HOSPADM

## 2022-09-14 RX ORDER — PIPERACILLIN SODIUM, TAZOBACTAM SODIUM 3; .375 G/15ML; G/15ML
3.38 INJECTION, POWDER, LYOPHILIZED, FOR SOLUTION INTRAVENOUS ONCE
Status: COMPLETED | OUTPATIENT
Start: 2022-09-14 | End: 2022-09-14

## 2022-09-14 RX ORDER — PROPOFOL 10 MG/ML
INJECTION, EMULSION INTRAVENOUS PRN
Status: DISCONTINUED | OUTPATIENT
Start: 2022-09-14 | End: 2022-09-14

## 2022-09-14 RX ORDER — BISACODYL 10 MG
10 SUPPOSITORY, RECTAL RECTAL DAILY PRN
Status: DISCONTINUED | OUTPATIENT
Start: 2022-09-14 | End: 2022-09-14 | Stop reason: HOSPADM

## 2022-09-14 RX ORDER — FENTANYL CITRATE 50 UG/ML
25 INJECTION, SOLUTION INTRAMUSCULAR; INTRAVENOUS EVERY 5 MIN PRN
Status: DISCONTINUED | OUTPATIENT
Start: 2022-09-14 | End: 2022-09-14 | Stop reason: HOSPADM

## 2022-09-14 RX ORDER — LIDOCAINE HYDROCHLORIDE 20 MG/ML
INJECTION, SOLUTION INFILTRATION; PERINEURAL PRN
Status: DISCONTINUED | OUTPATIENT
Start: 2022-09-14 | End: 2022-09-14

## 2022-09-14 RX ORDER — HYDROXYZINE HYDROCHLORIDE 25 MG/1
25 TABLET, FILM COATED ORAL EVERY 6 HOURS PRN
Status: DISCONTINUED | OUTPATIENT
Start: 2022-09-14 | End: 2022-09-14 | Stop reason: HOSPADM

## 2022-09-14 RX ORDER — KETAMINE HYDROCHLORIDE 10 MG/ML
INJECTION INTRAMUSCULAR; INTRAVENOUS PRN
Status: DISCONTINUED | OUTPATIENT
Start: 2022-09-14 | End: 2022-09-14

## 2022-09-14 RX ORDER — ONDANSETRON 2 MG/ML
4 INJECTION INTRAMUSCULAR; INTRAVENOUS EVERY 6 HOURS PRN
Status: DISCONTINUED | OUTPATIENT
Start: 2022-09-14 | End: 2022-09-14 | Stop reason: HOSPADM

## 2022-09-14 RX ORDER — OXYCODONE HYDROCHLORIDE 5 MG/1
5 TABLET ORAL EVERY 4 HOURS PRN
Status: DISCONTINUED | OUTPATIENT
Start: 2022-09-14 | End: 2022-09-14 | Stop reason: HOSPADM

## 2022-09-14 RX ORDER — POLYETHYLENE GLYCOL 3350 17 G/17G
17 POWDER, FOR SOLUTION ORAL DAILY
Status: DISCONTINUED | OUTPATIENT
Start: 2022-09-15 | End: 2022-09-14 | Stop reason: HOSPADM

## 2022-09-14 RX ORDER — DEXAMETHASONE SODIUM PHOSPHATE 4 MG/ML
INJECTION, SOLUTION INTRA-ARTICULAR; INTRALESIONAL; INTRAMUSCULAR; INTRAVENOUS; SOFT TISSUE PRN
Status: DISCONTINUED | OUTPATIENT
Start: 2022-09-14 | End: 2022-09-14

## 2022-09-14 RX ADMIN — FENTANYL CITRATE 25 MCG: 50 INJECTION, SOLUTION INTRAMUSCULAR; INTRAVENOUS at 15:25

## 2022-09-14 RX ADMIN — ONDANSETRON 4 MG: 2 INJECTION INTRAMUSCULAR; INTRAVENOUS at 14:28

## 2022-09-14 RX ADMIN — FUROSEMIDE 20 MG: 10 INJECTION, SOLUTION INTRAVENOUS at 14:31

## 2022-09-14 RX ADMIN — PROPOFOL 200 MG: 10 INJECTION, EMULSION INTRAVENOUS at 12:59

## 2022-09-14 RX ADMIN — VANCOMYCIN HYDROCHLORIDE 1500 MG: 10 INJECTION, POWDER, LYOPHILIZED, FOR SOLUTION INTRAVENOUS at 11:17

## 2022-09-14 RX ADMIN — Medication 50 MG: at 12:59

## 2022-09-14 RX ADMIN — FENTANYL CITRATE 50 MCG: 50 INJECTION, SOLUTION INTRAMUSCULAR; INTRAVENOUS at 10:56

## 2022-09-14 RX ADMIN — PROPOFOL 20 MG: 10 INJECTION, EMULSION INTRAVENOUS at 14:44

## 2022-09-14 RX ADMIN — ONDANSETRON 4 MG: 2 INJECTION INTRAMUSCULAR; INTRAVENOUS at 15:24

## 2022-09-14 RX ADMIN — HYDROMORPHONE HYDROCHLORIDE 0.2 MG: 1 INJECTION, SOLUTION INTRAMUSCULAR; INTRAVENOUS; SUBCUTANEOUS at 15:45

## 2022-09-14 RX ADMIN — LIDOCAINE HYDROCHLORIDE 100 MG: 20 INJECTION, SOLUTION INFILTRATION; PERINEURAL at 12:59

## 2022-09-14 RX ADMIN — PIPERACILLIN AND TAZOBACTAM 3.38 G: 3; .375 INJECTION, POWDER, FOR SOLUTION INTRAVENOUS at 13:07

## 2022-09-14 RX ADMIN — IBUPROFEN 600 MG: 200 TABLET, FILM COATED ORAL at 15:26

## 2022-09-14 RX ADMIN — BUPIVACAINE HYDROCHLORIDE 20 ML: 2.5 INJECTION, SOLUTION EPIDURAL; INFILTRATION; INTRACAUDAL; PERINEURAL at 12:00

## 2022-09-14 RX ADMIN — PROPOFOL 20 MG: 10 INJECTION, EMULSION INTRAVENOUS at 14:34

## 2022-09-14 RX ADMIN — PROPOFOL 20 MG: 10 INJECTION, EMULSION INTRAVENOUS at 14:29

## 2022-09-14 RX ADMIN — FENTANYL CITRATE 100 MCG: 50 INJECTION, SOLUTION INTRAMUSCULAR; INTRAVENOUS at 13:16

## 2022-09-14 RX ADMIN — FENTANYL CITRATE 25 MCG: 50 INJECTION, SOLUTION INTRAMUSCULAR; INTRAVENOUS at 15:30

## 2022-09-14 RX ADMIN — MIDAZOLAM HYDROCHLORIDE 1 MG: 1 INJECTION, SOLUTION INTRAMUSCULAR; INTRAVENOUS at 10:56

## 2022-09-14 RX ADMIN — PROPOFOL 40 MG: 10 INJECTION, EMULSION INTRAVENOUS at 14:24

## 2022-09-14 RX ADMIN — DEXAMETHASONE SODIUM PHOSPHATE 8 MG: 4 INJECTION, SOLUTION INTRA-ARTICULAR; INTRALESIONAL; INTRAMUSCULAR; INTRAVENOUS; SOFT TISSUE at 13:16

## 2022-09-14 RX ADMIN — FENTANYL CITRATE 25 MCG: 50 INJECTION, SOLUTION INTRAMUSCULAR; INTRAVENOUS at 15:40

## 2022-09-14 RX ADMIN — BUPIVACAINE 20 ML: 13.3 INJECTION, SUSPENSION, LIPOSOMAL INFILTRATION at 12:00

## 2022-09-14 RX ADMIN — SODIUM CHLORIDE, POTASSIUM CHLORIDE, SODIUM LACTATE AND CALCIUM CHLORIDE: 600; 310; 30; 20 INJECTION, SOLUTION INTRAVENOUS at 12:59

## 2022-09-14 RX ADMIN — PROPOFOL 60 MG: 10 INJECTION, EMULSION INTRAVENOUS at 14:37

## 2022-09-14 RX ADMIN — ACETAMINOPHEN 975 MG: 325 TABLET, FILM COATED ORAL at 11:12

## 2022-09-14 RX ADMIN — OXYCODONE HYDROCHLORIDE 5 MG: 5 TABLET ORAL at 15:45

## 2022-09-14 RX ADMIN — PROPOFOL 20 MG: 10 INJECTION, EMULSION INTRAVENOUS at 14:41

## 2022-09-14 RX ADMIN — LIDOCAINE HYDROCHLORIDE 100 MG: 20 INJECTION, SOLUTION INFILTRATION; PERINEURAL at 14:50

## 2022-09-14 RX ADMIN — SUGAMMADEX 200 MG: 100 INJECTION, SOLUTION INTRAVENOUS at 14:36

## 2022-09-14 RX ADMIN — PROPOFOL 20 MG: 10 INJECTION, EMULSION INTRAVENOUS at 14:43

## 2022-09-14 RX ADMIN — FENTANYL CITRATE 25 MCG: 50 INJECTION, SOLUTION INTRAMUSCULAR; INTRAVENOUS at 15:35

## 2022-09-14 RX ADMIN — PROPOFOL 40 MG: 10 INJECTION, EMULSION INTRAVENOUS at 14:38

## 2022-09-14 ASSESSMENT — ACTIVITIES OF DAILY LIVING (ADL)
ADLS_ACUITY_SCORE: 35
ADLS_ACUITY_SCORE: 35
ADLS_ACUITY_SCORE: 33
ADLS_ACUITY_SCORE: 35
ADLS_ACUITY_SCORE: 35

## 2022-09-14 NOTE — ANESTHESIA PROCEDURE NOTES
Airway       Patient location during procedure: OR       Procedure Start/Stop Times: 9/14/2022 1:02 PM  Staff -        Resident/Fellow: Jeremias Payton MD       Performed By: resident  Consent for Airway        Urgency: elective  Indications and Patient Condition       Indications for airway management: maurice-procedural       Induction type:intravenous       Mask difficulty assessment: 2 - vent by mask + OA or adjuvant +/- NMBA    Final Airway Details       Final airway type: endotracheal airway       Successful airway: ETT - single  Endotracheal Airway Details        ETT size (mm): 7.5       Cuffed: yes       Successful intubation technique: video laryngoscopy       VL Blade Size: MAC 4       Grade View of Cords: 1       Adjucts: stylet       Position: Center       Measured from: gums/teeth       Secured at (cm): 22       Bite block used: None    Post intubation assessment        Placement verified by: capnometry, equal breath sounds and chest rise        Number of attempts at approach: 1       Number of other approaches attempted: 0       Secured with: silk tape       Ease of procedure: easy       Dentition: Intact    Medication(s) Administered   Medication Administration Time: 9/14/2022 1:02 PM

## 2022-09-14 NOTE — DISCHARGE INSTRUCTIONS
[facility]  Same-Day Surgery   Adult Discharge Orders & Instructions     For 24 hours after surgery    Get plenty of rest.  A responsible adult must stay with you for at least 24 hours after you leave the hospital.   Do not drive or use heavy equipment.  If you have weakness or tingling, don't drive or use heavy equipment until this feeling goes away.  Do not drink alcohol.  Avoid strenuous or risky activities.  Ask for help when climbing stairs.   You may feel lightheaded.  IF so, sit for a few minutes before standing.  Have someone help you get up.   If you have nausea (feel sick to your stomach): Drink only clear liquids such as apple juice, ginger ale, broth or 7-Up.  Rest may also help.  Be sure to drink enough fluids.  Move to a regular diet as you feel able.  You may have a slight fever. Call the doctor if your fever is over 100 F (37.7 C) (taken under the tongue) or lasts longer than 24 hours.  You may have a dry mouth, a sore throat, muscle aches or trouble sleeping.  These should go away after 24 hours.  Do not make important or legal decisions.   Call your doctor for any of the followin.  Signs of infection (fever, growing tenderness at the surgery site, a large amount of drainage or bleeding, severe pain, foul-smelling drainage, redness, swelling).    2. It has been over 8 to 10 hours since surgery and you are still not able to urinate (pass water).    3.  Headache for over 24 hours.    4.  Numbness, tingling or weakness the day after surgery (if you had spinal anesthesia).  To contact Dr Real's office, [phone] ([facility]) or:    '   [phone] and ask for the resident on call for   Transplant (answered 24 hours a day)  '   Emergency Department:    [facility]: [phone]

## 2022-09-14 NOTE — ANESTHESIA PROCEDURE NOTES
TAP Procedure Note    Pre-Procedure   Staff -        Anesthesiologist:  James Santos MD       Resident/Fellow: Jose Lazar MD       Performed By: resident       Location: pre-op       Pre-Anesthestic Checklist: patient identified, IV checked, site marked, risks and benefits discussed, informed consent, monitors and equipment checked, pre-op evaluation, at physician/surgeon's request and post-op pain management  Timeout:       Correct Patient: Yes        Correct Procedure: Yes        Correct Site: Yes        Correct Position: Yes        Correct Laterality: Yes        Site Marked: Yes  Procedure Documentation  Procedure: TAP       Diagnosis: POST OPERATIVE PAIN       Laterality: bilateral       Patient Position: supine       Patient Prep/Sterile Barriers: sterile gloves, mask       Skin prep: Chloraprep       Needle Type: short bevel       Needle Gauge: 21.        Needle Length (millimeters): 110        Ultrasound guided       1. Ultrasound was used to identify targeted nerve, plexus, vascular marker, or fascial plane and place a needle adjacent to it in real-time.       2. Ultrasound was used to visualize the spread of anesthetic in close proximity to the above referenced structure.       3. A permanent image is entered into the patient's record.    Assessment/Narrative         The placement was negative for: blood aspirated, painful injection and site bleeding       Paresthesias: No.       Bolus given via needle..        Secured via.        Insertion/Infusion Method: Single Shot       Complications: none       Injection made incrementally with aspirations every 5 mL.    Medication(s) Administered   Bupivacaine 0.25% PF (Infiltration) - Infiltration   20 mL - 9/14/2022 12:00:00 PM  Bupivacaine liposome (Exparel) 1.3% LA inj susp (Infiltration) - Infiltration   20 mL - 9/14/2022 12:00:00 PM

## 2022-09-14 NOTE — ANESTHESIA CARE TRANSFER NOTE
Patient: Florian Nowak    Procedure: Procedure(s):  Open Umbilical Hernia Repair with Mesh       Diagnosis: Hernia, umbilical [K42.9]  Diagnosis Additional Information: No value filed.    Anesthesia Type:   General     Note:    Oropharynx: oropharynx clear of all foreign objects and spontaneously breathing  Level of Consciousness: awake  Oxygen Supplementation: nasal cannula  Level of Supplemental Oxygen (L/min / FiO2): 4  Independent Airway: airway patency satisfactory and stable  Dentition: dentition unchanged  Vital Signs Stable: post-procedure vital signs reviewed and stable  Report to RN Given: handoff report given  Patient transferred to: PACU  Comments: Patient noted his pain was about 5-6 out of 10 in PACU. Told nurse, and has orders for pain medications  Handoff Report: Identifed the Patient, Identified the Reponsible Provider, Reviewed the pertinent medical history, Discussed the surgical course, Reviewed Intra-OP anesthesia mangement and issues during anesthesia, Set expectations for post-procedure period and Allowed opportunity for questions and acknowledgement of understanding      Vitals:  Vitals Value Taken Time   /82 09/14/22 1503   Temp     Pulse 80 09/14/22 1506   Resp     SpO2 98 % 09/14/22 1506   Vitals shown include unvalidated device data.    Electronically Signed By: Jeremias Payton MD  September 14, 2022  3:07 PM

## 2022-09-14 NOTE — OP NOTE
Procedure Date: 09/14/2022    PREOPERATIVE DIAGNOSES:     1.  Recurrent umbilical hernia.  2.  Cirrhosis of the liver.    POSTOPERATIVE DIAGNOSIS:  Recurrent umbilical hernia is cirrhosis of the liver.    SURGEON:  Burak Real MD        INDICATIONS FOR PROCEDURE:  Florian Nowak is a cirrhotic patient with a symptomatic recurrent umbilical hernia.  I met with him and explained to him the risks, benefits and alternatives of mesh repair of the hernia.  He understood the risks and consented for the procedure.    FINDINGS:    1.  A 4 x 5 cm defect in the umbilical hernia.  2.  The sac containing ascitic fluid.    DESCRIPTION OF PROCEDURE:  The patient was brought to the operating room, placed in supine position, placed under general anesthesia.  Parts were prepped from nipple to mid-thigh.  Sterile drapes were placed.  Antibiotics were given.  We opened the hernia site by using the previous transverse incision.  We dissected the sac. We found it in the subcutaneous tissue.  We opened the sac.  The sac did not contain any bowel, it just contained ascites.  We dissected the sac and excised it.  We brought in a 10 x 15 cm mesh and we placed it inlay and fixed it to the Parietex using Mario-Sarah and 0 Prolene.  We then closed the fascia transversely using #1 Prolene.  The subcutaneous tissue was approximated with 3-0 Vicryl.  We placed a subcutaneous drain.  We then put another additional layer in the subcutaneous tissue.  The skin was approximated with 4-0 Monocryl.  Blood loss was less than 25 mL. The patient tolerated the procedure well.  The drain was fixed to the skin using 3-0 nylon.  The patient tolerated the procedure well and was shifted to the PACU in stable, but critical condition and explained the details of the procedure to the family and answered all their questions.    Burak Real MD        D: 09/14/2022   T: 09/14/2022   MT: [id number]    Name:     FLORIAN NOWAK  MRN:       [id number]        Account:        [id number]   :      1980           Procedure Date: 2022     Document: [id number]

## 2022-09-14 NOTE — BRIEF OP NOTE
"[facility]    Brief Operative Note    Pre-operative diagnosis: Hernia, umbilical [K42.9]  Post-operative diagnosis Same as pre-operative diagnosis    Procedure: Procedure(s):  Open Umbilical Hernia Repair with Mesh  Surgeon: Surgeon(s) and Role:     * Burak Real MD - Primary     * Susanna Vicente MD  Anesthesia: General with Block   Estimated Blood Loss: Less than 50 ml    Drains: Ciro-Johnson  Specimens: * No specimens in log *  Findings:   recurrent umbilical hernia, please see dictated notes.  Complications: None.  Implants:   Implant Name Type Inv. Item Serial No.  Lot No. LRB No. Used Action   MESH PHASIX RECONSTRUCTIVE RESORB 4X6\" [id number] - [id number] Mesh MESH PHASIX RECONSTRUCTIVE RESORB 4X6\" [id number]  [employer] [facility] [id number] N/A 1 Implanted           "

## 2022-09-14 NOTE — H&P
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    MELD-Na: 9    Plan to proceed with umbilical hernia repair    Code Status   Full Code    Disposition Plan   Expected discharge: Today, recommended to prior living arrangement once stable    Primary Care Physician   None    Chief Complaint   Hernia    History is obtained from the patient    History of Present Illness   Florian Nowak is a 42 year old male with past medical history of alcohol use disorder, alcohol-related cirrhosis complicated by refractory ascites, spontaneous bacterial peritonitis, variceal bleeding, who underwent TIPS placement on September 22, 2021 who presents for a hernia repair.    He reports doing well without acute concerns.    Denies fevers, chills or weight loss. Denies chest pain or shortness of breath. Able to exercise (METS > 4) without chest pain, dyspnea on exertion.     The hernia has been causing him pain and disturbing his quality of life. Denies issues with significant constipation or diarrhea. History of umbilical hernia repair in the past; no mesh used. Denies pain medications for pain control.      Past Medical History    I have reviewed this patient's medical history and updated it with pertinent information if needed.   Past Medical History:   Diagnosis Date     Diabetes (H)      History of blood transfusion      Hypertension    - Asthma  - Cirrhosis  - HTN  - Alcohol use disoder    Past Surgical History   I have reviewed this patient's surgical history and updated it with pertinent information if needed.  Past Surgical History:   Procedure  Laterality Date     COLONOSCOPY N/A 4/28/2022    Procedure: COLONOSCOPY, WITH POLYPECTOMY AND CLIPS;  Surgeon: Cadence Murray MD;  Location: [facility] OR     ESOPHAGOSCOPY, GASTROSCOPY, DUODENOSCOPY (EGD), COMBINED N/A 4/28/2022    Procedure: ESOPHAGOGASTRODUODENOSCOPY, WITH BIOPSY;  Surgeon: Cadence Murray MD;  Location: [facility] OR     HERNIORRHAPHY UMBILICAL N/A 11/11/2021    Procedure: HERNIORRHAPHY, UMBILICAL, OPEN;  Surgeon: Costa Malave MD;  Location: [facility] OR      Prior to Admission Medications   Prior to Admission Medications   Prescriptions Last Dose Informant Patient Reported? Taking?   eplerenone (INSPRA) 25 MG tablet   No No   Sig: Take 1 tablet (25 mg) by mouth daily   ferrous sulfate (FEROSUL) 325 (65 Fe) MG tablet   Yes No   Sig: Take 325 mg by mouth daily (with breakfast)   furosemide (LASIX) 40 MG tablet   No No   Sig: Take 2 tablets (80 mg) by mouth 2 times daily   ondansetron (ZOFRAN) 4 MG tablet   No No   Sig: Take 1 tablet (4 mg) by mouth every 8 hours as needed for nausea      Facility-Administered Medications: None     Allergies   Allergies   Allergen Reactions     Bee Venom      Allergic to Bee Stings-swelling.     Ketorolac Tromethamine Other (See Comments)       Social History   I have reviewed this patient's social history and updated it with pertinent information if needed.  - Lives with daughter + wife  - Works with patients with mental health disorders  - Former tobacco use  - Former alcohol use, quit 2020    Family History   I have reviewed this patient's family history and updated it with pertinent information if needed.   - No family history of liver disease    Review of Systems   The 10 point Review of Systems is negative other than noted in the HPI or here. *    Physical Exam   /74 (BP Location: Left arm)   Pulse 74   Temp 99  F (37.2  C) (Oral)   Resp 14   Ht 1.829 m (6')   Wt 110.8 kg (244 lb 4.3 oz)   SpO2 99%   BMI 33.13 kg/m    Constitutional: NAD  Eyes: conjunctiva non  icteric  Respiratory: Normal respiratory excursion , breathing comfortably on ambient air  Abd: Non distended, umbilical hernia   Skin: No jaundice  Psychiatric: normal mood and orientation    Data   I personally reviewed  Results for orders placed or performed in visit on 09/13/22 (from the past 24 hour(s))   INR   Result Value Ref Range    INR 1.29 (H) 0.85 - 1.15   CBC with platelets and differential    Narrative    The following orders were created for panel order CBC with platelets and differential.  Procedure                               Abnormality         Status                     ---------                               -----------         ------                     CBC with platelets and d...[[id number]]  Abnormal            Final result                 Please view results for these tests on the individual orders.   CBC with platelets and differential   Result Value Ref Range    WBC Count 6.0 4.0 - 11.0 10e3/uL    RBC Count 3.95 (L) 4.40 - 5.90 10e6/uL    Hemoglobin 12.7 (L) 13.3 - 17.7 g/dL    Hematocrit 35.6 (L) 40.0 - 53.0 %    MCV 90 78 - 100 fL    MCH 32.2 26.5 - 33.0 pg    MCHC 35.7 31.5 - 36.5 g/dL    RDW 13.7 10.0 - 15.0 %    Platelet Count 133 (L) 150 - 450 10e3/uL    % Neutrophils 51 %    % Lymphocytes 36 %    % Monocytes 10 %    % Eosinophils 2 %    % Basophils 0 %    % Immature Granulocytes 0 %    Absolute Neutrophils 3.0 1.6 - 8.3 10e3/uL    Absolute Lymphocytes 2.2 0.8 - 5.3 10e3/uL    Absolute Monocytes 0.6 0.0 - 1.3 10e3/uL    Absolute Eosinophils 0.1 0.0 - 0.7 10e3/uL    Absolute Basophils 0.0 0.0 - 0.2 10e3/uL    Absolute Immature Granulocytes 0.0 <=0.4 10e3/uL

## 2022-09-14 NOTE — ANESTHESIA POSTPROCEDURE EVALUATION
Patient: Florian Nowak    Procedure: Procedure(s):  Open Umbilical Hernia Repair with Mesh       Anesthesia Type:  General    Note:  Disposition: Inpatient   Postop Pain Control: Uneventful            Sign Out: Well controlled pain   PONV: No   Neuro/Psych: Uneventful            Sign Out: Acceptable/Baseline neuro status   Airway/Respiratory: Uneventful            Sign Out: Acceptable/Baseline resp. status   CV/Hemodynamics: Uneventful            Sign Out: Acceptable CV status; No obvious hypovolemia; No obvious fluid overload   Other NRE: NONE   DID A NON-ROUTINE EVENT OCCUR? No           Last vitals:  Vitals Value Taken Time   /82 09/14/22 1610   Temp 36.6  C (97.9  F) 09/14/22 1500   Pulse 79 09/14/22 1613   Resp 10 09/14/22 1600   SpO2 95 % 09/14/22 1613   Vitals shown include unvalidated device data.    Electronically Signed By: Jose Lazar MD  September 14, 2022  4:14 PM

## 2022-09-14 NOTE — OR NURSING
TAP block done in preop with Dr. Santos and resident. Time out completed, VSS, O2 applied, and patient tolerated well with 50 of fent and versed 1mg via PIV

## 2022-09-16 DIAGNOSIS — Z87.19 H/O HERNIA REPAIR: Primary | ICD-10-CM

## 2022-09-16 DIAGNOSIS — Z98.890 H/O HERNIA REPAIR: Primary | ICD-10-CM

## 2022-09-19 ENCOUNTER — OFFICE VISIT (OUTPATIENT)
Dept: TRANSPLANT | Facility: CLINIC | Age: 42
End: 2022-09-19
Attending: TRANSPLANT SURGERY
Payer: COMMERCIAL

## 2022-09-19 VITALS
SYSTOLIC BLOOD PRESSURE: 121 MMHG | DIASTOLIC BLOOD PRESSURE: 78 MMHG | HEART RATE: 76 BPM | BODY MASS INDEX: 33.36 KG/M2 | OXYGEN SATURATION: 97 % | WEIGHT: 246 LBS

## 2022-09-19 DIAGNOSIS — Z98.890 H/O HERNIA REPAIR: ICD-10-CM

## 2022-09-19 DIAGNOSIS — Z87.19 H/O HERNIA REPAIR: ICD-10-CM

## 2022-09-19 PROCEDURE — 99024 POSTOP FOLLOW-UP VISIT: CPT | Performed by: TRANSPLANT SURGERY

## 2022-09-19 NOTE — LETTER
9/19/2022         RE: Florian Nowak  [address]  [place] [facility] [address]        Dear Colleague,    Thank you for referring your patient, Florian Nowak, to the [facility]. Please see a copy of my visit note below.      Postoperative wound     Patient is doing well, no fevers    Exam  Vital signs:      BP: 121/78 Pulse: 76     SpO2: 97 %       Weight: 111.6 kg (246 lb)  Estimated body mass index is 33.36 kg/m  as calculated from the following:    Height as of 9/14/22: 1.829 m (6').    Weight as of this encounter: 111.6 kg (246 lb).      Wound is healthy, drain removed      Post umbilical hernia repair  Cirrhosis of liver    Follow up with Dr. Cadenec Maza MD      Again, thank you for allowing me to participate in the care of your patient.      Sincerely,        Burak Real MD

## 2022-09-19 NOTE — NURSING NOTE
Chief Complaint   Patient presents with     Post-op Visit     HERNIA REPAIR     Blood pressure 121/78, pulse 76, weight 111.6 kg (246 lb), SpO2 97 %.    Janett Dunne CMA

## 2022-09-19 NOTE — PROGRESS NOTES
Postoperative wound     Patient is doing well, no fevers    Exam  Vital signs:      BP: 121/78 Pulse: 76     SpO2: 97 %       Weight: 111.6 kg (246 lb)  Estimated body mass index is 33.36 kg/m  as calculated from the following:    Height as of 9/14/22: 1.829 m (6').    Weight as of this encounter: 111.6 kg (246 lb).      Wound is healthy, drain removed      Post umbilical hernia repair  Cirrhosis of liver    Follow up with Dr. Cadence Maza MD

## 2022-09-21 DIAGNOSIS — K70.31 ALCOHOLIC CIRRHOSIS OF LIVER WITH ASCITES (H): ICD-10-CM

## 2022-09-21 RX ORDER — FUROSEMIDE 40 MG
80 TABLET ORAL 2 TIMES DAILY
Qty: 360 TABLET | Refills: 1 | Status: SHIPPED | OUTPATIENT
Start: 2022-09-21 | End: 2023-05-09

## 2022-09-27 ENCOUNTER — PATIENT OUTREACH (OUTPATIENT)
Dept: GASTROENTEROLOGY | Facility: CLINIC | Age: 42
End: 2022-09-27

## 2022-09-27 DIAGNOSIS — K70.31 ALCOHOLIC CIRRHOSIS OF LIVER WITH ASCITES (H): Primary | ICD-10-CM

## 2022-09-27 NOTE — PROGRESS NOTES
Pt is s/p umbilical hernia repair on 9/14. Pt had post op visit on 9/19 and abdominal drain removed. Pt denied abdominal pain and is no longer requiring pain medication. Pt is tolerating a regular diet and tolerating activity within post operative restrictions. Pt denied abdominal distension or lower extremity edema. Pt reported compliance with diuretics.     Pt has hepatology follow up on 11/29/22 with Dr. Murray. Will check in with pt in 6 weeks. Pt verbalized understanding and is agreeable to plan of care.

## 2022-11-01 DIAGNOSIS — H35.30 ARMD (AGE-RELATED MACULAR DEGENERATION), BILATERAL: Primary | ICD-10-CM

## 2022-11-09 ENCOUNTER — OFFICE VISIT (OUTPATIENT)
Dept: OPHTHALMOLOGY | Facility: CLINIC | Age: 42
End: 2022-11-09
Attending: OPHTHALMOLOGY
Payer: COMMERCIAL

## 2022-11-09 DIAGNOSIS — H35.3130 BILATERAL NONEXUDATIVE AGE-RELATED MACULAR DEGENERATION, UNSPECIFIED STAGE: Primary | ICD-10-CM

## 2022-11-09 DIAGNOSIS — H35.54 PATTERN DYSTROPHY OF MACULA: ICD-10-CM

## 2022-11-09 DIAGNOSIS — H35.30 ARMD (AGE-RELATED MACULAR DEGENERATION), BILATERAL: ICD-10-CM

## 2022-11-09 PROCEDURE — 92242 FLUORESCEIN&ICG ANGIOGRAPHY: CPT | Performed by: OPHTHALMOLOGY

## 2022-11-09 PROCEDURE — 99204 OFFICE O/P NEW MOD 45 MIN: CPT | Mod: GC | Performed by: OPHTHALMOLOGY

## 2022-11-09 PROCEDURE — 92134 CPTRZ OPH DX IMG PST SGM RTA: CPT | Performed by: OPHTHALMOLOGY

## 2022-11-09 PROCEDURE — 99207 FUNDUS AUTOFLUORESCENCE IMAGE (FAF) OU (BOTH EYES): CPT | Mod: 26 | Performed by: OPHTHALMOLOGY

## 2022-11-09 PROCEDURE — G0463 HOSPITAL OUTPT CLINIC VISIT: HCPCS | Mod: 25

## 2022-11-09 PROCEDURE — 92250 FUNDUS PHOTOGRAPHY W/I&R: CPT | Performed by: OPHTHALMOLOGY

## 2022-11-09 ASSESSMENT — VISUAL ACUITY
METHOD: SNELLEN - LINEAR
OD_SC: 20/25
OS_SC: 20/25
OS_SC+: +2

## 2022-11-09 ASSESSMENT — CONF VISUAL FIELD
OD_NORMAL: 1
METHOD: COUNTING FINGERS
OS_INFERIOR_NASAL_RESTRICTION: 0
OS_NORMAL: 1
OS_SUPERIOR_NASAL_RESTRICTION: 0
OD_INFERIOR_TEMPORAL_RESTRICTION: 0
OS_INFERIOR_TEMPORAL_RESTRICTION: 0
OS_SUPERIOR_TEMPORAL_RESTRICTION: 0
OD_INFERIOR_NASAL_RESTRICTION: 0
OD_SUPERIOR_TEMPORAL_RESTRICTION: 0
OD_SUPERIOR_NASAL_RESTRICTION: 0

## 2022-11-09 ASSESSMENT — CUP TO DISC RATIO
OD_RATIO: 0.1
OS_RATIO: 0.1

## 2022-11-09 ASSESSMENT — EXTERNAL EXAM - RIGHT EYE: OD_EXAM: NORMAL

## 2022-11-09 ASSESSMENT — SLIT LAMP EXAM - LIDS: COMMENTS: NORMAL

## 2022-11-09 ASSESSMENT — TONOMETRY
OD_IOP_MMHG: 18
IOP_METHOD: TONOPEN
OS_IOP_MMHG: 16

## 2022-11-09 ASSESSMENT — EXTERNAL EXAM - LEFT EYE: OS_EXAM: NORMAL

## 2022-11-09 NOTE — PROGRESS NOTES
CC -   Retinal evaluation    INTERVAL HISTORY - Initial visit    HPI -   Florian Nowak is a  42 year old year-old patient presenting for retinal consultation regarding dystrophy. For the past 7 years has been told by optometrist that he should be evaluated by ophthalmologist. Seen at [facility] in [place] 6 months ago and had extensive work up (including FA). Diagnosed with AMD. Recommended h1nbypd exams. Here for y2yllhh exam and 2nd opinion.     He reports he is visually asymptomatic. No distortion of the vision, no flashes, no floaters. No distortion on amsler grid.    PMHx: Cirrhosis (from alcohol use), portal hypertension s/p TIPS (1 yr ago), HTN,   PSHx: TIPS, Hernia repair   FHx: MGM with glaucoma, no h/o macular degeneration, no h/o retinal dystrophies  Meds: Furosemide, eplerenone (6months ago), AREDS 2 (6 months ago)    PAST OCULAR SURGERY  LASIK (2-3 years ago by Dr. Espino) - near sighted per history     RETINAL IMAGING:  OCT 11/09/22   OD - temporal drusen, subretinal coalescing hyperreflective deposits, no subretinal fluid or intraretinal fluid, PHF attached. Not subfoveal  OS - temporal drusen, subretinal coalescing hyperreflective deposits, no subretinal fluid or intraretinal fluid, PHF attached. Not subfoveal    AF 11/09/22   right eye - mixed hyper/hypoautofluorescence stippled pattern with more hypofluoresence closer to fovea and predominantly hyperautofluoresence more peripherally   left eye - mixed hyper/hypoautofluorescence stippled pattern with more hypofluoresence closer to fovea and predominantly hyperautofluoresence more peripherally     Optos 11/09/22   right eye - area of hypopigmentation temporal to fovea otherwise normal   left eye - area of hypopigmentation temporal to fovea otherwise normal     FA/ICGA 11/9/2  each eye - CNVM absent; lesions localized on temporal hypovascular choroid    ASSESSMENT & PLAN    #Pattern dystrophy, each eye   - differential includes a variation of  Torpedo maculopathy vs malatia levintense though unlikely vs reticular dystrophy  - no history of nyctalopia  - Patient asymptomatic, denies any history of other supplements  - no indication to take AREDS2 vitamin - hepatologist did clear him for taking this given the concern for large amount of zinc/copper in these vitamins. Okay to stop.  - Patient is non smoker  - Encouraged him to use amsler grid [id number] to check for distortion and to call if new symptoms start    #h/o high myopia  - s/p LASIK (pre lasik prescription unknown)     return to clinic: 6-12 months VTD, [place] FAF, OCT [place]     Asya Peraza MD  PGY3 - Ophthalmology      Complete documentation of historical and exam elements from today's encounter can be found in the full encounter summary report (not reduplicated in this progress note). I personally obtained the chief complaint(s) and history of present illness.  I confirmed and edited as necessary the review of systems, past medical/surgical history, family history, social history, and examination findings as documented by others; and I examined the patient myself. I personally reviewed the relevant tests, images, and reports as documented above. I formulated and edited as necessary the assessment and plan and discussed the findings and management plan with the patient and family.     Juan Antonio Tobar MD, PhD

## 2022-11-09 NOTE — NURSING NOTE
Chief Complaints and History of Present Illnesses   Patient presents with     Macular Degeneration Evaluation     Chief Complaint(s) and History of Present Illness(es)     Macular Degeneration Evaluation           Comments    Pt states vision has been good both distance and near. Pt using OTC readers for up close.  No eye pain today. No flashes or floaters. Redness and dryness in BE, relief with drops.    DONALD Nelson November 9, 2022 8:23 AM

## 2022-11-20 ENCOUNTER — HEALTH MAINTENANCE LETTER (OUTPATIENT)
Age: 42
End: 2022-11-20

## 2022-11-29 ENCOUNTER — LAB (OUTPATIENT)
Dept: LAB | Facility: CLINIC | Age: 42
End: 2022-11-29
Payer: COMMERCIAL

## 2022-11-29 ENCOUNTER — OFFICE VISIT (OUTPATIENT)
Dept: GASTROENTEROLOGY | Facility: CLINIC | Age: 42
End: 2022-11-29
Attending: STUDENT IN AN ORGANIZED HEALTH CARE EDUCATION/TRAINING PROGRAM
Payer: COMMERCIAL

## 2022-11-29 VITALS
BODY MASS INDEX: 33.9 KG/M2 | HEIGHT: 72 IN | SYSTOLIC BLOOD PRESSURE: 121 MMHG | DIASTOLIC BLOOD PRESSURE: 73 MMHG | HEART RATE: 81 BPM | WEIGHT: 250.3 LBS

## 2022-11-29 DIAGNOSIS — K70.31 ALCOHOLIC CIRRHOSIS OF LIVER WITH ASCITES (H): Primary | ICD-10-CM

## 2022-11-29 DIAGNOSIS — Z95.828 S/P TIPS (TRANSJUGULAR INTRAHEPATIC PORTOSYSTEMIC SHUNT): ICD-10-CM

## 2022-11-29 DIAGNOSIS — K70.31 ALCOHOLIC CIRRHOSIS OF LIVER WITH ASCITES (H): ICD-10-CM

## 2022-11-29 DIAGNOSIS — R79.89 ELEVATED LFTS: ICD-10-CM

## 2022-11-29 DIAGNOSIS — D62 ACUTE BLOOD LOSS ANEMIA: ICD-10-CM

## 2022-11-29 LAB
AFP SERPL-MCNC: 2.4 NG/ML
ALBUMIN SERPL BCG-MCNC: 3.8 G/DL (ref 3.5–5.2)
ALP SERPL-CCNC: 118 U/L (ref 40–129)
ALT SERPL W P-5'-P-CCNC: 40 U/L (ref 10–50)
ANION GAP SERPL CALCULATED.3IONS-SCNC: 10 MMOL/L (ref 7–15)
AST SERPL W P-5'-P-CCNC: 57 U/L (ref 10–50)
BILIRUB DIRECT SERPL-MCNC: 0.44 MG/DL (ref 0–0.3)
BILIRUB SERPL-MCNC: 1.7 MG/DL
BUN SERPL-MCNC: 13.5 MG/DL (ref 6–20)
CALCIUM SERPL-MCNC: 9.3 MG/DL (ref 8.6–10)
CHLORIDE SERPL-SCNC: 105 MMOL/L (ref 98–107)
CREAT SERPL-MCNC: 0.93 MG/DL (ref 0.67–1.17)
DEPRECATED HCO3 PLAS-SCNC: 26 MMOL/L (ref 22–29)
ERYTHROCYTE [DISTWIDTH] IN BLOOD BY AUTOMATED COUNT: 13.9 % (ref 10–15)
GFR SERPL CREATININE-BSD FRML MDRD: >90 ML/MIN/1.73M2
GLUCOSE SERPL-MCNC: 107 MG/DL (ref 70–99)
HCT VFR BLD AUTO: 37 % (ref 40–53)
HGB BLD-MCNC: 13.1 G/DL (ref 13.3–17.7)
INR PPP: 1.19 (ref 0.85–1.15)
LDH SERPL L TO P-CCNC: 247 U/L (ref 0–250)
MCH RBC QN AUTO: 31.5 PG (ref 26.5–33)
MCHC RBC AUTO-ENTMCNC: 35.4 G/DL (ref 31.5–36.5)
MCV RBC AUTO: 89 FL (ref 78–100)
PLATELET # BLD AUTO: 111 10E3/UL (ref 150–450)
POTASSIUM SERPL-SCNC: 3.9 MMOL/L (ref 3.4–5.3)
PROT SERPL-MCNC: 7 G/DL (ref 6.4–8.3)
RBC # BLD AUTO: 4.16 10E6/UL (ref 4.4–5.9)
RETICS # AUTO: 0.1 10E6/UL (ref 0.03–0.1)
RETICS/RBC NFR AUTO: 2.4 % (ref 0.5–2)
SODIUM SERPL-SCNC: 141 MMOL/L (ref 136–145)
WBC # BLD AUTO: 4.9 10E3/UL (ref 4–11)

## 2022-11-29 PROCEDURE — 82248 BILIRUBIN DIRECT: CPT | Performed by: PATHOLOGY

## 2022-11-29 PROCEDURE — G0463 HOSPITAL OUTPT CLINIC VISIT: HCPCS

## 2022-11-29 PROCEDURE — 85027 COMPLETE CBC AUTOMATED: CPT | Performed by: PATHOLOGY

## 2022-11-29 PROCEDURE — 80053 COMPREHEN METABOLIC PANEL: CPT | Performed by: PATHOLOGY

## 2022-11-29 PROCEDURE — 83615 LACTATE (LD) (LDH) ENZYME: CPT | Performed by: PATHOLOGY

## 2022-11-29 PROCEDURE — 99214 OFFICE O/P EST MOD 30 MIN: CPT | Mod: 24 | Performed by: STUDENT IN AN ORGANIZED HEALTH CARE EDUCATION/TRAINING PROGRAM

## 2022-11-29 PROCEDURE — 82105 ALPHA-FETOPROTEIN SERUM: CPT | Performed by: PATHOLOGY

## 2022-11-29 PROCEDURE — 85045 AUTOMATED RETICULOCYTE COUNT: CPT | Performed by: PATHOLOGY

## 2022-11-29 PROCEDURE — 99000 SPECIMEN HANDLING OFFICE-LAB: CPT | Performed by: PATHOLOGY

## 2022-11-29 PROCEDURE — 36415 COLL VENOUS BLD VENIPUNCTURE: CPT | Performed by: PATHOLOGY

## 2022-11-29 PROCEDURE — 85610 PROTHROMBIN TIME: CPT | Performed by: PATHOLOGY

## 2022-11-29 ASSESSMENT — PAIN SCALES - GENERAL: PAINLEVEL: NO PAIN (0)

## 2022-11-29 NOTE — NURSING NOTE
Chief Complaint   Patient presents with     RECHECK     Follow up with cirrhosis     /73   Pulse 81   Ht 1.829 m (6')   Wt 113.5 kg (250 lb 4.8 oz)   BMI 33.95 kg/m    Evangelina Alonso CMA on 11/29/2022 at 10:37 AM

## 2022-11-29 NOTE — LETTER
11/29/2022         RE: Florian Nowak  [address]  [place] [facility] [address]        Dear Colleague,    Thank you for referring your patient, Florian Nowak, to the [facility] [place]. Please see a copy of my visit note below.    [facility] Return Patient Visit    Date of Visit: 11/29/2022    Reason for referral: Alcohol related liver disease    Subjective: Mr. Nowak is a 42 year old man with a history of AUD, alcohol related cirrhosis c/b refractory ascites, SBP, variceal bleeding, s/p TIPS 9/22/2021, who presents for routine follow up     Interval Events:  - Underwent umbilical hernia repair 9/14, did well with this  - Taking lasix 80 mg BID, eplerenone 25 mg daily - doing well with this but will noticed some headaches with diuretics if he is dehydrated  - Has been taking iron pills    ROS: 14 point ROS negative except for positives noted in HPI.    PMHx:  - AUD  - Alcohol related cirrhosis c/b refractory ascites/SBP, variceal bleeding  - Asthma  - HTN  - Macular degeneratio    PSHx:  - Back surgery microdisectomy  - Umbilical hernia repair    FamHx:  No family history of liver disease, liver cancer    SocHx:  Social History     Socioeconomic History     Marital status:      Spouse name: Not on file     Number of children: Not on file     Years of education: Not on file     Highest education level: Not on file   Occupational History     Not on file   Tobacco Use     Smoking status: Former     Smokeless tobacco: Never     Tobacco comments:     College   Substance and Sexual Activity     Alcohol use: Not Currently     Comment: Quit ETOH 10/2020     Drug use: Not Currently     Sexual activity: Not on file   Other Topics Concern     Parent/sibling w/ CABG, MI or angioplasty before 65F 55M? 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   No NSAIDs    Allergies:  Allergies   Allergen Reactions     Bee Venom      Allergic to Bee Stings-swelling.     Ketorolac Tromethamine Other (See Comments)       Objective:  /73   Pulse 81   Ht 1.829 m (6')   Wt 113.5 kg (250 lb 4.8 oz)   BMI 33.95 kg/m    Constitutional: pleasant man in NAD  Eyes: non icteric  Respiratory: Normal respiratory excursion   Abd: Non distended  Skin: No jaundice  Psychiatric: normal mood and orientation    Labs:  Last Comprehensive Metabolic Panel:  Sodium   Date Value Ref Range Status   11/29/2022 141 136 - 145 mmol/L Final     Potassium   Date Value Ref Range Status   11/29/2022 3.9 3.4 - 5.3 mmol/L Final   06/01/2022 4.0 3.5 - 5.0 mmol/L Final     Chloride   Date Value Ref Range Status   11/29/2022 105 98 - 107 mmol/L Final   06/01/2022 104 98 - 107 mmol/L Final     Carbon Dioxide (CO2)   Date Value Ref Range Status   11/29/2022 26 22 - 29 mmol/L Final   06/01/2022 24 22 - 31 mmol/L Final     Anion Gap   Date Value Ref Range Status   11/29/2022 10 7 - 15 mmol/L Final   06/01/2022 11 5 - 18 mmol/L Final     Glucose   Date Value Ref Range Status   11/29/2022 107 (H) 70 - 99 mg/dL Final   06/01/2022 80 70 - 125 mg/dL Final     GLUCOSE BY METER POCT   Date Value Ref Range Status   09/14/2022 97 70 - 99 mg/dL Final     Urea Nitrogen   Date Value Ref Range Status   11/29/2022 13.5  6.0 - 20.0 mg/dL Final   06/01/2022 12 8 - 22 mg/dL Final     Creatinine   Date Value Ref Range Status   11/29/2022 0.93 0.67 - 1.17 mg/dL Final     GFR Estimate   Date Value Ref Range Status   11/29/2022 >90 >60 mL/min/1.73m2 Final     Comment:     Effective December 21, 2021 eGFRcr in adults is calculated using the 2021 CKD-EPI creatinine equation which includes age and gender (Brijesh et al., [facility], DOI: 10.[facility]6/FHVRnj7759645)     Calcium   Date Value Ref Range Status   11/29/2022 9.3 8.6 - 10.0 mg/dL Final     Bilirubin Total   Date Value Ref Range Status   11/29/2022 1.7 (H) <=1.2 mg/dL Final     Alkaline Phosphatase   Date Value Ref Range Status   11/29/2022 118 40 - 129 U/L Final     ALT   Date Value Ref Range Status   11/29/2022 40 10 - 50 U/L Final     AST   Date Value Ref Range Status   11/29/2022 57 (H) 10 - 50 U/L Final       Lab Results   Component Value Date    WBC 8.5 08/18/2021     Lab Results   Component Value Date    RBC 3.26 08/18/2021     Lab Results   Component Value Date    HGB 9.7 08/18/2021     Lab Results   Component Value Date    HCT 29.0 08/18/2021     Lab Results   Component Value Date    MCV 89 08/18/2021     Lab Results   Component Value Date    MCH 29.8 08/18/2021     Lab Results   Component Value Date    MCHC 33.4 08/18/2021     Lab Results   Component Value Date    RDW 14.2 08/18/2021     Lab Results   Component Value Date     08/18/2021       INR   Date Value Ref Range Status   11/29/2022 1.19 (H) 0.85 - 1.15 Final     Last Comprehensive Metabolic Panel:  Sodium   Date Value Ref Range Status   11/29/2022 141 136 - 145 mmol/L Final     Potassium   Date Value Ref Range Status   11/29/2022 3.9 3.4 - 5.3 mmol/L Final   06/01/2022 4.0 3.5 - 5.0 mmol/L Final     Chloride   Date Value Ref Range Status   11/29/2022 105 98 - 107 mmol/L Final   06/01/2022 104 98 - 107 mmol/L Final     Carbon Dioxide (CO2)   Date Value Ref Range Status   11/29/2022 26 22 - 29 mmol/L Final   06/01/2022 24  22 - 31 mmol/L Final     Anion Gap   Date Value Ref Range Status   11/29/2022 10 7 - 15 mmol/L Final   06/01/2022 11 5 - 18 mmol/L Final     Glucose   Date Value Ref Range Status   11/29/2022 107 (H) 70 - 99 mg/dL Final   06/01/2022 80 70 - 125 mg/dL Final     GLUCOSE BY METER POCT   Date Value Ref Range Status   09/14/2022 97 70 - 99 mg/dL Final     Urea Nitrogen   Date Value Ref Range Status   11/29/2022 13.5 6.0 - 20.0 mg/dL Final   06/01/2022 12 8 - 22 mg/dL Final     Creatinine   Date Value Ref Range Status   11/29/2022 0.93 0.67 - 1.17 mg/dL Final     GFR Estimate   Date Value Ref Range Status   11/29/2022 >90 >60 mL/min/1.73m2 Final     Comment:     Effective December 21, 2021 eGFRcr in adults is calculated using the 2021 CKD-EPI creatinine equation which includes age and gender (Brijesh et al., [facility], DOI: 10.1056/OGNDsk2318489)     Calcium   Date Value Ref Range Status   11/29/2022 9.3 8.6 - 10.0 mg/dL Final     Bilirubin Total   Date Value Ref Range Status   11/29/2022 1.7 (H) <=1.2 mg/dL Final     Alkaline Phosphatase   Date Value Ref Range Status   11/29/2022 118 40 - 129 U/L Final     ALT   Date Value Ref Range Status   11/29/2022 40 10 - 50 U/L Final     AST   Date Value Ref Range Status   11/29/2022 57 (H) 10 - 50 U/L Final             Lab Results   Component Value Date    WBC 8.3 09/10/2021     Lab Results   Component Value Date    RBC 2.64 09/10/2021     Lab Results   Component Value Date    HGB 7.9 09/10/2021     Lab Results   Component Value Date    HCT 24.3 09/10/2021     No components found for: MCT  Lab Results   Component Value Date    MCV 92 09/10/2021     Lab Results   Component Value Date    MCH 29.9 09/10/2021     Lab Results   Component Value Date    MCHC 32.5 09/10/2021     Lab Results   Component Value Date    RDW 14.4 09/10/2021     Lab Results   Component Value Date     09/10/2021     MELD-Na score: 10 at 11/29/2022 10:17 AM  MELD score: 10 at 11/29/2022 10:17 AM  Calculated  from:  Serum Creatinine: 0.93 mg/dL (Using min of 1 mg/dL) at 11/29/2022 10:17 AM  Serum Sodium: 141 mmol/L (Using max of 137 mmol/L) at 11/29/2022 10:17 AM  Total Bilirubin: 1.7 mg/dL at 11/29/2022 10:17 AM  INR(ratio): 1.19 at 11/29/2022 10:17 AM  Age: 42 years    Imaging: Reviewed in EHR    Endoscopy: Reviewed in EHR    Independently reviewed labs and imaging.      Assessment/Plan: Mr. Nowak is a 42 year old man with a history of AUD, alcohol related cirrhosis c/b refractory ascites/SBP, recurrent variceal bleeding, now s/p TIPS 9/2021    Ascites improved post TIPS, but had issues with umbilical hernia after his TIPS and on 11/11 was unable to reduce and required surgical repair. Underwent recurrent umbilical hernia repair 9/2022, doing well after this    -Lasix 80 mg twice a day, Eplerenone 25 mg daily - can try decreasing lasix to daily 1/2023. Stopped cipro given only trace ascites  -Continue low sodium diet  -Discussed complete sobriety from alcohol, including NA beers  - HCC screening with AFP and RUQ US q6months, TIPS US every 6 months  -Ok to stop iron given hgb improved    Orders Placed This Encounter   Procedures     US Abdomen Limited with TIPSS Doppler     Comprehensive metabolic panel (BMP + Alb, Alk Phos, ALT, AST, Total. Bili, TP)     CBC with platelets     INR     AFP tumor marker     AFP tumor marker     RTC 6 months with RUQ US    Cadence Murray MD MS  Hepatology/Liver Transplant  [facility]

## 2022-11-29 NOTE — PROGRESS NOTES
[facility] Return Patient Visit    Date of Visit: 11/29/2022    Reason for referral: Alcohol related liver disease    Subjective: Mr. Nowak is a 42 year old man with a history of AUD, alcohol related cirrhosis c/b refractory ascites, SBP, variceal bleeding, s/p TIPS 9/22/2021, who presents for routine follow up     Interval Events:  - Underwent umbilical hernia repair 9/14, did well with this  - Taking lasix 80 mg BID, eplerenone 25 mg daily - doing well with this but will noticed some headaches with diuretics if he is dehydrated  - Has been taking iron pills    ROS: 14 point ROS negative except for positives noted in HPI.    PMHx:  - AUD  - Alcohol related cirrhosis c/b refractory ascites/SBP, variceal bleeding  - Asthma  - HTN  - Macular degeneratio    PSHx:  - Back surgery microdisectomy  - Umbilical hernia repair    FamHx:  No family history of liver disease, liver cancer    SocHx:  Social History     Socioeconomic History     Marital status:      Spouse name: Not on file     Number of children: Not on file     Years of education: Not on file     Highest education level: Not on file   Occupational History     Not on file   Tobacco Use     Smoking status: Former     Smokeless tobacco: Never     Tobacco comments:     College   Substance and Sexual Activity     Alcohol use: Not Currently     Comment: Quit ETOH 10/2020     Drug use: Not Currently     Sexual activity: Not on file   Other Topics Concern     Parent/sibling w/ CABG, MI or angioplasty before 65F 55M? 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   No NSAIDs    Allergies:  Allergies   Allergen Reactions     Bee Venom      Allergic to Bee Stings-swelling.     Ketorolac Tromethamine Other (See Comments)       Objective:  /73   Pulse 81   Ht 1.829 m (6')   Wt 113.5 kg (250 lb 4.8 oz)   BMI 33.95 kg/m    Constitutional: pleasant man in NAD  Eyes: non icteric  Respiratory: Normal respiratory excursion   Abd: Non distended  Skin: No jaundice  Psychiatric: normal mood and orientation    Labs:  Last Comprehensive Metabolic Panel:  Sodium   Date Value Ref Range Status   11/29/2022 141 136 - 145 mmol/L Final     Potassium   Date Value Ref Range Status   11/29/2022 3.9 3.4 - 5.3 mmol/L Final   06/01/2022 4.0 3.5 - 5.0 mmol/L Final     Chloride   Date Value Ref Range Status   11/29/2022 105 98 - 107 mmol/L Final   06/01/2022 104 98 - 107 mmol/L Final     Carbon Dioxide (CO2)   Date Value Ref Range Status   11/29/2022 26 22 - 29 mmol/L Final   06/01/2022 24 22 - 31 mmol/L Final     Anion Gap   Date Value Ref Range Status   11/29/2022 10 7 - 15 mmol/L Final   06/01/2022 11 5 - 18 mmol/L Final     Glucose   Date Value Ref Range Status   11/29/2022 107 (H) 70 - 99 mg/dL Final   06/01/2022 80 70 - 125 mg/dL Final     GLUCOSE BY METER POCT   Date Value Ref Range Status   09/14/2022 97 70 - 99 mg/dL Final     Urea Nitrogen   Date Value Ref Range Status   11/29/2022 13.5 6.0 - 20.0 mg/dL Final   06/01/2022 12 8 - 22 mg/dL Final     Creatinine   Date Value Ref Range Status   11/29/2022 0.93 0.67 - 1.17 mg/dL Final     GFR Estimate   Date Value Ref Range Status   11/29/2022 >90 >60 mL/min/1.73m2 Final     Comment:     Effective December 21, 2021 eGFRcr  in adults is calculated using the 2021 CKD-EPI creatinine equation which includes age and gender (Brijesh et al., NE, DOI: 10.1056/LCMTdr3641105)     Calcium   Date Value Ref Range Status   11/29/2022 9.3 8.6 - 10.0 mg/dL Final     Bilirubin Total   Date Value Ref Range Status   11/29/2022 1.7 (H) <=1.2 mg/dL Final     Alkaline Phosphatase   Date Value Ref Range Status   11/29/2022 118 40 - 129 U/L Final     ALT   Date Value Ref Range Status   11/29/2022 40 10 - 50 U/L Final     AST   Date Value Ref Range Status   11/29/2022 57 (H) 10 - 50 U/L Final       Lab Results   Component Value Date    WBC 8.5 08/18/2021     Lab Results   Component Value Date    RBC 3.26 08/18/2021     Lab Results   Component Value Date    HGB 9.7 08/18/2021     Lab Results   Component Value Date    HCT 29.0 08/18/2021     Lab Results   Component Value Date    MCV 89 08/18/2021     Lab Results   Component Value Date    MCH 29.8 08/18/2021     Lab Results   Component Value Date    MCHC 33.4 08/18/2021     Lab Results   Component Value Date    RDW 14.2 08/18/2021     Lab Results   Component Value Date     08/18/2021       INR   Date Value Ref Range Status   11/29/2022 1.19 (H) 0.85 - 1.15 Final     Last Comprehensive Metabolic Panel:  Sodium   Date Value Ref Range Status   11/29/2022 141 136 - 145 mmol/L Final     Potassium   Date Value Ref Range Status   11/29/2022 3.9 3.4 - 5.3 mmol/L Final   06/01/2022 4.0 3.5 - 5.0 mmol/L Final     Chloride   Date Value Ref Range Status   11/29/2022 105 98 - 107 mmol/L Final   06/01/2022 104 98 - 107 mmol/L Final     Carbon Dioxide (CO2)   Date Value Ref Range Status   11/29/2022 26 22 - 29 mmol/L Final   06/01/2022 24 22 - 31 mmol/L Final     Anion Gap   Date Value Ref Range Status   11/29/2022 10 7 - 15 mmol/L Final   06/01/2022 11 5 - 18 mmol/L Final     Glucose   Date Value Ref Range Status   11/29/2022 107 (H) 70 - 99 mg/dL Final   06/01/2022 80 70 - 125 mg/dL Final     GLUCOSE BY METER POCT    Date Value Ref Range Status   09/14/2022 97 70 - 99 mg/dL Final     Urea Nitrogen   Date Value Ref Range Status   11/29/2022 13.5 6.0 - 20.0 mg/dL Final   06/01/2022 12 8 - 22 mg/dL Final     Creatinine   Date Value Ref Range Status   11/29/2022 0.93 0.67 - 1.17 mg/dL Final     GFR Estimate   Date Value Ref Range Status   11/29/2022 >90 >60 mL/min/1.73m2 Final     Comment:     Effective December 21, 2021 eGFRcr in adults is calculated using the 2021 CKD-EPI creatinine equation which includes age and gender (Brijesh et al., NE, DOI: 10.1056/RNVSam5522018)     Calcium   Date Value Ref Range Status   11/29/2022 9.3 8.6 - 10.0 mg/dL Final     Bilirubin Total   Date Value Ref Range Status   11/29/2022 1.7 (H) <=1.2 mg/dL Final     Alkaline Phosphatase   Date Value Ref Range Status   11/29/2022 118 40 - 129 U/L Final     ALT   Date Value Ref Range Status   11/29/2022 40 10 - 50 U/L Final     AST   Date Value Ref Range Status   11/29/2022 57 (H) 10 - 50 U/L Final             Lab Results   Component Value Date    WBC 8.3 09/10/2021     Lab Results   Component Value Date    RBC 2.64 09/10/2021     Lab Results   Component Value Date    HGB 7.9 09/10/2021     Lab Results   Component Value Date    HCT 24.3 09/10/2021     No components found for: MCT  Lab Results   Component Value Date    MCV 92 09/10/2021     Lab Results   Component Value Date    MCH 29.9 09/10/2021     Lab Results   Component Value Date    MCHC 32.5 09/10/2021     Lab Results   Component Value Date    RDW 14.4 09/10/2021     Lab Results   Component Value Date     09/10/2021     MELD-Na score: 10 at 11/29/2022 10:17 AM  MELD score: 10 at 11/29/2022 10:17 AM  Calculated from:  Serum Creatinine: 0.93 mg/dL (Using min of 1 mg/dL) at 11/29/2022 10:17 AM  Serum Sodium: 141 mmol/L (Using max of 137 mmol/L) at 11/29/2022 10:17 AM  Total Bilirubin: 1.7 mg/dL at 11/29/2022 10:17 AM  INR(ratio): 1.19 at 11/29/2022 10:17 AM  Age: 42 years    Imaging: Reviewed in  EHR    Endoscopy: Reviewed in EHR    Independently reviewed labs and imaging.      Assessment/Plan: Mr. Nowak is a 42 year old man with a history of AUD, alcohol related cirrhosis c/b refractory ascites/SBP, recurrent variceal bleeding, now s/p TIPS 9/2021    Ascites improved post TIPS, but had issues with umbilical hernia after his TIPS and on 11/11 was unable to reduce and required surgical repair. Underwent recurrent umbilical hernia repair 9/2022, doing well after this    -Lasix 80 mg twice a day, Eplerenone 25 mg daily - can try decreasing lasix to daily 1/2023. Stopped cipro given only trace ascites  -Continue low sodium diet  -Discussed complete sobriety from alcohol, including NA beers  - HCC screening with AFP and RUQ US q6months, TIPS US every 6 months  -Ok to stop iron given hgb improved    Orders Placed This Encounter   Procedures     US Abdomen Limited with TIPSS Doppler     Comprehensive metabolic panel (BMP + Alb, Alk Phos, ALT, AST, Total. Bili, TP)     CBC with platelets     INR     AFP tumor marker     AFP tumor marker     RTC 6 months with RUQ US    Cadence Murray MD MS  Hepatology/Liver Transplant  [facility]

## 2022-11-30 ENCOUNTER — PATIENT OUTREACH (OUTPATIENT)
Dept: GASTROENTEROLOGY | Facility: CLINIC | Age: 42
End: 2022-11-30

## 2022-11-30 NOTE — PROGRESS NOTES
Pt had hepatology follow up on 11/29. Plan established per Dr. Murray:    1) Decrease lasix to 80 mg daily (currently taking lasix 80 mg 2 times daily)  2) Continue eplerenone 25 mg daily  3) Discontinue ciprofloxacin for SBP prophylaxis  4) Continue low Na diet  5) Complete sobriety from alcohol, including NA beers  6) HCC screening with AFP and RUQ US every 6 months   7) Okay to stop oral iron supplement  8) RTC in 6 months    Will check in and review plan of care with pt in 1 month.

## 2023-01-02 ENCOUNTER — APPOINTMENT (OUTPATIENT)
Dept: RADIOLOGY | Facility: HOSPITAL | Age: 43
End: 2023-01-02
Attending: EMERGENCY MEDICINE
Payer: COMMERCIAL

## 2023-01-02 ENCOUNTER — HOSPITAL ENCOUNTER (EMERGENCY)
Facility: HOSPITAL | Age: 43
Discharge: HOME | End: 2023-01-02
Attending: EMERGENCY MEDICINE | Admitting: EMERGENCY MEDICINE
Payer: COMMERCIAL

## 2023-01-02 VITALS
RESPIRATION RATE: 16 BRPM | WEIGHT: 268 LBS | DIASTOLIC BLOOD PRESSURE: 79 MMHG | OXYGEN SATURATION: 97 % | TEMPERATURE: 98.7 F | BODY MASS INDEX: 36.35 KG/M2 | HEART RATE: 82 BPM | SYSTOLIC BLOOD PRESSURE: 126 MMHG

## 2023-01-02 DIAGNOSIS — R07.9 CHEST PAIN, UNSPECIFIED TYPE: ICD-10-CM

## 2023-01-02 LAB
ALBUMIN SERPL BCG-MCNC: 3.9 G/DL (ref 3.5–5.2)
ALP SERPL-CCNC: 100 U/L (ref 40–129)
ALT SERPL W P-5'-P-CCNC: 38 U/L (ref 10–50)
ANION GAP SERPL CALCULATED.3IONS-SCNC: 10 MMOL/L (ref 7–15)
AST SERPL W P-5'-P-CCNC: 57 U/L (ref 10–50)
BILIRUB DIRECT SERPL-MCNC: 0.28 MG/DL (ref 0–0.3)
BILIRUB SERPL-MCNC: 1 MG/DL
BUN SERPL-MCNC: 14.8 MG/DL (ref 6–20)
CALCIUM SERPL-MCNC: 9.5 MG/DL (ref 8.6–10)
CHLORIDE SERPL-SCNC: 105 MMOL/L (ref 98–107)
CREAT SERPL-MCNC: 1.18 MG/DL (ref 0.67–1.17)
D DIMER PPP FEU-MCNC: 0.65 UG/ML FEU (ref 0–0.5)
DEPRECATED HCO3 PLAS-SCNC: 26 MMOL/L (ref 22–29)
ERYTHROCYTE [DISTWIDTH] IN BLOOD BY AUTOMATED COUNT: 13.9 % (ref 10–15)
GFR SERPL CREATININE-BSD FRML MDRD: 78 ML/MIN/1.73M2
GLUCOSE SERPL-MCNC: 109 MG/DL (ref 70–99)
HCT VFR BLD AUTO: 36.1 % (ref 40–53)
HGB BLD-MCNC: 13.2 G/DL (ref 13.3–17.7)
LIPASE SERPL-CCNC: 70 U/L (ref 13–60)
MCH RBC QN AUTO: 32.2 PG (ref 26.5–33)
MCHC RBC AUTO-ENTMCNC: 36.6 G/DL (ref 31.5–36.5)
MCV RBC AUTO: 88 FL (ref 78–100)
PLATELET # BLD AUTO: 128 10E3/UL (ref 150–450)
POTASSIUM SERPL-SCNC: 3.6 MMOL/L (ref 3.4–5.3)
PROT SERPL-MCNC: 7 G/DL (ref 6.4–8.3)
RBC # BLD AUTO: 4.1 10E6/UL (ref 4.4–5.9)
RBC MORPH BLD: NORMAL
SODIUM SERPL-SCNC: 141 MMOL/L (ref 136–145)
TROPONIN T SERPL HS-MCNC: 12 NG/L
WBC # BLD AUTO: 6.4 10E3/UL (ref 4–11)

## 2023-01-02 PROCEDURE — 93005 ELECTROCARDIOGRAM TRACING: CPT | Performed by: EMERGENCY MEDICINE

## 2023-01-02 PROCEDURE — 83690 ASSAY OF LIPASE: CPT | Performed by: EMERGENCY MEDICINE

## 2023-01-02 PROCEDURE — 85027 COMPLETE CBC AUTOMATED: CPT | Performed by: EMERGENCY MEDICINE

## 2023-01-02 PROCEDURE — 99285 EMERGENCY DEPT VISIT HI MDM: CPT | Mod: 25

## 2023-01-02 PROCEDURE — 85379 FIBRIN DEGRADATION QUANT: CPT | Performed by: EMERGENCY MEDICINE

## 2023-01-02 PROCEDURE — 80053 COMPREHEN METABOLIC PANEL: CPT | Performed by: EMERGENCY MEDICINE

## 2023-01-02 PROCEDURE — 84484 ASSAY OF TROPONIN QUANT: CPT | Performed by: EMERGENCY MEDICINE

## 2023-01-02 PROCEDURE — 36415 COLL VENOUS BLD VENIPUNCTURE: CPT | Performed by: EMERGENCY MEDICINE

## 2023-01-02 PROCEDURE — 71046 X-RAY EXAM CHEST 2 VIEWS: CPT

## 2023-01-02 PROCEDURE — 82248 BILIRUBIN DIRECT: CPT | Performed by: EMERGENCY MEDICINE

## 2023-01-02 ASSESSMENT — ACTIVITIES OF DAILY LIVING (ADL)
ADLS_ACUITY_SCORE: 33
ADLS_ACUITY_SCORE: 33

## 2023-01-03 NOTE — ED TRIAGE NOTES
"  Pt arrives c/o faint chest pain that started yesterday and today is feeling \"pinching\" in the middle of his chest. VSS. Denies SOB. Denies cardiac history. No recent illness   Triage Assessment     Row Name 01/02/23 1980       Triage Assessment (Adult)    Airway WDL WDL       Respiratory WDL    Respiratory WDL WDL       Skin Circulation/Temperature WDL    Skin Circulation/Temperature WDL WDL       Cardiac WDL    Cardiac WDL X;chest pain       Chest Pain Assessment    Chest Pain Location midsternal    Character other (see comments)  pinching       Peripheral/Neurovascular WDL    Peripheral Neurovascular WDL WDL       Cognitive/Neuro/Behavioral WDL    Cognitive/Neuro/Behavioral WDL WDL              "

## 2023-01-03 NOTE — DISCHARGE INSTRUCTIONS
You were seen in the emergency department at [facility] for chest discomfort.  Your evaluation today included an EKG and blood testing which looks reassuring against this being heart attack or injury.  Your x-ray also looked reassuring.  You did have a somewhat abnormal D-dimer which is a blood test which can indicate blood clotting and the possibility of a pulmonary embolism was discussed.  If you have any severe worsening of your pain, shortness of breath, or worsening symptoms we would like you to return to the emergency department so that we can continue your evaluation with a CT scan.  You can also discuss things further with your primary doctor or contact a heart specialist using the information above if pain is persistent.

## 2023-01-03 NOTE — ED PROVIDER NOTES
EMERGENCY DEPARTMENT ENCOUNTER      NAME: Florian Nowak  AGE: 42 year old male  YOB: 1980  MRN: [id number]  EVALUATION DATE & TIME: No admission date for patient encounter.    PCP: No Ref-Primary, Physician    ED PROVIDER: Migel Hall M.D.      Chief Complaint   Patient presents with     Chest Pain         FINAL IMPRESSION:  1. Chest pain, unspecified type          ED COURSE & MEDICAL DECISION MAKIN:30 PM I met with patient for initial interview and encounter. PPE worn includes N95 mask. We discussed plan for discharge.    42 year old male presents to the Emergency Department for evaluation of chest pain.  He has a history of alcoholic cirrhosis status post TIPS procedure.  He presents with chest pinching discomfort noticeable with arm movements and to some degree with deep breathing.  He underwent lab and imaging work-up as below.  His EKG shows sinus rhythm, nonspecific ST-T wave abnormality but nothing which appears concerning for acute ischemia.  Troponin is within gender adjusted normal more than a day after onset of symptoms ruling out ACS.  Chest x-ray is clear of infiltrate or pneumothorax.  Other labs appear stable. He does have an elevated D-dimer.  We discussed this finding and the recommendation for follow-up CT chest.  Patient is adamant that he has had this similar pain about 10 years ago and mostly came in to get his heart checked with an EKG and blood testing.  He expressed is from believe that this was not a pulmonary embolism and did not at all desire to undergo CT imaging at this juncture.  His symptoms do not seem to completely correlate and I do think it is reasonable to hold off on CT imaging as this is the patient's strong preference and we could manage this symptomatically with continued outpatient follow-up and return precautions if he reconsiders or if symptoms worsen or if he develops any new dyspnea or other immediate concern.  Patient can continue to  "review any ongoing symptoms in his primary care clinic or with rapid access cardiology clinic.    At the conclusion of the encounter I discussed the results of all of the tests and the disposition. The questions were answered. The patient or family acknowledged understanding and was agreeable with the care plan.       Medical Decision Making    History:    Supplemental history from: Documented in HPI, if applicable    External Record(s) reviewed: Documented in HPI, if applicable.    Work Up:    Chart documentation includes differential considered and any EKGs or imaging independently interpreted by provider.    In additional to work up documented, I considered the following work up: See chart documentation, if applicable.    External consultation:    Discussion of management with another provider: See chart documentation, if applicable    Complicating factors:    Care impacted by chronic illness: Hyperlipidemia, Hypertension and Other: Alcoholic cirrhosis    Care affected by social determinants of health: N/A    Disposition considerations: Discharge. No recommendations on prescription strength medication(s). N/A.            MEDICATIONS GIVEN IN THE EMERGENCY:  Medications - No data to display    NEW PRESCRIPTIONS STARTED AT TODAY'S ER VISIT  New Prescriptions    No medications on file          =================================================================    HPI    Patient information was obtained from: Patient     Use of : N/A         Florian Nowak is a 42 year old male with a pertinent history of alcoholic cirrhosis, HLD, HTN, who presents to this ED via walk-in for evaluation of chest pain.    Patient reports he has had 2 days of intermittent 3-4/10 \"pinching\" left sided chest pain. He states that this pain is more prominent today than yesterday, and states that it seems to be provoked by leaning forward. Denies any radiation to his arm. He notes that he had similar pain when he was in his " 30s, and states that he has since had multiple normal ECHOs. However he states that he is no longer able to take ibuprofen due to his cirrhosis, and this was the recommended treatment at the time he previously had this pain. He denies any leg swelling, cough, fever, abdominal pain, vomiting, or any other complaints.      REVIEW OF SYSTEMS   All systems reviewed and negative except as noted in HPI.    PAST MEDICAL HISTORY:  Past Medical History:   Diagnosis Date     Diabetes (H)      History of blood transfusion      Hypertension        PAST SURGICAL HISTORY:  Past Surgical History:   Procedure Laterality Date     COLONOSCOPY N/A 04/28/2022    Procedure: COLONOSCOPY, WITH POLYPECTOMY AND CLIPS;  Surgeon: Cadence Murray MD;  Location: [facility] OR     ESOPHAGOSCOPY, GASTROSCOPY, DUODENOSCOPY (EGD), COMBINED N/A 04/28/2022    Procedure: ESOPHAGOGASTRODUODENOSCOPY, WITH BIOPSY;  Surgeon: Cadence Murray MD;  Location: [facility] OR     HERNIORRHAPHY UMBILICAL N/A 11/11/2021    Procedure: HERNIORRHAPHY, UMBILICAL, OPEN;  Surgeon: Costa Malave MD;  Location: [facility] OR     HERNIORRHAPHY UMBILICAL N/A 09/14/2022    Procedure: Open Umbilical Hernia Repair with Mesh;  Surgeon: Burak Real MD;  Location: [facility] OR     LASIK Bilateral            CURRENT MEDICATIONS:    No current facility-administered medications for this encounter.     Current Outpatient Medications   Medication     eplerenone (INSPRA) 25 MG tablet     ferrous sulfate (FEROSUL) 325 (65 Fe) MG tablet     furosemide (LASIX) 40 MG tablet     melatonin 3 MG CAPS         ALLERGIES:  Allergies   Allergen Reactions     Bee Venom      Allergic to Bee Stings-swelling.     Ketorolac Tromethamine Other (See Comments)       FAMILY HISTORY:  Family History   Problem Relation Age of Onset     Glaucoma Other         MGGM     Macular Degeneration No family hx of        SOCIAL HISTORY:   Social History     Socioeconomic History     Marital status:    Tobacco Use     Smoking  status: Former     Smokeless tobacco: Never     Tobacco comments:     College   Substance and Sexual Activity     Alcohol use: Not Currently     Comment: Quit ETOH 10/2020     Drug use: Not Currently       VITALS:  BP (!) 159/92   Pulse 106   Temp 98.7  F (37.1  C) (Oral)   Resp 16   Wt 121.6 kg (268 lb)   SpO2 99%   BMI 36.35 kg/m      PHYSICAL EXAM    Constitutional: Well developed, Well nourished, NAD.  HENT: Normocephalic, Atraumatic. Neck Supple.  Eyes: EOMI, Conjunctiva normal.  Respiratory: Breathing comfortably on room air. Speaks full sentences easily. Lungs clear to ascultation.  Cardiovascular: Normal heart rate, Regular rhythm. No peripheral edema.  Abdomen: Soft, nontender  Musculoskeletal: Good range of motion in all major joints. No major deformities noted.  Integument: Warm, Dry.  Neurologic: Alert & awake, Normal motor function, Normal sensory function, No focal deficits noted.   Psychiatric: Cooperative. Affect appropriate.     LAB:  All pertinent labs reviewed and interpreted.  Labs Ordered and Resulted from Time of ED Arrival to Time of ED Departure   BASIC METABOLIC PANEL - Abnormal       Result Value    Sodium 141      Potassium 3.6      Chloride 105      Carbon Dioxide (CO2) 26      Anion Gap 10      Urea Nitrogen 14.8      Creatinine 1.18 (*)     Calcium 9.5      Glucose 109 (*)     GFR Estimate 78     D DIMER QUANTITATIVE - Abnormal    D-Dimer Quantitative 0.65 (*)    HEPATIC FUNCTION PANEL - Abnormal    Protein Total 7.0      Albumin 3.9      Bilirubin Total 1.0      Alkaline Phosphatase 100      AST 57 (*)     ALT 38      Bilirubin Direct 0.28     LIPASE - Abnormal    Lipase 70 (*)    TROPONIN T, HIGH SENSITIVITY - Normal    Troponin T, High Sensitivity 12     CBC WITH PLATELETS       RADIOLOGY:  Reviewed all pertinent imaging. Please see official radiology report.  Chest XR,  PA & LAT   Final Result   IMPRESSION:       Heart size is normal. Lungs are clear bilaterally. Mediastinum  and visualized bony structures are unremarkable.          EKG:    Performed at: 1852    Impression: Sinus rhythm, nonspecific ST-T wave abnormality    Rate: 86  Rhythm: Sinus  Axis: Normal  SD Interval: 172  QRS Interval: 88  QTc Interval: 449  ST Changes: Nonspecific ST abnormality  Comparison: None available    I have independently reviewed and interpreted the EKG(s) documented above.      I, Amadeo Gleason, am serving as a scribe to document services personally performed by Dr. Migel Hall, based on my observation and the provider's statements to me. I, Migel Hall MD attest that Amadeo Gleason is acting in a scribe capacity, has observed my performance of the services and has documented them in accordance with my direction.    Migel Hall M.D.  Emergency Medicine  [facility] EMERGENCY DEPARTMENT  [address] [place] [id number]  [phone]  Dept: [phone]     Migel Hall MD  01/02/23 [id number]       Migel Hall MD  01/02/23 [id number]

## 2023-03-03 ENCOUNTER — TELEPHONE (OUTPATIENT)
Dept: GASTROENTEROLOGY | Facility: CLINIC | Age: 43
End: 2023-03-03

## 2023-03-03 NOTE — TELEPHONE ENCOUNTER
LVM & sent MyChart message // pt needs to reschedule visit with Dr. Murray on 6.6.23 - pt can reschedule all appts to be on the same day or can keep labs and imaging on 6.6.23 and see Dr. Murray virtually // first attempt, AN 3.3.23

## 2023-03-14 ENCOUNTER — TELEPHONE (OUTPATIENT)
Dept: GASTROENTEROLOGY | Facility: CLINIC | Age: 43
End: 2023-03-14

## 2023-03-14 NOTE — TELEPHONE ENCOUNTER
mobile number not in service // LVM & sent MyChart & sent physical letter // pt needs to reschedule appt with Dr. Murray on 6.6.23 // second attempt, appt now cancelled, AN 3.14.23

## 2023-04-25 DIAGNOSIS — H35.30 ARMD (AGE-RELATED MACULAR DEGENERATION), BILATERAL: Primary | ICD-10-CM

## 2023-04-27 ENCOUNTER — PATIENT OUTREACH (OUTPATIENT)
Dept: GASTROENTEROLOGY | Facility: CLINIC | Age: 43
End: 2023-04-27
Payer: COMMERCIAL

## 2023-04-27 NOTE — PROGRESS NOTES
Per Dr. Murray, pt no longer requires care coordination through the hepatology clinic. Will sign off of care coordination but am able to assist pt if needs change.

## 2023-05-09 ENCOUNTER — OFFICE VISIT (OUTPATIENT)
Dept: OPHTHALMOLOGY | Facility: CLINIC | Age: 43
End: 2023-05-09
Attending: OPHTHALMOLOGY
Payer: COMMERCIAL

## 2023-05-09 DIAGNOSIS — H35.54 PATTERN DYSTROPHY OF MACULA: Primary | ICD-10-CM

## 2023-05-09 DIAGNOSIS — H52.13 MYOPIA OF BOTH EYES: ICD-10-CM

## 2023-05-09 DIAGNOSIS — H35.3130 BILATERAL NONEXUDATIVE AGE-RELATED MACULAR DEGENERATION, UNSPECIFIED STAGE: ICD-10-CM

## 2023-05-09 PROCEDURE — 92134 CPTRZ OPH DX IMG PST SGM RTA: CPT | Performed by: OPHTHALMOLOGY

## 2023-05-09 PROCEDURE — 99213 OFFICE O/P EST LOW 20 MIN: CPT | Performed by: OPHTHALMOLOGY

## 2023-05-09 PROCEDURE — 92250 FUNDUS PHOTOGRAPHY W/I&R: CPT | Performed by: OPHTHALMOLOGY

## 2023-05-09 PROCEDURE — 99207 FUNDUS AUTOFLUORESCENCE IMAGE (FAF) OU (BOTH EYES): CPT | Performed by: OPHTHALMOLOGY

## 2023-05-09 PROCEDURE — 99214 OFFICE O/P EST MOD 30 MIN: CPT | Performed by: OPHTHALMOLOGY

## 2023-05-09 ASSESSMENT — VISUAL ACUITY
OS_SC: 20/20
METHOD: SNELLEN - LINEAR
OD_SC+: +2
OD_SC: 20/25

## 2023-05-09 ASSESSMENT — TONOMETRY
OS_IOP_MMHG: 15
IOP_METHOD: TONOPEN
OD_IOP_MMHG: 16

## 2023-05-09 ASSESSMENT — CONF VISUAL FIELD
OD_SUPERIOR_TEMPORAL_RESTRICTION: 0
OD_SUPERIOR_NASAL_RESTRICTION: 0
OS_INFERIOR_NASAL_RESTRICTION: 0
OS_INFERIOR_TEMPORAL_RESTRICTION: 0
OS_SUPERIOR_NASAL_RESTRICTION: 0
OD_INFERIOR_TEMPORAL_RESTRICTION: 0
OS_NORMAL: 1
OD_NORMAL: 1
OD_INFERIOR_NASAL_RESTRICTION: 0
METHOD: COUNTING FINGERS
OS_SUPERIOR_TEMPORAL_RESTRICTION: 0

## 2023-05-09 ASSESSMENT — CUP TO DISC RATIO
OS_RATIO: 0.1
OD_RATIO: 0.1

## 2023-05-09 ASSESSMENT — EXTERNAL EXAM - LEFT EYE: OS_EXAM: NORMAL

## 2023-05-09 ASSESSMENT — EXTERNAL EXAM - RIGHT EYE: OD_EXAM: NORMAL

## 2023-05-09 ASSESSMENT — SLIT LAMP EXAM - LIDS: COMMENTS: NORMAL

## 2023-05-09 NOTE — PROGRESS NOTES
CC -   Retinal evaluation    INTERVAL HISTORY - no change in vision    HPI -   Florian Nowak is a  42 year old year-old patient presenting for retinal consultation regarding dystrophy. For the past 7 years has been told by optometrist that he should be evaluated by ophthalmologist. Seen at [facility] in [place] 6 months ago and had extensive work up (including FA). Diagnosed with AMD. Recommended u2oxydx exams. Here for [id number] exam and 2nd opinion.     He reports he is visually asymptomatic. No distortion of the vision, no flashes, no floaters. No distortion on amsler grid.    PMHx: Cirrhosis (from alcohol use), portal hypertension s/p TIPS (1 yr ago), HTN,   PSHx: TIPS, Hernia repair   FHx: MGM with glaucoma, no h/o macular degeneration, no h/o retinal dystrophies  Meds: Furosemide, eplerenone (6months ago), AREDS 2 (6 months ago)    PAST OCULAR SURGERY  LASIK (2-3 years ago by Dr. Espino) - near sighted per history     RETINAL IMAGING:  OCT 5/9/2023  OD - temporal drusen, subretinal coalescing hyperreflective deposits, no subretinal fluid or intraretinal fluid, PHF attached. Not subfoveal  OS - temporal drusen, subretinal coalescing hyperreflective deposits, no subretinal fluid or intraretinal fluid, PHF attached. Not subfoveal    AF 11/09/22 and 5/9/2023  right eye - mixed hyper/hypoautofluorescence stippled pattern with more hypofluoresence closer to fovea and predominantly hyperautofluoresence more peripherally   left eye - mixed hyper/hypoautofluorescence stippled pattern with more hypofluoresence closer to fovea and predominantly hyperautofluoresence more peripherally     Optos 11/09/22   right eye - area of hypopigmentation temporal to fovea otherwise normal   left eye - area of hypopigmentation temporal to fovea otherwise normal     FA/ICGA 11/9/2  each eye - CNVM absent; lesions localized on temporal hypovascular choroid    ASSESSMENT & PLAN    #Torpedo maculopathy, each eye   - differential includes  ARMD (non-exudative) vs  malatia levintense though unlikely vs reticular dystrophy  - no history of nyctalopia  - Patient asymptomatic, denies any history of other supplements  - no indication to take AREDS2 vitamin - hepatologist did clear him for taking this given the concern for large amount of zinc/copper in these vitamins. Okay to stop.  - Patient is non smoker  - Encouraged him to use amsler grid [id number] to check for distortion and to call if new symptoms start    #h/o high myopia  - s/p LASIK (pre lasik prescription unknown)     return to clinic: 6-12 months VTD, [place] FAF, OCT Macula         Complete documentation of historical and exam elements from today's encounter can be found in the full encounter summary report (not reduplicated in this progress note). I personally obtained the chief complaint(s) and history of present illness.  I confirmed and edited as necessary the review of systems, past medical/surgical history, family history, social history, and examination findings as documented by others; and I examined the patient myself. I personally reviewed the relevant tests, images, and reports as documented above. I formulated and edited as necessary the assessment and plan and discussed the findings and management plan with the patient and family.     Juan Antonio Tobar MD, PhD

## 2023-05-09 NOTE — NURSING NOTE
Chief Complaints and History of Present Illnesses   Patient presents with     Follow Up     Chief Complaint(s) and History of Present Illness(es)     Follow Up            Laterality: both eyes    Associated symptoms: dryness.  Negative for flashes and floaters    Treatments tried: artificial tears    Pain scale: 0/10          Comments    Follow up for pattern dystrophy.    Patient reports stable vision.  Rebekah Florian, COA, COA 7:50 AM 05/09/2023

## 2023-05-15 DIAGNOSIS — K70.31 ALCOHOLIC CIRRHOSIS OF LIVER WITH ASCITES (H): Primary | ICD-10-CM

## 2023-05-15 DIAGNOSIS — R25.2 CRAMP OF LIMB: ICD-10-CM

## 2023-06-05 ENCOUNTER — TELEPHONE (OUTPATIENT)
Dept: GASTROENTEROLOGY | Facility: CLINIC | Age: 43
End: 2023-06-05
Payer: COMMERCIAL

## 2023-06-06 NOTE — TELEPHONE ENCOUNTER
Mr. Nowak sent a message asking about vyvanese use in cirrhosis - ok to use with his PCP but would start at a lower dose and monitor liver enzymes on this

## 2023-08-22 DIAGNOSIS — Z95.828 S/P TIPS (TRANSJUGULAR INTRAHEPATIC PORTOSYSTEMIC SHUNT): ICD-10-CM

## 2023-08-22 RX ORDER — EPLERENONE 25 MG/1
25 TABLET, FILM COATED ORAL DAILY
Qty: 90 TABLET | Refills: 3 | Status: SHIPPED | OUTPATIENT
Start: 2023-08-22

## 2023-08-23 ENCOUNTER — HOSPITAL ENCOUNTER (OUTPATIENT)
Dept: ULTRASOUND IMAGING | Facility: HOSPITAL | Age: 43
Discharge: HOME | End: 2023-08-23
Attending: STUDENT IN AN ORGANIZED HEALTH CARE EDUCATION/TRAINING PROGRAM | Admitting: STUDENT IN AN ORGANIZED HEALTH CARE EDUCATION/TRAINING PROGRAM
Payer: COMMERCIAL

## 2023-08-23 DIAGNOSIS — Z95.828 S/P TIPS (TRANSJUGULAR INTRAHEPATIC PORTOSYSTEMIC SHUNT): ICD-10-CM

## 2023-08-23 DIAGNOSIS — Z95.828 S/P TIPS (TRANSJUGULAR INTRAHEPATIC PORTOSYSTEMIC SHUNT): Primary | ICD-10-CM

## 2023-08-23 DIAGNOSIS — K70.31 ALCOHOLIC CIRRHOSIS OF LIVER WITH ASCITES (H): ICD-10-CM

## 2023-08-23 PROCEDURE — 93975 VASCULAR STUDY: CPT

## 2023-08-23 RX ORDER — FUROSEMIDE 40 MG
80 TABLET ORAL DAILY
Qty: 180 TABLET | Refills: 3 | Status: SHIPPED | OUTPATIENT
Start: 2023-08-23 | End: 2024-08-17

## 2023-08-26 NOTE — ED NOTES
ED Provider In Triage Note  [facility]  Encounter Date: Jan 2, 2023    Chief Complaint   Patient presents with     Chest Pain       Brief HPI:   Florian Nowak is a 42 year old male presenting to the Emergency Department with a chief complaint of chest pain. Pinching in center of chest. Worse with deep breathing. Nonradiating.     Brief Physical Exam:  BP (!) 159/92   Pulse 106   Temp 98.7  F (37.1  C) (Oral)   Resp 16   Wt 121.6 kg (268 lb)   SpO2 99%   BMI 36.35 kg/m    General: Non-toxic appearing  HEENT: Atraumatic  Resp: No respiratory distress  Abdomen: Non-peritoneal  Neuro: Alert, oriented, answers questions appropriately  Psych: Behavior appropriate    Plan Initiated in Triage:  Ddx includes: acs, pleurisy. Pna. Epigastric labs ordered.    PIT Dispo:   Return to lobby while awaiting workup and ED bed availability    Chinmay Purdy MD on 1/2/2023 at 6:24 PM    Patient was evaluated by the Physician in Triage due to a limitation of available rooms in the Emergency Department. A plan of care was discussed based on the information obtained on the initial evaluation and patient was consuled to return back to the Emergency Department lobby after this initial evalutaiton until results were obtained or a room became available in the Emergency Department. Patient was counseled not to leave prior to receiving the results of their workup.     Chinmay Purdy MD  [facility] EMERGENCY DEPARTMENT  [address] [place] [id number]  [phone]       Chinmay Purdy MD  01/02/23 [id number]     Spontaneous, unlabored and symmetrical needs device and assist

## 2023-08-30 ENCOUNTER — OFFICE VISIT (OUTPATIENT)
Dept: GASTROENTEROLOGY | Facility: CLINIC | Age: 43
End: 2023-08-30
Attending: STUDENT IN AN ORGANIZED HEALTH CARE EDUCATION/TRAINING PROGRAM
Payer: COMMERCIAL

## 2023-08-30 ENCOUNTER — LAB (OUTPATIENT)
Dept: LAB | Facility: CLINIC | Age: 43
End: 2023-08-30
Payer: COMMERCIAL

## 2023-08-30 VITALS
WEIGHT: 267.4 LBS | SYSTOLIC BLOOD PRESSURE: 138 MMHG | HEIGHT: 72 IN | TEMPERATURE: 98.3 F | DIASTOLIC BLOOD PRESSURE: 86 MMHG | BODY MASS INDEX: 36.22 KG/M2 | HEART RATE: 86 BPM | RESPIRATION RATE: 18 BRPM | OXYGEN SATURATION: 98 %

## 2023-08-30 DIAGNOSIS — Z95.828 S/P TIPS (TRANSJUGULAR INTRAHEPATIC PORTOSYSTEMIC SHUNT): Primary | ICD-10-CM

## 2023-08-30 DIAGNOSIS — R25.2 CRAMP OF LIMB: ICD-10-CM

## 2023-08-30 DIAGNOSIS — K70.31 ALCOHOLIC CIRRHOSIS OF LIVER WITH ASCITES (H): ICD-10-CM

## 2023-08-30 LAB
AFP SERPL-MCNC: 2.5 NG/ML
ALBUMIN SERPL BCG-MCNC: 4.1 G/DL (ref 3.5–5.2)
ALP SERPL-CCNC: 119 U/L (ref 40–129)
ALT SERPL W P-5'-P-CCNC: 53 U/L (ref 0–70)
ANION GAP SERPL CALCULATED.3IONS-SCNC: 9 MMOL/L (ref 7–15)
AST SERPL W P-5'-P-CCNC: 62 U/L (ref 0–45)
BILIRUB DIRECT SERPL-MCNC: 0.43 MG/DL (ref 0–0.3)
BILIRUB SERPL-MCNC: 1.2 MG/DL
BUN SERPL-MCNC: 15.4 MG/DL (ref 6–20)
CALCIUM SERPL-MCNC: 10.3 MG/DL (ref 8.6–10)
CHLORIDE SERPL-SCNC: 111 MMOL/L (ref 98–107)
CREAT SERPL-MCNC: 0.79 MG/DL (ref 0.67–1.17)
DEPRECATED HCO3 PLAS-SCNC: 21 MMOL/L (ref 22–29)
ERYTHROCYTE [DISTWIDTH] IN BLOOD BY AUTOMATED COUNT: 13.5 % (ref 10–15)
GFR SERPL CREATININE-BSD FRML MDRD: >90 ML/MIN/1.73M2
GLUCOSE SERPL-MCNC: 99 MG/DL (ref 70–99)
HCT VFR BLD AUTO: 39.5 % (ref 40–53)
HGB BLD-MCNC: 14.3 G/DL (ref 13.3–17.7)
INR PPP: 1.22 (ref 0.85–1.15)
MAGNESIUM SERPL-MCNC: 2 MG/DL (ref 1.7–2.3)
MCH RBC QN AUTO: 32.3 PG (ref 26.5–33)
MCHC RBC AUTO-ENTMCNC: 36.2 G/DL (ref 31.5–36.5)
MCV RBC AUTO: 89 FL (ref 78–100)
PLATELET # BLD AUTO: 111 10E3/UL (ref 150–450)
POTASSIUM SERPL-SCNC: 4.3 MMOL/L (ref 3.4–5.3)
PROT SERPL-MCNC: 6.8 G/DL (ref 6.4–8.3)
RBC # BLD AUTO: 4.43 10E6/UL (ref 4.4–5.9)
SODIUM SERPL-SCNC: 141 MMOL/L (ref 136–145)
WBC # BLD AUTO: 4.8 10E3/UL (ref 4–11)

## 2023-08-30 PROCEDURE — 80053 COMPREHEN METABOLIC PANEL: CPT | Performed by: PATHOLOGY

## 2023-08-30 PROCEDURE — 36415 COLL VENOUS BLD VENIPUNCTURE: CPT | Performed by: PATHOLOGY

## 2023-08-30 PROCEDURE — 85610 PROTHROMBIN TIME: CPT | Performed by: PATHOLOGY

## 2023-08-30 PROCEDURE — 99000 SPECIMEN HANDLING OFFICE-LAB: CPT | Performed by: PATHOLOGY

## 2023-08-30 PROCEDURE — 99214 OFFICE O/P EST MOD 30 MIN: CPT | Performed by: STUDENT IN AN ORGANIZED HEALTH CARE EDUCATION/TRAINING PROGRAM

## 2023-08-30 PROCEDURE — 99213 OFFICE O/P EST LOW 20 MIN: CPT | Performed by: STUDENT IN AN ORGANIZED HEALTH CARE EDUCATION/TRAINING PROGRAM

## 2023-08-30 PROCEDURE — 82105 ALPHA-FETOPROTEIN SERUM: CPT | Performed by: STUDENT IN AN ORGANIZED HEALTH CARE EDUCATION/TRAINING PROGRAM

## 2023-08-30 PROCEDURE — 85027 COMPLETE CBC AUTOMATED: CPT | Performed by: PATHOLOGY

## 2023-08-30 PROCEDURE — 83735 ASSAY OF MAGNESIUM: CPT | Performed by: PATHOLOGY

## 2023-08-30 PROCEDURE — 82248 BILIRUBIN DIRECT: CPT | Performed by: PATHOLOGY

## 2023-08-30 RX ORDER — LISDEXAMFETAMINE DIMESYLATE 40 MG/1
40 CAPSULE ORAL DAILY
COMMUNITY
Start: 2023-07-20 | End: 2023-10-18

## 2023-08-30 RX ORDER — TADALAFIL 20 MG/1
10 TABLET ORAL DAILY PRN
COMMUNITY
Start: 2023-07-20 | End: 2023-10-18

## 2023-08-30 ASSESSMENT — PAIN SCALES - GENERAL: PAINLEVEL: NO PAIN (0)

## 2023-08-30 NOTE — NURSING NOTE
"Chief Complaint   Patient presents with    RECHECK     Cirrhosis     Vital signs:  Temp: 98.3  F (36.8  C) Temp src: Oral BP: 138/86 Pulse: 86   Resp: 18 SpO2: 98 %     Height: 182.9 cm (6' 0.01\") Weight: 121.3 kg (267 lb 6.4 oz)  Estimated body mass index is 36.26 kg/m  as calculated from the following:    Height as of this encounter: 1.829 m (6' 0.01\").    Weight as of this encounter: 121.3 kg (267 lb 6.4 oz).      Vandana Huddleston, [facility]  8/30/2023 3:14 PM    "

## 2023-08-30 NOTE — LETTER
8/30/2023         RE: Florian Nowak  [address]  [place] [facility] [address]        Dear Colleague,    Thank you for referring your patient, Florian Nowak, to the [facility] [place]. Please see a copy of my visit note below.    [facility] Return Patient Visit    Date of Visit: 11/29/2022    Reason for referral: Alcohol related liver disease    Subjective: Mr. Nowak is a 42 year old man with a history of AUD, alcohol related cirrhosis c/b refractory ascites, SBP, variceal bleeding, s/p TIPS 9/22/2021, who presents for routine follow up     Interval Events:  - Stopped taking diuretics a few weeks ago, LE swelling is ok but there. Most recently was taking lasix 80/eplerenone 25  - Doing well otherwise, no alcohol    ROS: 14 point ROS negative except for positives noted in HPI.    PMHx:  - AUD  - Alcohol related cirrhosis c/b refractory ascites/SBP, variceal bleeding  - Asthma  - HTN  - Macular degeneration    PSHx:  - Back surgery microdisectomy  - Umbilical hernia repair    FamHx:  No family history of liver disease, liver cancer    SocHx:  Social History     Socioeconomic History    Marital status:      Spouse name: Not on file    Number of children: Not on file    Years of education: Not on file    Highest education level: Not on file   Occupational History    Not on file   Tobacco Use    Smoking status: Former    Smokeless tobacco: Never    Tobacco comments:     College   Substance and Sexual Activity    Alcohol use: Not Currently     Comment: Quit ETOH 10/2020    Drug use: Not Currently    Sexual activity: Not on file   Other Topics Concern    Parent/sibling w/ CABG, MI or angioplasty before 65F 55M? 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   No NSAIDs    Allergies:  Allergies   Allergen Reactions    Bee Venom      Allergic to Bee Stings-swelling.    Ketorolac Tromethamine Other (See Comments)       Objective:  /86 (BP Location: Right arm, Patient Position: Sitting, Cuff Size: Adult Large)   Pulse 86   Temp 98.3  F (36.8  C) (Oral)   Resp 18   Ht 1.829 m (6' 0.01\")   Wt 121.3 kg (267 lb 6.4 oz)   SpO2 98%   BMI 36.26 kg/m    Constitutional: pleasant man in NAD  Eyes: non icteric  Respiratory: Normal respiratory excursion   Abd: Non distended  Skin: No jaundice  Psychiatric: normal mood and orientation    Labs:  Last Comprehensive Metabolic Panel:  Sodium   Date Value Ref Range Status   08/30/2023 141 136 - 145 mmol/L Final     Potassium   Date Value Ref Range Status   08/30/2023 4.3 3.4 - 5.3 mmol/L Final   06/01/2022 4.0 3.5 - 5.0 mmol/L Final     Chloride   Date Value Ref Range Status   08/30/2023 111 (H) 98 - 107 mmol/L Final   06/01/2022 104 98 - 107 mmol/L Final     Carbon Dioxide (CO2)   Date Value Ref Range Status   08/30/2023 21 (L) 22 - 29 mmol/L Final   06/01/2022 24 22 - 31 mmol/L Final     Anion Gap   Date Value Ref Range Status   08/30/2023 9 7 - 15 mmol/L Final   06/01/2022 11 5 - 18 mmol/L Final     Glucose   Date Value Ref Range Status   08/30/2023 99 70 - 99 mg/dL Final   06/01/2022 80 70 - 125 mg/dL Final     GLUCOSE BY METER POCT   Date Value Ref Range " Status   09/14/2022 97 70 - 99 mg/dL Final     Urea Nitrogen   Date Value Ref Range Status   08/30/2023 15.4 6.0 - 20.0 mg/dL Final   06/01/2022 12 8 - 22 mg/dL Final     Creatinine   Date Value Ref Range Status   08/30/2023 0.79 0.67 - 1.17 mg/dL Final     GFR Estimate   Date Value Ref Range Status   08/30/2023 >90 >60 mL/min/1.73m2 Final     Calcium   Date Value Ref Range Status   08/30/2023 10.3 (H) 8.6 - 10.0 mg/dL Final     Bilirubin Total   Date Value Ref Range Status   08/30/2023 1.2 <=1.2 mg/dL Final     Alkaline Phosphatase   Date Value Ref Range Status   08/30/2023 119 40 - 129 U/L Final     ALT   Date Value Ref Range Status   08/30/2023 53 0 - 70 U/L Final     Comment:     Reference intervals for this test were updated on 6/12/2023 to more accurately reflect our healthy population. There may be differences in the flagging of prior results with similar values performed with this method. Interpretation of those prior results can be made in the context of the updated reference intervals.       AST   Date Value Ref Range Status   08/30/2023 62 (H) 0 - 45 U/L Final     Comment:     Reference intervals for this test were updated on 6/12/2023 to more accurately reflect our healthy population. There may be differences in the flagging of prior results with similar values performed with this method. Interpretation of those prior results can be made in the context of the updated reference intervals.       Lab Results   Component Value Date    WBC 8.5 08/18/2021     Lab Results   Component Value Date    RBC 3.26 08/18/2021     Lab Results   Component Value Date    HGB 9.7 08/18/2021     Lab Results   Component Value Date    HCT 29.0 08/18/2021     Lab Results   Component Value Date    MCV 89 08/18/2021     Lab Results   Component Value Date    MCH 29.8 08/18/2021     Lab Results   Component Value Date    MCHC 33.4 08/18/2021     Lab Results   Component Value Date    RDW 14.2 08/18/2021     Lab Results   Component  Value Date     08/18/2021       INR   Date Value Ref Range Status   08/30/2023 1.22 (H) 0.85 - 1.15 Final     Last Comprehensive Metabolic Panel:  Sodium   Date Value Ref Range Status   08/30/2023 141 136 - 145 mmol/L Final     Potassium   Date Value Ref Range Status   08/30/2023 4.3 3.4 - 5.3 mmol/L Final   06/01/2022 4.0 3.5 - 5.0 mmol/L Final     Chloride   Date Value Ref Range Status   08/30/2023 111 (H) 98 - 107 mmol/L Final   06/01/2022 104 98 - 107 mmol/L Final     Carbon Dioxide (CO2)   Date Value Ref Range Status   08/30/2023 21 (L) 22 - 29 mmol/L Final   06/01/2022 24 22 - 31 mmol/L Final     Anion Gap   Date Value Ref Range Status   08/30/2023 9 7 - 15 mmol/L Final   06/01/2022 11 5 - 18 mmol/L Final     Glucose   Date Value Ref Range Status   08/30/2023 99 70 - 99 mg/dL Final   06/01/2022 80 70 - 125 mg/dL Final     GLUCOSE BY METER POCT   Date Value Ref Range Status   09/14/2022 97 70 - 99 mg/dL Final     Urea Nitrogen   Date Value Ref Range Status   08/30/2023 15.4 6.0 - 20.0 mg/dL Final   06/01/2022 12 8 - 22 mg/dL Final     Creatinine   Date Value Ref Range Status   08/30/2023 0.79 0.67 - 1.17 mg/dL Final     GFR Estimate   Date Value Ref Range Status   08/30/2023 >90 >60 mL/min/1.73m2 Final     Calcium   Date Value Ref Range Status   08/30/2023 10.3 (H) 8.6 - 10.0 mg/dL Final     Bilirubin Total   Date Value Ref Range Status   08/30/2023 1.2 <=1.2 mg/dL Final     Alkaline Phosphatase   Date Value Ref Range Status   08/30/2023 119 40 - 129 U/L Final     ALT   Date Value Ref Range Status   08/30/2023 53 0 - 70 U/L Final     Comment:     Reference intervals for this test were updated on 6/12/2023 to more accurately reflect our healthy population. There may be differences in the flagging of prior results with similar values performed with this method. Interpretation of those prior results can be made in the context of the updated reference intervals.       AST   Date Value Ref Range Status    08/30/2023 62 (H) 0 - 45 U/L Final     Comment:     Reference intervals for this test were updated on 6/12/2023 to more accurately reflect our healthy population. There may be differences in the flagging of prior results with similar values performed with this method. Interpretation of those prior results can be made in the context of the updated reference intervals.             Lab Results   Component Value Date    WBC 8.3 09/10/2021     Lab Results   Component Value Date    RBC 2.64 09/10/2021     Lab Results   Component Value Date    HGB 7.9 09/10/2021     Lab Results   Component Value Date    HCT 24.3 09/10/2021     No components found for: MCT  Lab Results   Component Value Date    MCV 92 09/10/2021     Lab Results   Component Value Date    MCH 29.9 09/10/2021     Lab Results   Component Value Date    MCHC 32.5 09/10/2021     Lab Results   Component Value Date    RDW 14.4 09/10/2021     Lab Results   Component Value Date     09/10/2021     MELD 3.0: 9 at 8/30/2023  3:03 PM  MELD-Na: 9 at 8/30/2023  3:03 PM  Calculated from:  Serum Creatinine: 0.79 mg/dL (Using min of 1 mg/dL) at 8/30/2023  3:03 PM  Serum Sodium: 141 mmol/L (Using max of 137 mmol/L) at 8/30/2023  3:03 PM  Total Bilirubin: 1.2 mg/dL at 8/30/2023  3:03 PM  Serum Albumin: 4.1 g/dL (Using max of 3.5 g/dL) at 8/30/2023  3:03 PM  INR(ratio): 1.22 at 8/30/2023  3:03 PM  Age at listing (hypothetical): 43 years  Sex: Male at 8/30/2023  3:03 PM      Imaging: Reviewed in EHR    Endoscopy: Reviewed in EHR    Independently reviewed labs and imaging.      Assessment/Plan: Mr. Nowak is a 43 year old man with a history of AUD, alcohol related cirrhosis c/b refractory ascites/SBP, recurrent variceal bleeding, now s/p TIPS 9/2021    Ascites improved post TIPS, but had issues with umbilical hernia after his TIPS and on 11/11 was unable to reduce and required surgical repair. Underwent recurrent umbilical hernia repair 9/2022, doing well after  this  .  Ascites resolved on imaging, now just some LE edema. Does not feel like it is bad off diuretics    -Ok to hold diuretics, can consider lower dose (lasix 40/eplerenone 25) if needed in the future  -Continue low sodium diet  -Discussed complete sobriety from alcohol, including NA beers  - HCC screening with AFP and RUQ US q6months, TIPS US every 6 months    Orders Placed This Encounter   Procedures    US Abdomen Limited with TIPSS Doppler    CBC with platelets    Comprehensive metabolic panel    INR    AFP tumor marker     RTC 6 months with RUQ US    Cadence Murray MD MS  Hepatology/Liver Transplant  [facility]

## 2023-08-30 NOTE — PROGRESS NOTES
[facility] Clinic Return Patient Visit    Date of Visit: 11/29/2022    Reason for referral: Alcohol related liver disease    Subjective: Mr. Nowak is a 42 year old man with a history of AUD, alcohol related cirrhosis c/b refractory ascites, SBP, variceal bleeding, s/p TIPS 9/22/2021, who presents for routine follow up     Interval Events:  - Stopped taking diuretics a few weeks ago, LE swelling is ok but there. Most recently was taking lasix 80/eplerenone 25  - Doing well otherwise, no alcohol    ROS: 14 point ROS negative except for positives noted in HPI.    PMHx:  - AUD  - Alcohol related cirrhosis c/b refractory ascites/SBP, variceal bleeding  - Asthma  - HTN  - Macular degeneration    PSHx:  - Back surgery microdisectomy  - Umbilical hernia repair    FamHx:  No family history of liver disease, liver cancer    SocHx:  Social History     Socioeconomic History    Marital status:      Spouse name: Not on file    Number of children: Not on file    Years of education: Not on file    Highest education level: Not on file   Occupational History    Not on file   Tobacco Use    Smoking status: Former    Smokeless tobacco: Never    Tobacco comments:     College   Substance and Sexual Activity    Alcohol use: Not Currently     Comment: Quit ETOH 10/2020    Drug use: Not Currently    Sexual activity: Not on file   Other Topics Concern    Parent/sibling w/ CABG, MI or angioplasty before 65F 55M? 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   No NSAIDs    Allergies:  Allergies   Allergen Reactions    Bee Venom      Allergic to Bee Stings-swelling.    Ketorolac Tromethamine Other (See Comments)       Objective:  /86 (BP Location: Right arm, Patient Position: Sitting, Cuff Size: Adult Large)   Pulse 86   Temp 98.3  F (36.8  C) (Oral)   Resp 18   Ht 1.829 m (6' 0.01\")   Wt 121.3 kg (267 lb 6.4 oz)   SpO2 98%   BMI 36.26 kg/m    Constitutional: pleasant man in NAD  Eyes: non icteric  Respiratory: Normal respiratory excursion   Abd: Non distended  Skin: No jaundice  Psychiatric: normal mood and orientation    Labs:  Last Comprehensive Metabolic Panel:  Sodium   Date Value Ref Range Status   08/30/2023 141 136 - 145 mmol/L Final     Potassium   Date Value Ref Range Status   08/30/2023 4.3 3.4 - 5.3 mmol/L Final   06/01/2022 4.0 3.5 - 5.0 mmol/L Final     Chloride   Date Value Ref Range Status   08/30/2023 111 (H) 98 - 107 mmol/L Final   06/01/2022 104 98 - 107 mmol/L Final     Carbon Dioxide (CO2)   Date Value Ref Range Status   08/30/2023 21 (L) 22 - 29 mmol/L Final   06/01/2022 24 22 - 31 mmol/L Final     Anion Gap   Date Value Ref Range Status   08/30/2023 9 7 - 15 mmol/L Final   06/01/2022 11 5 - 18 mmol/L Final     Glucose   Date Value Ref Range Status   08/30/2023 99 70 - 99 mg/dL Final   06/01/2022 80 70 - 125 mg/dL Final     GLUCOSE BY METER POCT   Date Value Ref Range Status   09/14/2022 97 70 - 99 mg/dL Final     Urea Nitrogen   Date Value Ref Range Status   08/30/2023 15.4 6.0 - 20.0 mg/dL Final   06/01/2022 12 8 - 22 mg/dL Final     Creatinine   Date Value Ref Range Status   08/30/2023 0.79 0.67 - 1.17 mg/dL Final     GFR Estimate   Date " Value Ref Range Status   08/30/2023 >90 >60 mL/min/1.73m2 Final     Calcium   Date Value Ref Range Status   08/30/2023 10.3 (H) 8.6 - 10.0 mg/dL Final     Bilirubin Total   Date Value Ref Range Status   08/30/2023 1.2 <=1.2 mg/dL Final     Alkaline Phosphatase   Date Value Ref Range Status   08/30/2023 119 40 - 129 U/L Final     ALT   Date Value Ref Range Status   08/30/2023 53 0 - 70 U/L Final     Comment:     Reference intervals for this test were updated on 6/12/2023 to more accurately reflect our healthy population. There may be differences in the flagging of prior results with similar values performed with this method. Interpretation of those prior results can be made in the context of the updated reference intervals.       AST   Date Value Ref Range Status   08/30/2023 62 (H) 0 - 45 U/L Final     Comment:     Reference intervals for this test were updated on 6/12/2023 to more accurately reflect our healthy population. There may be differences in the flagging of prior results with similar values performed with this method. Interpretation of those prior results can be made in the context of the updated reference intervals.       Lab Results   Component Value Date    WBC 8.5 08/18/2021     Lab Results   Component Value Date    RBC 3.26 08/18/2021     Lab Results   Component Value Date    HGB 9.7 08/18/2021     Lab Results   Component Value Date    HCT 29.0 08/18/2021     Lab Results   Component Value Date    MCV 89 08/18/2021     Lab Results   Component Value Date    MCH 29.8 08/18/2021     Lab Results   Component Value Date    MCHC 33.4 08/18/2021     Lab Results   Component Value Date    RDW 14.2 08/18/2021     Lab Results   Component Value Date     08/18/2021       INR   Date Value Ref Range Status   08/30/2023 1.22 (H) 0.85 - 1.15 Final     Last Comprehensive Metabolic Panel:  Sodium   Date Value Ref Range Status   08/30/2023 141 136 - 145 mmol/L Final     Potassium   Date Value Ref Range Status    08/30/2023 4.3 3.4 - 5.3 mmol/L Final   06/01/2022 4.0 3.5 - 5.0 mmol/L Final     Chloride   Date Value Ref Range Status   08/30/2023 111 (H) 98 - 107 mmol/L Final   06/01/2022 104 98 - 107 mmol/L Final     Carbon Dioxide (CO2)   Date Value Ref Range Status   08/30/2023 21 (L) 22 - 29 mmol/L Final   06/01/2022 24 22 - 31 mmol/L Final     Anion Gap   Date Value Ref Range Status   08/30/2023 9 7 - 15 mmol/L Final   06/01/2022 11 5 - 18 mmol/L Final     Glucose   Date Value Ref Range Status   08/30/2023 99 70 - 99 mg/dL Final   06/01/2022 80 70 - 125 mg/dL Final     GLUCOSE BY METER POCT   Date Value Ref Range Status   09/14/2022 97 70 - 99 mg/dL Final     Urea Nitrogen   Date Value Ref Range Status   08/30/2023 15.4 6.0 - 20.0 mg/dL Final   06/01/2022 12 8 - 22 mg/dL Final     Creatinine   Date Value Ref Range Status   08/30/2023 0.79 0.67 - 1.17 mg/dL Final     GFR Estimate   Date Value Ref Range Status   08/30/2023 >90 >60 mL/min/1.73m2 Final     Calcium   Date Value Ref Range Status   08/30/2023 10.3 (H) 8.6 - 10.0 mg/dL Final     Bilirubin Total   Date Value Ref Range Status   08/30/2023 1.2 <=1.2 mg/dL Final     Alkaline Phosphatase   Date Value Ref Range Status   08/30/2023 119 40 - 129 U/L Final     ALT   Date Value Ref Range Status   08/30/2023 53 0 - 70 U/L Final     Comment:     Reference intervals for this test were updated on 6/12/2023 to more accurately reflect our healthy population. There may be differences in the flagging of prior results with similar values performed with this method. Interpretation of those prior results can be made in the context of the updated reference intervals.       AST   Date Value Ref Range Status   08/30/2023 62 (H) 0 - 45 U/L Final     Comment:     Reference intervals for this test were updated on 6/12/2023 to more accurately reflect our healthy population. There may be differences in the flagging of prior results with similar values performed with this method.  Interpretation of those prior results can be made in the context of the updated reference intervals.             Lab Results   Component Value Date    WBC 8.3 09/10/2021     Lab Results   Component Value Date    RBC 2.64 09/10/2021     Lab Results   Component Value Date    HGB 7.9 09/10/2021     Lab Results   Component Value Date    HCT 24.3 09/10/2021     No components found for: MCT  Lab Results   Component Value Date    MCV 92 09/10/2021     Lab Results   Component Value Date    MCH 29.9 09/10/2021     Lab Results   Component Value Date    MCHC 32.5 09/10/2021     Lab Results   Component Value Date    RDW 14.4 09/10/2021     Lab Results   Component Value Date     09/10/2021     MELD 3.0: 9 at 8/30/2023  3:03 PM  MELD-Na: 9 at 8/30/2023  3:03 PM  Calculated from:  Serum Creatinine: 0.79 mg/dL (Using min of 1 mg/dL) at 8/30/2023  3:03 PM  Serum Sodium: 141 mmol/L (Using max of 137 mmol/L) at 8/30/2023  3:03 PM  Total Bilirubin: 1.2 mg/dL at 8/30/2023  3:03 PM  Serum Albumin: 4.1 g/dL (Using max of 3.5 g/dL) at 8/30/2023  3:03 PM  INR(ratio): 1.22 at 8/30/2023  3:03 PM  Age at listing (hypothetical): 43 years  Sex: Male at 8/30/2023  3:03 PM      Imaging: Reviewed in EHR    Endoscopy: Reviewed in EHR    Independently reviewed labs and imaging.      Assessment/Plan: Mr. Nowak is a 43 year old man with a history of AUD, alcohol related cirrhosis c/b refractory ascites/SBP, recurrent variceal bleeding, now s/p TIPS 9/2021    Ascites improved post TIPS, but had issues with umbilical hernia after his TIPS and on 11/11 was unable to reduce and required surgical repair. Underwent recurrent umbilical hernia repair 9/2022, doing well after this  .  Ascites resolved on imaging, now just some LE edema. Does not feel like it is bad off diuretics    -Ok to hold diuretics, can consider lower dose (lasix 40/eplerenone 25) if needed in the future  -Continue low sodium diet  -Discussed complete sobriety from alcohol,  including NA beers  - HCC screening with AFP and RUQ US q6months, TIPS US every 6 months    Orders Placed This Encounter   Procedures    US Abdomen Limited with TIPSS Doppler    CBC with platelets    Comprehensive metabolic panel    INR    AFP tumor marker     RTC 6 months with RUQ US    Cadence Murray MD MS  Hepatology/Liver Transplant  [facility]

## 2023-11-07 DIAGNOSIS — H35.54 PATTERN DYSTROPHY OF MACULA: Primary | ICD-10-CM

## 2023-11-07 DIAGNOSIS — H35.30 ARMD (AGE-RELATED MACULAR DEGENERATION), BILATERAL: ICD-10-CM

## 2023-11-14 ENCOUNTER — OFFICE VISIT (OUTPATIENT)
Dept: OPHTHALMOLOGY | Facility: CLINIC | Age: 43
End: 2023-11-14
Attending: OPHTHALMOLOGY
Payer: COMMERCIAL

## 2023-11-14 DIAGNOSIS — H35.30 ARMD (AGE-RELATED MACULAR DEGENERATION), BILATERAL: ICD-10-CM

## 2023-11-14 DIAGNOSIS — H35.54 PATTERN DYSTROPHY OF MACULA: Primary | ICD-10-CM

## 2023-11-14 DIAGNOSIS — H35.3130 BILATERAL NONEXUDATIVE AGE-RELATED MACULAR DEGENERATION, UNSPECIFIED STAGE: ICD-10-CM

## 2023-11-14 PROCEDURE — 99214 OFFICE O/P EST MOD 30 MIN: CPT | Performed by: OPHTHALMOLOGY

## 2023-11-14 PROCEDURE — 92134 CPTRZ OPH DX IMG PST SGM RTA: CPT | Performed by: OPHTHALMOLOGY

## 2023-11-14 PROCEDURE — 92250 FUNDUS PHOTOGRAPHY W/I&R: CPT | Performed by: OPHTHALMOLOGY

## 2023-11-14 PROCEDURE — 99207 FUNDUS AUTOFLUORESCENCE IMAGE (FAF) OU (BOTH EYES): CPT | Mod: 26 | Performed by: OPHTHALMOLOGY

## 2023-11-14 RX ORDER — DEXTROAMPHETAMINE SACCHARATE, AMPHETAMINE ASPARTATE MONOHYDRATE, DEXTROAMPHETAMINE SULFATE AND AMPHETAMINE SULFATE 5; 5; 5; 5 MG/1; MG/1; MG/1; MG/1
20 CAPSULE, EXTENDED RELEASE ORAL
COMMUNITY
Start: 2023-10-17 | End: 2024-01-15

## 2023-11-14 ASSESSMENT — CUP TO DISC RATIO
OD_RATIO: 0.1
OS_RATIO: 0.1

## 2023-11-14 ASSESSMENT — SLIT LAMP EXAM - LIDS: COMMENTS: NORMAL

## 2023-11-14 ASSESSMENT — CONF VISUAL FIELD
OD_INFERIOR_NASAL_RESTRICTION: 0
OD_INFERIOR_TEMPORAL_RESTRICTION: 0
OD_SUPERIOR_TEMPORAL_RESTRICTION: 0
OD_NORMAL: 1
OS_INFERIOR_TEMPORAL_RESTRICTION: 0
OS_SUPERIOR_TEMPORAL_RESTRICTION: 0
OS_INFERIOR_NASAL_RESTRICTION: 0
OD_SUPERIOR_NASAL_RESTRICTION: 0
OS_NORMAL: 1
OS_SUPERIOR_NASAL_RESTRICTION: 0

## 2023-11-14 ASSESSMENT — TONOMETRY
OD_IOP_MMHG: 15
IOP_METHOD: TONOPEN
OS_IOP_MMHG: 14

## 2023-11-14 ASSESSMENT — VISUAL ACUITY
OD_SC+: -2
OS_SC: 20/20
METHOD: SNELLEN - LINEAR
OD_SC: 20/20

## 2023-11-14 ASSESSMENT — EXTERNAL EXAM - RIGHT EYE: OD_EXAM: NORMAL

## 2023-11-14 ASSESSMENT — EXTERNAL EXAM - LEFT EYE: OS_EXAM: NORMAL

## 2023-11-14 NOTE — NURSING NOTE
"Chief Complaints and History of Present Illnesses   Patient presents with    Follow Up     6 month follow upTorpedo maculopathy, each eye      Chief Complaint(s) and History of Present Illness(es)       Follow Up              Comments: 6 month follow upTorpedo maculopathy, each eye               Comments    Pt states vision is about the same as last visit. Pt needing stronger OTC reading glasses for up close.  Pt prediabetic, does not take blood sugars.  No results found for: \"A1C\"    DONALD Nelson November 14, 2023 7:42 AM                         "

## 2023-11-14 NOTE — PROGRESS NOTES
CC -   Retinal evaluation    INTERVAL HISTORY - no change in vision    HPI -   Florian Nowak is a  43 year old year-old patient presenting for retinal consultation regarding dystrophy. For the past 8 years has been told by optometrist that he should be evaluated by ophthalmologist. Seen at [facility] in [place] 6 months ago and had extensive work up (including FA). Diagnosed with AMD. Recommended [id number] exams. Here for [id number] exam and 2nd opinion.     He reports he is visually asymptomatic. No distortion of the vision, no flashes, no floaters. No distortion on amsler grid.    PMHx: Cirrhosis (from alcohol use), portal hypertension s/p TIPS (1 yr ago), HTN,   PSHx: TIPS, Hernia repair   FHx: MGM with glaucoma, no h/o macular degeneration, no h/o retinal dystrophies  Meds: Furosemide, eplerenone (6months ago), AREDS 2 (6 months ago)    PAST OCULAR SURGERY  LASIK (2-3 years ago by Dr. Espino) - near sighted per history     RETINAL IMAGING:  OCT 5/9/2023 and 11/14/23  OD - temporal drusen, subretinal coalescing hyperreflective deposits, no subretinal fluid or intraretinal fluid, PHF attached. Not subfoveal  OS - temporal drusen, subretinal coalescing hyperreflective deposits, no subretinal fluid or intraretinal fluid, PHF attached. Not subfoveal    AF 11/09/22 and 5/9/2023 and 11/14/23  Each eye - mixed hyper/hypoAF stippled pattern with more hypoAF closer to fovea and predominantly hyperAF more peripherally; fairly stable compared to 2022        Optos 11/09/22   right eye - area of hypopigmentation temporal to fovea otherwise normal   left eye - area of hypopigmentation temporal to fovea otherwise normal     FA/ICGA 11/9/2  each eye - CNVM absent; lesions localized on temporal hypovascular choroid    ASSESSMENT & PLAN    #Torpedo maculopathy, each eye   - differential includes ARMD (non-exudative) vs  malatia levintense though unlikely vs reticular dystrophy  - no history of nyctalopia  - Patient asymptomatic,  denies any history of other supplements  - no indication to take AREDS2 vitamin - hepatologist did clear him for taking this given the concern for large amount of zinc/copper in these vitamins. Okay to stop.  - Patient is non smoker  - Encouraged him to use amsler grid [id number] to check for distortion and to call if new symptoms start    #h/o high myopia  - s/p LASIK (pre lasik prescription unknown)   - Retina detachment precautions were discussed with the patient (presence or increased in flashes, floaters or a curtain in the visual field) and was asked to return if any of the those occur    return to clinic: 12 months VTD, [place] FAF (both 30 and 55 degrees), OCT Macula         Complete documentation of historical and exam elements from today's encounter can be found in the full encounter summary report (not reduplicated in this progress note). I personally obtained the chief complaint(s) and history of present illness.  I confirmed and edited as necessary the review of systems, past medical/surgical history, family history, social history, and examination findings as documented by others; and I examined the patient myself. I personally reviewed the relevant tests, images, and reports as documented above. I formulated and edited as necessary the assessment and plan and discussed the findings and management plan with the patient and family.     Juan Antonio Tobar MD, PhD

## 2023-11-25 ENCOUNTER — HEALTH MAINTENANCE LETTER (OUTPATIENT)
Age: 43
End: 2023-11-25

## 2024-11-04 DIAGNOSIS — H35.30 ARMD (AGE-RELATED MACULAR DEGENERATION), BILATERAL: Primary | ICD-10-CM

## 2025-01-04 ENCOUNTER — HEALTH MAINTENANCE LETTER (OUTPATIENT)
Age: 45
End: 2025-01-04

## 2025-02-04 DIAGNOSIS — K70.31 ALCOHOLIC CIRRHOSIS OF LIVER WITH ASCITES (H): Primary | ICD-10-CM

## 2025-02-11 ENCOUNTER — MYC MEDICAL ADVICE (OUTPATIENT)
Dept: GASTROENTEROLOGY | Facility: CLINIC | Age: 45
End: 2025-02-11
Payer: COMMERCIAL

## 2025-02-11 DIAGNOSIS — K70.31 ALCOHOLIC CIRRHOSIS OF LIVER WITH ASCITES (H): Primary | ICD-10-CM

## 2025-02-11 DIAGNOSIS — Z95.828 S/P TIPS (TRANSJUGULAR INTRAHEPATIC PORTOSYSTEMIC SHUNT): ICD-10-CM

## 2025-02-18 ENCOUNTER — HOSPITAL ENCOUNTER (OUTPATIENT)
Dept: ULTRASOUND IMAGING | Facility: HOSPITAL | Age: 45
Discharge: HOME | End: 2025-02-18
Attending: STUDENT IN AN ORGANIZED HEALTH CARE EDUCATION/TRAINING PROGRAM
Payer: COMMERCIAL

## 2025-02-18 ENCOUNTER — LAB (OUTPATIENT)
Dept: LAB | Facility: HOSPITAL | Age: 45
End: 2025-02-18
Attending: STUDENT IN AN ORGANIZED HEALTH CARE EDUCATION/TRAINING PROGRAM
Payer: COMMERCIAL

## 2025-02-18 DIAGNOSIS — K70.31 ALCOHOLIC CIRRHOSIS OF LIVER WITH ASCITES (H): ICD-10-CM

## 2025-02-18 DIAGNOSIS — Z95.828 S/P TIPS (TRANSJUGULAR INTRAHEPATIC PORTOSYSTEMIC SHUNT): ICD-10-CM

## 2025-02-18 LAB
AFP SERPL-MCNC: 2.5 NG/ML
ALBUMIN SERPL BCG-MCNC: 3.9 G/DL (ref 3.5–5.2)
ALP SERPL-CCNC: 103 U/L (ref 40–150)
ALT SERPL W P-5'-P-CCNC: 66 U/L (ref 0–70)
ANION GAP SERPL CALCULATED.3IONS-SCNC: 6 MMOL/L (ref 7–15)
AST SERPL W P-5'-P-CCNC: 66 U/L (ref 0–45)
BILIRUB DIRECT SERPL-MCNC: 0.46 MG/DL (ref 0–0.3)
BILIRUB SERPL-MCNC: 1.1 MG/DL
BUN SERPL-MCNC: 14.9 MG/DL (ref 6–20)
CALCIUM SERPL-MCNC: 9.9 MG/DL (ref 8.8–10.4)
CHLORIDE SERPL-SCNC: 113 MMOL/L (ref 98–107)
CREAT SERPL-MCNC: 0.79 MG/DL (ref 0.67–1.17)
EGFRCR SERPLBLD CKD-EPI 2021: >90 ML/MIN/1.73M2
ERYTHROCYTE [DISTWIDTH] IN BLOOD BY AUTOMATED COUNT: 14.1 % (ref 10–15)
GLUCOSE SERPL-MCNC: 94 MG/DL (ref 70–99)
HCO3 SERPL-SCNC: 25 MMOL/L (ref 22–29)
HCT VFR BLD AUTO: 40.6 % (ref 40–53)
HGB BLD-MCNC: 14.2 G/DL (ref 13.3–17.7)
INR PPP: 1.11 (ref 0.85–1.15)
MCH RBC QN AUTO: 32.4 PG (ref 26.5–33)
MCHC RBC AUTO-ENTMCNC: 35 G/DL (ref 31.5–36.5)
MCV RBC AUTO: 93 FL (ref 78–100)
PLATELET # BLD AUTO: 113 10E3/UL (ref 150–450)
POTASSIUM SERPL-SCNC: 4.4 MMOL/L (ref 3.4–5.3)
PROT SERPL-MCNC: 6.7 G/DL (ref 6.4–8.3)
RBC # BLD AUTO: 4.38 10E6/UL (ref 4.4–5.9)
SODIUM SERPL-SCNC: 144 MMOL/L (ref 135–145)
WBC # BLD AUTO: 5.4 10E3/UL (ref 4–11)

## 2025-02-18 PROCEDURE — 85014 HEMATOCRIT: CPT

## 2025-02-18 PROCEDURE — 80053 COMPREHEN METABOLIC PANEL: CPT

## 2025-02-18 PROCEDURE — 82248 BILIRUBIN DIRECT: CPT

## 2025-02-18 PROCEDURE — 85041 AUTOMATED RBC COUNT: CPT

## 2025-02-18 PROCEDURE — 80048 BASIC METABOLIC PNL TOTAL CA: CPT

## 2025-02-18 PROCEDURE — 85610 PROTHROMBIN TIME: CPT

## 2025-02-18 PROCEDURE — 82105 ALPHA-FETOPROTEIN SERUM: CPT

## 2025-02-18 PROCEDURE — 36415 COLL VENOUS BLD VENIPUNCTURE: CPT

## 2025-02-18 PROCEDURE — 93975 VASCULAR STUDY: CPT

## 2025-02-19 ENCOUNTER — OFFICE VISIT (OUTPATIENT)
Dept: GASTROENTEROLOGY | Facility: CLINIC | Age: 45
End: 2025-02-19
Attending: STUDENT IN AN ORGANIZED HEALTH CARE EDUCATION/TRAINING PROGRAM
Payer: COMMERCIAL

## 2025-02-19 VITALS
HEART RATE: 81 BPM | DIASTOLIC BLOOD PRESSURE: 97 MMHG | RESPIRATION RATE: 20 BRPM | TEMPERATURE: 98 F | OXYGEN SATURATION: 100 % | WEIGHT: 278.3 LBS | BODY MASS INDEX: 37.74 KG/M2 | SYSTOLIC BLOOD PRESSURE: 157 MMHG

## 2025-02-19 DIAGNOSIS — K70.31 ALCOHOLIC CIRRHOSIS OF LIVER WITH ASCITES (H): Primary | ICD-10-CM

## 2025-02-19 DIAGNOSIS — R60.0 LEG EDEMA: ICD-10-CM

## 2025-02-19 DIAGNOSIS — Z86.0100 HISTORY OF COLONIC POLYPS: ICD-10-CM

## 2025-02-19 PROCEDURE — 99214 OFFICE O/P EST MOD 30 MIN: CPT | Performed by: STUDENT IN AN ORGANIZED HEALTH CARE EDUCATION/TRAINING PROGRAM

## 2025-02-19 PROCEDURE — G2211 COMPLEX E/M VISIT ADD ON: HCPCS | Performed by: STUDENT IN AN ORGANIZED HEALTH CARE EDUCATION/TRAINING PROGRAM

## 2025-02-19 PROCEDURE — 99213 OFFICE O/P EST LOW 20 MIN: CPT | Performed by: STUDENT IN AN ORGANIZED HEALTH CARE EDUCATION/TRAINING PROGRAM

## 2025-02-19 RX ORDER — FUROSEMIDE 20 MG/1
20 TABLET ORAL DAILY
Qty: 90 TABLET | Refills: 3 | Status: SHIPPED | OUTPATIENT
Start: 2025-02-19 | End: 2026-02-14

## 2025-02-19 RX ORDER — METOPROLOL TARTRATE 50 MG
50 TABLET ORAL DAILY
COMMUNITY

## 2025-02-19 RX ORDER — DEXTROAMPHETAMINE SACCHARATE, AMPHETAMINE ASPARTATE, DEXTROAMPHETAMINE SULFATE AND AMPHETAMINE SULFATE 7.5; 7.5; 7.5; 7.5 MG/1; MG/1; MG/1; MG/1
40 TABLET ORAL 2 TIMES DAILY
COMMUNITY

## 2025-02-19 NOTE — NURSING NOTE
"Chief Complaint   Patient presents with    Follow Up     Return liver     Vital signs:  Temp: 98  F (36.7  C) Temp src: Oral BP: (!) 157/97 Pulse: 81   Resp: 20 SpO2: 100 %       Weight: 126.2 kg (278 lb 4.8 oz)  Estimated body mass index is 37.74 kg/m  as calculated from the following:    Height as of 8/30/23: 1.829 m (6' 0.01\").    Weight as of this encounter: 126.2 kg (278 lb 4.8 oz).      Ti Barrios RN on 2/19/2025 at 12:30 PM    "

## 2025-02-19 NOTE — PROGRESS NOTES
[facility] Return Patient Visit    Date of Visit: February 19, 2025    Reason for referral: Alcohol related liver disease    Subjective: Mr. Nowak is a 44 year old man with a history of AUD, alcohol related cirrhosis c/b refractory ascites, SBP, variceal bleeding, s/p TIPS 9/22/2021, who presents for routine follow up     Interval Events:  - A few months ago started getting cloudy urine that is intermittent, and swelling in his ankles.  Has not taken diuretics for some time  - Doing well otherwise, no alcohol  - Was on GLP-1 agonist for little bit, lost 20 pounds with this, now off  ROS: 14 point ROS negative except for positives noted in HPI.    PMHx:  - AUD in sustained remission  - Alcohol related cirrhosis c/b refractory ascites/SBP, variceal bleeding  - Asthma  - HTN  - Macular degeneration    PSHx:  - Back surgery microdisectomy  - Umbilical hernia repair    FamHx:  No family history of liver disease, liver cancer    SocHx:  Social History     Socioeconomic History    Marital status:      Spouse name: Not on file    Number of children: Not on file    Years of education: Not on file    Highest education level: Not on file   Occupational History    Not on file   Tobacco Use    Smoking status: Former    Smokeless tobacco: Never    Tobacco comments:     College   Substance and Sexual Activity    Alcohol use: Not Currently     Comment: Quit ETOH 10/2020    Drug use: Not Currently    Sexual activity: Not on file   Other Topics Concern    Parent/sibling w/ CABG, MI or angioplasty before 65F 55M? 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      furosemide (LASIX) 20 MG tablet Take 1 tablet (20 mg) by mouth daily. 90 tablet 3    melatonin 3 MG CAPS Take by mouth.      metoprolol tartrate (LOPRESSOR) 50 MG tablet Take 50 mg by mouth daily.      tadalafil (CIALIS) 20 MG tablet Take 10 mg by mouth daily as needed      eplerenone (INSPRA) 25 MG tablet Take 1 tablet (25 mg) by mouth daily (Patient not taking: Reported on 2/19/2025) 90 tablet 3    furosemide (LASIX) 40 MG tablet Take 2 tablets (80 mg) by mouth daily for 360 days (Patient not taking: Reported on 2/19/2025) 180 tablet 3    furosemide (LASIX) 40 MG tablet Take 2 tablets (80 mg) by mouth 2 times daily (Patient not taking: Reported on 2/19/2025) 360 tablet 0     No current facility-administered medications for this visit.   No NSAIDs    Allergies:  Allergies   Allergen Reactions    Bee Venom      Allergic to Bee Stings-swelling.    Ketorolac Tromethamine Other (See Comments)       Objective:  BP (!) 157/97 (BP Location: Left arm, Patient Position: Chair, Cuff Size: Adult Regular)   Pulse 81   Temp 98  F (36.7  C) (Oral)   Resp 20   Wt 126.2 kg (278 lb 4.8 oz)   SpO2 100%   BMI 37.74 kg/m    Constitutional: pleasant man in NAD  Eyes: non icteric  Respiratory: Normal respiratory excursion   Abd: Non distended  Skin: No jaundice  Psychiatric: normal mood and orientation    Labs:  Last Comprehensive Metabolic Panel:  Sodium   Date Value Ref Range Status   02/18/2025 144 135 - 145 mmol/L Final     Potassium   Date Value Ref Range Status   02/18/2025 4.4 3.4 - 5.3 mmol/L Final   06/01/2022 4.0 3.5 - 5.0 mmol/L Final     Chloride   Date Value Ref Range Status   02/18/2025 113 (H) 98 - 107 mmol/L Final   06/01/2022 104 98 - 107 mmol/L Final     Carbon  Dioxide (CO2)   Date Value Ref Range Status   02/18/2025 25 22 - 29 mmol/L Final   06/01/2022 24 22 - 31 mmol/L Final     Anion Gap   Date Value Ref Range Status   02/18/2025 6 (L) 7 - 15 mmol/L Final   06/01/2022 11 5 - 18 mmol/L Final     Glucose   Date Value Ref Range Status   02/18/2025 94 70 - 99 mg/dL Final   06/01/2022 80 70 - 125 mg/dL Final     GLUCOSE BY METER POCT   Date Value Ref Range Status   09/14/2022 97 70 - 99 mg/dL Final     Urea Nitrogen   Date Value Ref Range Status   02/18/2025 14.9 6.0 - 20.0 mg/dL Final   06/01/2022 12 8 - 22 mg/dL Final     Creatinine   Date Value Ref Range Status   02/18/2025 0.79 0.67 - 1.17 mg/dL Final     GFR Estimate   Date Value Ref Range Status   02/18/2025 >90 >60 mL/min/1.73m2 Final     Comment:     eGFR calculated using 2021 CKD-EPI equation.     Calcium   Date Value Ref Range Status   02/18/2025 9.9 8.8 - 10.4 mg/dL Final     Bilirubin Total   Date Value Ref Range Status   02/18/2025 1.1 <=1.2 mg/dL Final     Alkaline Phosphatase   Date Value Ref Range Status   02/18/2025 103 40 - 150 U/L Final     ALT   Date Value Ref Range Status   02/18/2025 66 0 - 70 U/L Final     AST   Date Value Ref Range Status   02/18/2025 66 (H) 0 - 45 U/L Final       Lab Results   Component Value Date    WBC 8.5 08/18/2021     Lab Results   Component Value Date    RBC 3.26 08/18/2021     Lab Results   Component Value Date    HGB 9.7 08/18/2021     Lab Results   Component Value Date    HCT 29.0 08/18/2021     Lab Results   Component Value Date    MCV 89 08/18/2021     Lab Results   Component Value Date    MCH 29.8 08/18/2021     Lab Results   Component Value Date    MCHC 33.4 08/18/2021     Lab Results   Component Value Date    RDW 14.2 08/18/2021     Lab Results   Component Value Date     08/18/2021       INR   Date Value Ref Range Status   02/18/2025 1.11 0.85 - 1.15 Final     Last Comprehensive Metabolic Panel:  Sodium   Date Value Ref Range Status   02/18/2025 144 135 - 145  mmol/L Final     Potassium   Date Value Ref Range Status   02/18/2025 4.4 3.4 - 5.3 mmol/L Final   06/01/2022 4.0 3.5 - 5.0 mmol/L Final     Chloride   Date Value Ref Range Status   02/18/2025 113 (H) 98 - 107 mmol/L Final   06/01/2022 104 98 - 107 mmol/L Final     Carbon Dioxide (CO2)   Date Value Ref Range Status   02/18/2025 25 22 - 29 mmol/L Final   06/01/2022 24 22 - 31 mmol/L Final     Anion Gap   Date Value Ref Range Status   02/18/2025 6 (L) 7 - 15 mmol/L Final   06/01/2022 11 5 - 18 mmol/L Final     Glucose   Date Value Ref Range Status   02/18/2025 94 70 - 99 mg/dL Final   06/01/2022 80 70 - 125 mg/dL Final     GLUCOSE BY METER POCT   Date Value Ref Range Status   09/14/2022 97 70 - 99 mg/dL Final     Urea Nitrogen   Date Value Ref Range Status   02/18/2025 14.9 6.0 - 20.0 mg/dL Final   06/01/2022 12 8 - 22 mg/dL Final     Creatinine   Date Value Ref Range Status   02/18/2025 0.79 0.67 - 1.17 mg/dL Final     GFR Estimate   Date Value Ref Range Status   02/18/2025 >90 >60 mL/min/1.73m2 Final     Comment:     eGFR calculated using 2021 CKD-EPI equation.     Calcium   Date Value Ref Range Status   02/18/2025 9.9 8.8 - 10.4 mg/dL Final     Bilirubin Total   Date Value Ref Range Status   02/18/2025 1.1 <=1.2 mg/dL Final     Alkaline Phosphatase   Date Value Ref Range Status   02/18/2025 103 40 - 150 U/L Final     ALT   Date Value Ref Range Status   02/18/2025 66 0 - 70 U/L Final     AST   Date Value Ref Range Status   02/18/2025 66 (H) 0 - 45 U/L Final             Lab Results   Component Value Date    WBC 8.3 09/10/2021     Lab Results   Component Value Date    RBC 2.64 09/10/2021     Lab Results   Component Value Date    HGB 7.9 09/10/2021     Lab Results   Component Value Date    HCT 24.3 09/10/2021     No components found for: MCT  Lab Results   Component Value Date    MCV 92 09/10/2021     Lab Results   Component Value Date    MCH 29.9 09/10/2021     Lab Results   Component Value Date    MCHC 32.5  09/10/2021     Lab Results   Component Value Date    RDW 14.4 09/10/2021     Lab Results   Component Value Date     09/10/2021     MELD 3.0: 7 at 2/18/2025 11:46 AM  MELD-Na: 8 at 2/18/2025 11:46 AM  Calculated from:  Serum Creatinine: 0.79 mg/dL (Using min of 1 mg/dL) at 2/18/2025 11:46 AM  Serum Sodium: 144 mmol/L (Using max of 137 mmol/L) at 2/18/2025 11:46 AM  Total Bilirubin: 1.1 mg/dL at 2/18/2025 11:46 AM  Serum Albumin: 3.9 g/dL (Using max of 3.5 g/dL) at 2/18/2025 11:46 AM  INR(ratio): 1.11 at 2/18/2025 11:46 AM  Age at listing (hypothetical): 44 years  Sex: Male at 2/18/2025 11:46 AM      Imaging: Reviewed in EHR    Endoscopy: Reviewed in EHR    Independently reviewed labs and imaging.      Assessment/Plan: Mr. Nowak is a 44 year old man with a history of AUD, alcohol related cirrhosis c/b refractory ascites/SBP, recurrent variceal bleeding, now s/p TIPS 9/2021    Ascites improved post TIPS, but had issues with umbilical hernia after his TIPS and on 11/11 was unable to reduce and required surgical repair. Underwent recurrent umbilical hernia repair 9/2022, doing well after this.  Has some elevated velocities in his TIPS ultrasound but overall feel it is working well    He has not had ascites on imaging.  Having some lower extremity edema.    - Lasix 20 mg daily as needed for lower extremity edema  -Continue low sodium diet  -Discussed complete sobriety from alcohol, including NA beers  - HCC screening with AFP and RUQ US q6months, TIPS US every 6 months  -Ordered surveillance colonoscopy given history of polyps    Orders Placed This Encounter   Procedures    US Abdomen Limited    AFP tumor marker    BASIC METABOLIC PANEL    CBC with platelets    Hepatic function panel    INR    Colonoscopy Screening  Referral     RTC 6 months with RUQ US    Cadence Murray MD MS  Hepatology/Liver Transplant  [facility]    The longitudinal plan of care for the diagnosis(es)/condition(s) as  documented were addressed during this visit. Due to the added complexity in care, I will continue to support Christiano in the subsequent management and with ongoing continuity of care.

## 2025-02-19 NOTE — LETTER
2/19/2025      Florian Nowak  [address]  [facility] [address]      Dear Colleague,    Thank you for referring your patient, Florian Nowak, to the [facility] [place]. Please see a copy of my visit note below.    [facility] Return Patient Visit    Date of Visit: February 19, 2025    Reason for referral: Alcohol related liver disease    Subjective: Mr. Nowak is a 44 year old man with a history of AUD, alcohol related cirrhosis c/b refractory ascites, SBP, variceal bleeding, s/p TIPS 9/22/2021, who presents for routine follow up     Interval Events:  - A few months ago started getting cloudy urine that is intermittent, and swelling in his ankles.  Has not taken diuretics for some time  - Doing well otherwise, no alcohol  - Was on GLP-1 agonist for little bit, lost 20 pounds with this, now off  ROS: 14 point ROS negative except for positives noted in HPI.    PMHx:  - AUD in sustained remission  - Alcohol related cirrhosis c/b refractory ascites/SBP, variceal bleeding  - Asthma  - HTN  - Macular degeneration    PSHx:  - Back surgery microdisectomy  - Umbilical hernia repair    FamHx:  No family history of liver disease, liver cancer    SocHx:  Social History     Socioeconomic History     Marital status:      Spouse name: Not on file     Number of children: Not on file     Years of education: Not on file     Highest education level: Not on file   Occupational History     Not on file   Tobacco Use     Smoking status: Former     Smokeless tobacco: Never     Tobacco comments:     College   Substance and Sexual Activity     Alcohol use: Not Currently     Comment: Quit ETOH 10/2020     Drug use: Not Currently     Sexual activity: Not on file   Other Topics Concern     Parent/sibling w/ CABG, MI or angioplasty before 65F 55M? 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       furosemide (LASIX) 20 MG tablet Take 1 tablet (20 mg) by mouth daily. 90 tablet 3     melatonin 3 MG CAPS Take by mouth.       metoprolol tartrate (LOPRESSOR) 50 MG tablet Take 50 mg by mouth daily.       tadalafil (CIALIS) 20 MG tablet Take 10 mg by mouth daily as needed       eplerenone (INSPRA) 25 MG tablet Take 1 tablet (25 mg) by mouth daily (Patient not taking: Reported on 2/19/2025) 90 tablet 3     furosemide (LASIX) 40 MG tablet Take 2 tablets (80 mg) by mouth daily for 360 days (Patient not taking: Reported on 2/19/2025) 180 tablet 3     furosemide (LASIX) 40 MG tablet Take 2 tablets (80 mg) by mouth 2 times daily (Patient not taking: Reported on 2/19/2025) 360 tablet 0     No current facility-administered medications for this visit.   No NSAIDs    Allergies:  Allergies   Allergen Reactions     Bee Venom      Allergic to Bee Stings-swelling.     Ketorolac Tromethamine Other (See Comments)       Objective:  BP (!) 157/97 (BP Location: Left arm, Patient Position: Chair, Cuff Size: Adult Regular)   Pulse 81   Temp 98  F (36.7  C) (Oral)   Resp 20   Wt 126.2 kg (278 lb 4.8 oz)   SpO2 100%   BMI 37.74 kg/m    Constitutional: pleasant man in NAD  Eyes: non icteric  Respiratory: Normal respiratory excursion   Abd: Non distended  Skin: No jaundice  Psychiatric: normal mood and orientation    Labs:  Last Comprehensive Metabolic Panel:  Sodium   Date Value Ref Range Status   02/18/2025 144 135  - 145 mmol/L Final     Potassium   Date Value Ref Range Status   02/18/2025 4.4 3.4 - 5.3 mmol/L Final   06/01/2022 4.0 3.5 - 5.0 mmol/L Final     Chloride   Date Value Ref Range Status   02/18/2025 113 (H) 98 - 107 mmol/L Final   06/01/2022 104 98 - 107 mmol/L Final     Carbon Dioxide (CO2)   Date Value Ref Range Status   02/18/2025 25 22 - 29 mmol/L Final   06/01/2022 24 22 - 31 mmol/L Final     Anion Gap   Date Value Ref Range Status   02/18/2025 6 (L) 7 - 15 mmol/L Final   06/01/2022 11 5 - 18 mmol/L Final     Glucose   Date Value Ref Range Status   02/18/2025 94 70 - 99 mg/dL Final   06/01/2022 80 70 - 125 mg/dL Final     GLUCOSE BY METER POCT   Date Value Ref Range Status   09/14/2022 97 70 - 99 mg/dL Final     Urea Nitrogen   Date Value Ref Range Status   02/18/2025 14.9 6.0 - 20.0 mg/dL Final   06/01/2022 12 8 - 22 mg/dL Final     Creatinine   Date Value Ref Range Status   02/18/2025 0.79 0.67 - 1.17 mg/dL Final     GFR Estimate   Date Value Ref Range Status   02/18/2025 >90 >60 mL/min/1.73m2 Final     Comment:     eGFR calculated using 2021 CKD-EPI equation.     Calcium   Date Value Ref Range Status   02/18/2025 9.9 8.8 - 10.4 mg/dL Final     Bilirubin Total   Date Value Ref Range Status   02/18/2025 1.1 <=1.2 mg/dL Final     Alkaline Phosphatase   Date Value Ref Range Status   02/18/2025 103 40 - 150 U/L Final     ALT   Date Value Ref Range Status   02/18/2025 66 0 - 70 U/L Final     AST   Date Value Ref Range Status   02/18/2025 66 (H) 0 - 45 U/L Final       Lab Results   Component Value Date    WBC 8.5 08/18/2021     Lab Results   Component Value Date    RBC 3.26 08/18/2021     Lab Results   Component Value Date    HGB 9.7 08/18/2021     Lab Results   Component Value Date    HCT 29.0 08/18/2021     Lab Results   Component Value Date    MCV 89 08/18/2021     Lab Results   Component Value Date    MCH 29.8 08/18/2021     Lab Results   Component Value Date    MCHC 33.4 08/18/2021     Lab Results   Component  Value Date    RDW 14.2 08/18/2021     Lab Results   Component Value Date     08/18/2021       INR   Date Value Ref Range Status   02/18/2025 1.11 0.85 - 1.15 Final     Last Comprehensive Metabolic Panel:  Sodium   Date Value Ref Range Status   02/18/2025 144 135 - 145 mmol/L Final     Potassium   Date Value Ref Range Status   02/18/2025 4.4 3.4 - 5.3 mmol/L Final   06/01/2022 4.0 3.5 - 5.0 mmol/L Final     Chloride   Date Value Ref Range Status   02/18/2025 113 (H) 98 - 107 mmol/L Final   06/01/2022 104 98 - 107 mmol/L Final     Carbon Dioxide (CO2)   Date Value Ref Range Status   02/18/2025 25 22 - 29 mmol/L Final   06/01/2022 24 22 - 31 mmol/L Final     Anion Gap   Date Value Ref Range Status   02/18/2025 6 (L) 7 - 15 mmol/L Final   06/01/2022 11 5 - 18 mmol/L Final     Glucose   Date Value Ref Range Status   02/18/2025 94 70 - 99 mg/dL Final   06/01/2022 80 70 - 125 mg/dL Final     GLUCOSE BY METER POCT   Date Value Ref Range Status   09/14/2022 97 70 - 99 mg/dL Final     Urea Nitrogen   Date Value Ref Range Status   02/18/2025 14.9 6.0 - 20.0 mg/dL Final   06/01/2022 12 8 - 22 mg/dL Final     Creatinine   Date Value Ref Range Status   02/18/2025 0.79 0.67 - 1.17 mg/dL Final     GFR Estimate   Date Value Ref Range Status   02/18/2025 >90 >60 mL/min/1.73m2 Final     Comment:     eGFR calculated using 2021 CKD-EPI equation.     Calcium   Date Value Ref Range Status   02/18/2025 9.9 8.8 - 10.4 mg/dL Final     Bilirubin Total   Date Value Ref Range Status   02/18/2025 1.1 <=1.2 mg/dL Final     Alkaline Phosphatase   Date Value Ref Range Status   02/18/2025 103 40 - 150 U/L Final     ALT   Date Value Ref Range Status   02/18/2025 66 0 - 70 U/L Final     AST   Date Value Ref Range Status   02/18/2025 66 (H) 0 - 45 U/L Final             Lab Results   Component Value Date    WBC 8.3 09/10/2021     Lab Results   Component Value Date    RBC 2.64 09/10/2021     Lab Results   Component Value Date    HGB 7.9 09/10/2021      Lab Results   Component Value Date    HCT 24.3 09/10/2021     No components found for: MCT  Lab Results   Component Value Date    MCV 92 09/10/2021     Lab Results   Component Value Date    MCH 29.9 09/10/2021     Lab Results   Component Value Date    MCHC 32.5 09/10/2021     Lab Results   Component Value Date    RDW 14.4 09/10/2021     Lab Results   Component Value Date     09/10/2021     MELD 3.0: 7 at 2/18/2025 11:46 AM  MELD-Na: 8 at 2/18/2025 11:46 AM  Calculated from:  Serum Creatinine: 0.79 mg/dL (Using min of 1 mg/dL) at 2/18/2025 11:46 AM  Serum Sodium: 144 mmol/L (Using max of 137 mmol/L) at 2/18/2025 11:46 AM  Total Bilirubin: 1.1 mg/dL at 2/18/2025 11:46 AM  Serum Albumin: 3.9 g/dL (Using max of 3.5 g/dL) at 2/18/2025 11:46 AM  INR(ratio): 1.11 at 2/18/2025 11:46 AM  Age at listing (hypothetical): 44 years  Sex: Male at 2/18/2025 11:46 AM      Imaging: Reviewed in EHR    Endoscopy: Reviewed in EHR    Independently reviewed labs and imaging.      Assessment/Plan: Mr. Nowak is a 44 year old man with a history of AUD, alcohol related cirrhosis c/b refractory ascites/SBP, recurrent variceal bleeding, now s/p TIPS 9/2021    Ascites improved post TIPS, but had issues with umbilical hernia after his TIPS and on 11/11 was unable to reduce and required surgical repair. Underwent recurrent umbilical hernia repair 9/2022, doing well after this.  Has some elevated velocities in his TIPS ultrasound but overall feel it is working well    He has not had ascites on imaging.  Having some lower extremity edema.    - Lasix 20 mg daily as needed for lower extremity edema  -Continue low sodium diet  -Discussed complete sobriety from alcohol, including NA beers  - HCC screening with AFP and RUQ US q6months, TIPS US every 6 months  -Ordered surveillance colonoscopy given history of polyps    Orders Placed This Encounter   Procedures     US Abdomen Limited     AFP tumor marker     BASIC METABOLIC PANEL     CBC  with platelets     Hepatic function panel     INR     Colonoscopy Screening  Referral     RTC 6 months with KESHIA Murray MD MS  Hepatology/Liver Transplant  [facility]    The longitudinal plan of care for the diagnosis(es)/condition(s) as documented were addressed during this visit. Due to the added complexity in care, I will continue to support Christiano in the subsequent management and with ongoing continuity of care.      Again, thank you for allowing me to participate in the care of your patient.        Sincerely,        Cadence Murray MD    Electronically signed

## 2025-05-07 ENCOUNTER — MYC REFILL (OUTPATIENT)
Dept: GASTROENTEROLOGY | Facility: CLINIC | Age: 45
End: 2025-05-07
Payer: COMMERCIAL

## 2025-05-07 DIAGNOSIS — R60.0 LEG EDEMA: ICD-10-CM

## 2025-05-07 RX ORDER — FUROSEMIDE 20 MG/1
20 TABLET ORAL DAILY
Qty: 90 TABLET | Refills: 3 | Status: SHIPPED | OUTPATIENT
Start: 2025-05-07

## 2025-08-13 ENCOUNTER — TELEPHONE (OUTPATIENT)
Dept: GASTROENTEROLOGY | Facility: CLINIC | Age: 45
End: 2025-08-13
Payer: COMMERCIAL

## (undated) DEVICE — SU VICRYL 3-0 SH CR 8X18" J774

## (undated) DEVICE — BLADE CLIPPER SGL USE 9680

## (undated) DEVICE — CLIP HEMOCLIP ENDOSCOPIC INSTINCT 2.8X230CM INSC-7-230-SS

## (undated) DEVICE — SU SILK 4-0 TIE 12X30" A303H

## (undated) DEVICE — SYR BULB IRRIG DOVER 60 ML LATEX FREE 67000

## (undated) DEVICE — SU VICRYL 3-0 SH 27" UND J416H

## (undated) DEVICE — DRAPE IOBAN INCISE 23X17" 6650EZ

## (undated) DEVICE — PREP CHLORAPREP 26ML TINTED ORANGE  260815

## (undated) DEVICE — BNDG ABDOMINAL BINDER 9X45-62" 79-89071

## (undated) DEVICE — DEVICE SUTURE PASSER 14GA WECK EFX EFXSP2

## (undated) DEVICE — GOWN IMPERVIOUS 2XL BLUE

## (undated) DEVICE — SU MONOCRYL 4-0 PS-2 27" UND Y426H

## (undated) DEVICE — ADH SKIN CLOSURE PREMIERPRO EXOFIN 1.0ML 3470

## (undated) DEVICE — DRSG STERI STRIP 1/2X4" R1547

## (undated) DEVICE — SUCTION MANIFOLD NEPTUNE 2 SYS 4 PORT 0702-020-000

## (undated) DEVICE — NDL 25GA 2"  8881200441

## (undated) DEVICE — SU PDS II 0 CT-1 27" Z340H

## (undated) DEVICE — SOL ADH LIQUID BENZOIN SWAB 0.6ML C1544

## (undated) DEVICE — SU ETHILON 3-0 PS-1 18" 1663H

## (undated) DEVICE — TUBING SUCTION 12"X1/4" N612

## (undated) DEVICE — SU SILK 3-0 TIE 12X30" A304H

## (undated) DEVICE — DRAPE SHEET REV FOLD 3/4 9349

## (undated) DEVICE — SOL NACL 0.9% IRRIG 1000ML BOTTLE 2F7124

## (undated) DEVICE — DRSG PRIMAPORE 03 1/8X6" 66000318

## (undated) DEVICE — DRAPE LAP W/ARMBOARD 29410

## (undated) DEVICE — DRAIN JACKSON PRATT RESERVOIR 100ML SU130-1305

## (undated) DEVICE — Device

## (undated) DEVICE — KIT ENDO TURNOVER/PROCEDURE CARRY-ON 101822

## (undated) DEVICE — SOL WATER IRRIG 500ML BOTTLE 2F7113

## (undated) DEVICE — SU PROLENE 1 CTX 30" 8455H

## (undated) DEVICE — SU SILK 2-0 TIE 12X30" A305H

## (undated) DEVICE — SYR BULB IRRIG 50ML LATEX FREE 0035280

## (undated) DEVICE — SPECIMEN CONTAINER 3OZ W/FORMALIN 59901

## (undated) DEVICE — ENDO SNARE EXACTO COLD 9MM LOOP 2.4MMX230CM 00711115

## (undated) DEVICE — TAPE MICROPORE 2"X1.5YD 1530S-2

## (undated) DEVICE — SYR 30ML SLIP TIP W/O NDL 302833

## (undated) DEVICE — ESU GROUND PAD ADULT W/CORD E7507

## (undated) DEVICE — BLADE KNIFE SURG 15 371115

## (undated) DEVICE — PREP CHLORAPREP 26ML TINTED HI-LITE ORANGE 930815

## (undated) DEVICE — GLOVE EXAM NITRILE LG PF LATEX FREE 5064

## (undated) DEVICE — SUCTION CATH AIRLIFE TRI-FLO W/CONTROL PORT 14FR  T60C

## (undated) DEVICE — SU PROLENE 0 CT-1 30" 8424H

## (undated) DEVICE — ESU PENCIL SMOKE EVAC W/ROCKER SWITCH 0703-047-000

## (undated) DEVICE — PAD CHUX UNDERPAD 23X24" 7136

## (undated) DEVICE — ENDO BITE BLOCK ADULT OMNI-BLOC

## (undated) DEVICE — DRAIN JACKSON PRATT ROUND W/TROCAR 15FR LF JP-HUR151

## (undated) DEVICE — ENDO FORCEP SPIKED SERRATED SHAFT JUMBO 239CM G56998

## (undated) DEVICE — LINEN TOWEL PACK X5 5464

## (undated) DEVICE — DECANTER VIAL 2006S

## (undated) DEVICE — DRAPE SHEET MED 44X70" 9355

## (undated) DEVICE — DRSG MEDIPORE 3 1/2X13 3/4" 3573

## (undated) DEVICE — ENDO TRAP POLYP E-TRAP 00711099

## (undated) DEVICE — SOL WATER IRRIG 1000ML BOTTLE 2F7114

## (undated) DEVICE — SUCTION MANIFOLD NEPTUNE 2 SYS 1 PORT 702-025-000

## (undated) DEVICE — SU MONOCRYL 4-0 PS-2 18" UND Y496G

## (undated) DEVICE — DRSG PRIMAPORE 02X3" 7133

## (undated) DEVICE — SU SILK 0 TIE 6X30" A306H

## (undated) RX ORDER — IBUPROFEN 200 MG
TABLET ORAL
Status: DISPENSED
Start: 2022-09-14

## (undated) RX ORDER — HYDROMORPHONE HCL IN WATER/PF 6 MG/30 ML
PATIENT CONTROLLED ANALGESIA SYRINGE INTRAVENOUS
Status: DISPENSED
Start: 2022-09-14

## (undated) RX ORDER — ACETAMINOPHEN 325 MG/1
TABLET ORAL
Status: DISPENSED
Start: 2022-09-14

## (undated) RX ORDER — PIPERACILLIN SODIUM, TAZOBACTAM SODIUM 3; .375 G/15ML; G/15ML
INJECTION, POWDER, LYOPHILIZED, FOR SOLUTION INTRAVENOUS
Status: DISPENSED
Start: 2022-09-14

## (undated) RX ORDER — KETOROLAC TROMETHAMINE 30 MG/ML
INJECTION, SOLUTION INTRAMUSCULAR; INTRAVENOUS
Status: DISPENSED
Start: 2022-09-14

## (undated) RX ORDER — PROPOFOL 10 MG/ML
INJECTION, EMULSION INTRAVENOUS
Status: DISPENSED
Start: 2022-09-14

## (undated) RX ORDER — FENTANYL CITRATE 50 UG/ML
INJECTION, SOLUTION INTRAMUSCULAR; INTRAVENOUS
Status: DISPENSED
Start: 2021-11-11

## (undated) RX ORDER — HYDROMORPHONE HCL IN WATER/PF 6 MG/30 ML
PATIENT CONTROLLED ANALGESIA SYRINGE INTRAVENOUS
Status: DISPENSED
Start: 2021-11-11

## (undated) RX ORDER — ALBUMIN (HUMAN) 12.5 G/50ML
SOLUTION INTRAVENOUS
Status: DISPENSED
Start: 2021-09-02

## (undated) RX ORDER — FENTANYL CITRATE-0.9 % NACL/PF 10 MCG/ML
PLASTIC BAG, INJECTION (ML) INTRAVENOUS
Status: DISPENSED
Start: 2022-09-14

## (undated) RX ORDER — ONDANSETRON 2 MG/ML
INJECTION INTRAMUSCULAR; INTRAVENOUS
Status: DISPENSED
Start: 2022-09-14

## (undated) RX ORDER — GLYCOPYRROLATE 0.2 MG/ML
INJECTION, SOLUTION INTRAMUSCULAR; INTRAVENOUS
Status: DISPENSED
Start: 2022-09-14

## (undated) RX ORDER — EPHEDRINE SULFATE 50 MG/ML
INJECTION, SOLUTION INTRAMUSCULAR; INTRAVENOUS; SUBCUTANEOUS
Status: DISPENSED
Start: 2021-11-11

## (undated) RX ORDER — FUROSEMIDE 10 MG/ML
INJECTION INTRAMUSCULAR; INTRAVENOUS
Status: DISPENSED
Start: 2022-09-14

## (undated) RX ORDER — FENTANYL CITRATE 50 UG/ML
INJECTION, SOLUTION INTRAMUSCULAR; INTRAVENOUS
Status: DISPENSED
Start: 2022-09-14

## (undated) RX ORDER — KETOROLAC TROMETHAMINE 30 MG/ML
INJECTION, SOLUTION INTRAMUSCULAR; INTRAVENOUS
Status: DISPENSED
Start: 2021-11-11

## (undated) RX ORDER — FENTANYL CITRATE-0.9 % NACL/PF 10 MCG/ML
PLASTIC BAG, INJECTION (ML) INTRAVENOUS
Status: DISPENSED
Start: 2021-11-11

## (undated) RX ORDER — LIDOCAINE HYDROCHLORIDE 20 MG/ML
INJECTION, SOLUTION EPIDURAL; INFILTRATION; INTRACAUDAL; PERINEURAL
Status: DISPENSED
Start: 2021-11-11

## (undated) RX ORDER — ONDANSETRON 2 MG/ML
INJECTION INTRAMUSCULAR; INTRAVENOUS
Status: DISPENSED
Start: 2021-11-11

## (undated) RX ORDER — OXYCODONE HYDROCHLORIDE 10 MG/1
TABLET ORAL
Status: DISPENSED
Start: 2021-11-11

## (undated) RX ORDER — DEXAMETHASONE SODIUM PHOSPHATE 4 MG/ML
INJECTION, SOLUTION INTRA-ARTICULAR; INTRALESIONAL; INTRAMUSCULAR; INTRAVENOUS; SOFT TISSUE
Status: DISPENSED
Start: 2021-11-11

## (undated) RX ORDER — PROPOFOL 10 MG/ML
INJECTION, EMULSION INTRAVENOUS
Status: DISPENSED
Start: 2021-11-11

## (undated) RX ORDER — LIDOCAINE HYDROCHLORIDE 20 MG/ML
INJECTION, SOLUTION EPIDURAL; INFILTRATION; INTRACAUDAL; PERINEURAL
Status: DISPENSED
Start: 2022-09-14

## (undated) RX ORDER — FUROSEMIDE 10 MG/ML
INJECTION INTRAMUSCULAR; INTRAVENOUS
Status: DISPENSED
Start: 2021-11-11

## (undated) RX ORDER — EPHEDRINE SULFATE 50 MG/ML
INJECTION, SOLUTION INTRAMUSCULAR; INTRAVENOUS; SUBCUTANEOUS
Status: DISPENSED
Start: 2022-09-14

## (undated) RX ORDER — OXYCODONE HYDROCHLORIDE 5 MG/1
TABLET ORAL
Status: DISPENSED
Start: 2022-09-14

## (undated) RX ORDER — ROCURONIUM BROMIDE 50 MG/5 ML
SYRINGE (ML) INTRAVENOUS
Status: DISPENSED
Start: 2021-11-11

## (undated) RX ORDER — SODIUM CHLORIDE, SODIUM LACTATE, POTASSIUM CHLORIDE, CALCIUM CHLORIDE 600; 310; 30; 20 MG/100ML; MG/100ML; MG/100ML; MG/100ML
INJECTION, SOLUTION INTRAVENOUS
Status: DISPENSED
Start: 2021-11-11